# Patient Record
Sex: MALE | Race: WHITE | Employment: FULL TIME | URBAN - METROPOLITAN AREA
[De-identification: names, ages, dates, MRNs, and addresses within clinical notes are randomized per-mention and may not be internally consistent; named-entity substitution may affect disease eponyms.]

---

## 2019-11-08 ENCOUNTER — APPOINTMENT (OUTPATIENT)
Dept: GENERAL RADIOLOGY | Age: 49
End: 2019-11-08
Payer: COMMERCIAL

## 2019-11-08 ENCOUNTER — HOSPITAL ENCOUNTER (EMERGENCY)
Age: 49
Discharge: HOME | End: 2019-11-08
Attending: EMERGENCY MEDICINE
Payer: COMMERCIAL

## 2019-11-08 VITALS
TEMPERATURE: 97.2 F | SYSTOLIC BLOOD PRESSURE: 153 MMHG | DIASTOLIC BLOOD PRESSURE: 96 MMHG | HEART RATE: 79 BPM | WEIGHT: 180 LBS | RESPIRATION RATE: 18 BRPM | OXYGEN SATURATION: 100 %

## 2019-11-08 DIAGNOSIS — S61.019A: Primary | ICD-10-CM

## 2019-11-08 PROCEDURE — 96374 THER/PROPH/DIAG INJ IV PUSH: CPT

## 2019-11-08 PROCEDURE — 96372 THER/PROPH/DIAG INJ SC/IM: CPT

## 2019-11-08 PROCEDURE — 6360000002 HC RX W HCPCS

## 2019-11-08 PROCEDURE — 73130 X-RAY EXAM OF HAND: CPT

## 2019-11-08 PROCEDURE — 99283 EMERGENCY DEPT VISIT LOW MDM: CPT

## 2019-11-08 PROCEDURE — 12001 RPR S/N/AX/GEN/TRNK 2.5CM/<: CPT

## 2019-11-08 PROCEDURE — 2500000003 HC RX 250 WO HCPCS: Performed by: EMERGENCY MEDICINE

## 2019-11-08 PROCEDURE — 6370000000 HC RX 637 (ALT 250 FOR IP): Performed by: EMERGENCY MEDICINE

## 2019-11-08 RX ORDER — DOCUSATE SODIUM 100 MG/1
100 CAPSULE, LIQUID FILLED ORAL 2 TIMES DAILY
Qty: 6 CAPSULE | Refills: 0 | Status: SHIPPED | OUTPATIENT
Start: 2019-11-08 | End: 2019-11-11

## 2019-11-08 RX ORDER — LIDOCAINE HYDROCHLORIDE 10 MG/ML
20 INJECTION, SOLUTION INFILTRATION; PERINEURAL ONCE
Status: COMPLETED | OUTPATIENT
Start: 2019-11-08 | End: 2019-11-08

## 2019-11-08 RX ORDER — OXYCODONE HYDROCHLORIDE 5 MG/1
5 TABLET ORAL EVERY 6 HOURS PRN
Qty: 8 TABLET | Refills: 0 | Status: SHIPPED | OUTPATIENT
Start: 2019-11-08 | End: 2019-11-11

## 2019-11-08 RX ORDER — FENTANYL CITRATE 50 UG/ML
100 INJECTION, SOLUTION INTRAMUSCULAR; INTRAVENOUS ONCE
Status: COMPLETED | OUTPATIENT
Start: 2019-11-08 | End: 2019-11-08

## 2019-11-08 RX ORDER — CEPHALEXIN 500 MG/1
500 CAPSULE ORAL ONCE
Status: COMPLETED | OUTPATIENT
Start: 2019-11-08 | End: 2019-11-08

## 2019-11-08 RX ORDER — ACETAMINOPHEN 500 MG
1000 TABLET ORAL EVERY 8 HOURS PRN
Qty: 30 TABLET | Refills: 0 | Status: SHIPPED | OUTPATIENT
Start: 2019-11-08 | End: 2019-12-10 | Stop reason: ALTCHOICE

## 2019-11-08 RX ORDER — IBUPROFEN 800 MG/1
800 TABLET ORAL EVERY 8 HOURS PRN
Qty: 30 TABLET | Refills: 0 | Status: SHIPPED | OUTPATIENT
Start: 2019-11-08 | End: 2019-12-17 | Stop reason: ALTCHOICE

## 2019-11-08 RX ORDER — IBUPROFEN 800 MG/1
800 TABLET ORAL ONCE
Status: COMPLETED | OUTPATIENT
Start: 2019-11-08 | End: 2019-11-08

## 2019-11-08 RX ORDER — FENTANYL CITRATE 50 UG/ML
INJECTION, SOLUTION INTRAMUSCULAR; INTRAVENOUS
Status: COMPLETED
Start: 2019-11-08 | End: 2019-11-08

## 2019-11-08 RX ORDER — CEPHALEXIN 500 MG/1
500 CAPSULE ORAL 4 TIMES DAILY
Qty: 28 CAPSULE | Refills: 0 | Status: SHIPPED | OUTPATIENT
Start: 2019-11-08 | End: 2019-11-15

## 2019-11-08 RX ADMIN — LIDOCAINE HYDROCHLORIDE 20 ML: 10 INJECTION, SOLUTION INFILTRATION; PERINEURAL at 09:44

## 2019-11-08 RX ADMIN — FENTANYL CITRATE 100 MCG: 50 INJECTION, SOLUTION INTRAMUSCULAR; INTRAVENOUS at 09:44

## 2019-11-08 RX ADMIN — CEPHALEXIN 500 MG: 500 CAPSULE ORAL at 11:49

## 2019-11-08 RX ADMIN — IBUPROFEN 800 MG: 800 TABLET, FILM COATED ORAL at 11:49

## 2019-11-08 ASSESSMENT — PAIN SCALES - GENERAL
PAINLEVEL_OUTOF10: 4
PAINLEVEL_OUTOF10: 10
PAINLEVEL_OUTOF10: 10

## 2019-11-08 ASSESSMENT — PAIN DESCRIPTION - ORIENTATION: ORIENTATION: RIGHT

## 2019-11-08 ASSESSMENT — PAIN DESCRIPTION - PAIN TYPE: TYPE: ACUTE PAIN

## 2019-11-08 ASSESSMENT — PAIN DESCRIPTION - DESCRIPTORS: DESCRIPTORS: ACHING

## 2019-11-11 ENCOUNTER — TELEPHONE (OUTPATIENT)
Dept: BURN CARE | Age: 49
End: 2019-11-11

## 2019-11-12 ENCOUNTER — OFFICE VISIT (OUTPATIENT)
Dept: BURN CARE | Age: 49
End: 2019-11-12
Payer: COMMERCIAL

## 2019-11-12 VITALS
DIASTOLIC BLOOD PRESSURE: 70 MMHG | HEIGHT: 72 IN | HEART RATE: 72 BPM | SYSTOLIC BLOOD PRESSURE: 117 MMHG | BODY MASS INDEX: 25.06 KG/M2 | WEIGHT: 185 LBS

## 2019-11-12 DIAGNOSIS — S66.222A LACERATION OF EXTENSOR MUSCLE, FASCIA AND TENDON OF LEFT THUMB AT WRIST AND HAND LEVEL, INITIAL ENCOUNTER: Primary | ICD-10-CM

## 2019-11-12 PROCEDURE — 99203 OFFICE O/P NEW LOW 30 MIN: CPT | Performed by: PLASTIC SURGERY

## 2019-11-12 PROCEDURE — 99202 OFFICE O/P NEW SF 15 MIN: CPT

## 2019-11-12 SDOH — HEALTH STABILITY: MENTAL HEALTH: HOW OFTEN DO YOU HAVE A DRINK CONTAINING ALCOHOL?: NEVER

## 2019-11-13 ENCOUNTER — ANESTHESIA (OUTPATIENT)
Dept: OPERATING ROOM | Age: 49
End: 2019-11-13
Payer: COMMERCIAL

## 2019-11-13 ENCOUNTER — ANESTHESIA EVENT (OUTPATIENT)
Dept: OPERATING ROOM | Age: 49
End: 2019-11-13
Payer: COMMERCIAL

## 2019-11-13 ENCOUNTER — HOSPITAL ENCOUNTER (OUTPATIENT)
Age: 49
Setting detail: OUTPATIENT SURGERY
Discharge: HOME | End: 2019-11-13
Attending: PLASTIC SURGERY | Admitting: PLASTIC SURGERY
Payer: COMMERCIAL

## 2019-11-13 VITALS
WEIGHT: 185 LBS | DIASTOLIC BLOOD PRESSURE: 77 MMHG | OXYGEN SATURATION: 92 % | SYSTOLIC BLOOD PRESSURE: 126 MMHG | HEIGHT: 72 IN | HEART RATE: 68 BPM | TEMPERATURE: 97.4 F | BODY MASS INDEX: 25.06 KG/M2 | RESPIRATION RATE: 14 BRPM

## 2019-11-13 VITALS — DIASTOLIC BLOOD PRESSURE: 39 MMHG | SYSTOLIC BLOOD PRESSURE: 76 MMHG | TEMPERATURE: 95.7 F | OXYGEN SATURATION: 98 %

## 2019-11-13 DIAGNOSIS — S66.022A LACERATION OF LONG FLEXOR MUSCLE, FASCIA AND TENDON OF LEFT THUMB AT WRIST AND HAND LEVEL, INITIAL ENCOUNTER: Primary | ICD-10-CM

## 2019-11-13 DIAGNOSIS — S66.222A LACERATION OF EXTENSOR MUSCLE, FASCIA AND TENDON OF LEFT THUMB AT WRIST AND HAND LEVEL, INITIAL ENCOUNTER: ICD-10-CM

## 2019-11-13 PROCEDURE — 6360000002 HC RX W HCPCS: Performed by: NURSE ANESTHETIST, CERTIFIED REGISTERED

## 2019-11-13 PROCEDURE — 2500000003 HC RX 250 WO HCPCS: Performed by: NURSE ANESTHETIST, CERTIFIED REGISTERED

## 2019-11-13 PROCEDURE — 3600000013 HC SURGERY LEVEL 3 ADDTL 15MIN: Performed by: PLASTIC SURGERY

## 2019-11-13 PROCEDURE — 2580000003 HC RX 258: Performed by: NURSE ANESTHETIST, CERTIFIED REGISTERED

## 2019-11-13 PROCEDURE — 6360000002 HC RX W HCPCS: Performed by: PLASTIC SURGERY

## 2019-11-13 PROCEDURE — 7100000010 HC PHASE II RECOVERY - FIRST 15 MIN: Performed by: PLASTIC SURGERY

## 2019-11-13 PROCEDURE — 3700000001 HC ADD 15 MINUTES (ANESTHESIA): Performed by: PLASTIC SURGERY

## 2019-11-13 PROCEDURE — 7100000011 HC PHASE II RECOVERY - ADDTL 15 MIN: Performed by: PLASTIC SURGERY

## 2019-11-13 PROCEDURE — 2500000003 HC RX 250 WO HCPCS: Performed by: PLASTIC SURGERY

## 2019-11-13 PROCEDURE — 2709999900 HC NON-CHARGEABLE SUPPLY: Performed by: PLASTIC SURGERY

## 2019-11-13 PROCEDURE — 7100000001 HC PACU RECOVERY - ADDTL 15 MIN: Performed by: PLASTIC SURGERY

## 2019-11-13 PROCEDURE — 3600000003 HC SURGERY LEVEL 3 BASE: Performed by: PLASTIC SURGERY

## 2019-11-13 PROCEDURE — 6370000000 HC RX 637 (ALT 250 FOR IP): Performed by: PLASTIC SURGERY

## 2019-11-13 PROCEDURE — 7100000000 HC PACU RECOVERY - FIRST 15 MIN: Performed by: PLASTIC SURGERY

## 2019-11-13 PROCEDURE — 3700000000 HC ANESTHESIA ATTENDED CARE: Performed by: PLASTIC SURGERY

## 2019-11-13 RX ORDER — FENTANYL CITRATE 50 UG/ML
25 INJECTION, SOLUTION INTRAMUSCULAR; INTRAVENOUS EVERY 5 MIN PRN
Status: DISCONTINUED | OUTPATIENT
Start: 2019-11-13 | End: 2019-11-13 | Stop reason: HOSPADM

## 2019-11-13 RX ORDER — DEXAMETHASONE SODIUM PHOSPHATE 10 MG/ML
INJECTION INTRAMUSCULAR; INTRAVENOUS PRN
Status: DISCONTINUED | OUTPATIENT
Start: 2019-11-13 | End: 2019-11-13 | Stop reason: SDUPTHER

## 2019-11-13 RX ORDER — ONDANSETRON 2 MG/ML
INJECTION INTRAMUSCULAR; INTRAVENOUS PRN
Status: DISCONTINUED | OUTPATIENT
Start: 2019-11-13 | End: 2019-11-13 | Stop reason: SDUPTHER

## 2019-11-13 RX ORDER — PROPOFOL 10 MG/ML
INJECTION, EMULSION INTRAVENOUS PRN
Status: DISCONTINUED | OUTPATIENT
Start: 2019-11-13 | End: 2019-11-13 | Stop reason: SDUPTHER

## 2019-11-13 RX ORDER — MIDAZOLAM HYDROCHLORIDE 1 MG/ML
INJECTION INTRAMUSCULAR; INTRAVENOUS PRN
Status: DISCONTINUED | OUTPATIENT
Start: 2019-11-13 | End: 2019-11-13 | Stop reason: SDUPTHER

## 2019-11-13 RX ORDER — BUPIVACAINE HYDROCHLORIDE AND EPINEPHRINE 5; 5 MG/ML; UG/ML
INJECTION, SOLUTION EPIDURAL; INTRACAUDAL; PERINEURAL
Status: DISCONTINUED
Start: 2019-11-13 | End: 2019-11-13 | Stop reason: HOSPADM

## 2019-11-13 RX ORDER — BUPIVACAINE HYDROCHLORIDE AND EPINEPHRINE 5; 5 MG/ML; UG/ML
INJECTION, SOLUTION EPIDURAL; INTRACAUDAL; PERINEURAL PRN
Status: DISCONTINUED | OUTPATIENT
Start: 2019-11-13 | End: 2019-11-13 | Stop reason: ALTCHOICE

## 2019-11-13 RX ORDER — OXYCODONE HYDROCHLORIDE AND ACETAMINOPHEN 5; 325 MG/1; MG/1
1 TABLET ORAL EVERY 6 HOURS PRN
Qty: 28 TABLET | Refills: 0 | Status: SHIPPED | OUTPATIENT
Start: 2019-11-13 | End: 2019-11-20

## 2019-11-13 RX ORDER — GINSENG 100 MG
CAPSULE ORAL
Status: DISCONTINUED
Start: 2019-11-13 | End: 2019-11-13 | Stop reason: HOSPADM

## 2019-11-13 RX ORDER — FENTANYL CITRATE 50 UG/ML
50 INJECTION, SOLUTION INTRAMUSCULAR; INTRAVENOUS EVERY 5 MIN PRN
Status: DISCONTINUED | OUTPATIENT
Start: 2019-11-13 | End: 2019-11-13 | Stop reason: HOSPADM

## 2019-11-13 RX ORDER — FENTANYL CITRATE 50 UG/ML
INJECTION, SOLUTION INTRAMUSCULAR; INTRAVENOUS PRN
Status: DISCONTINUED | OUTPATIENT
Start: 2019-11-13 | End: 2019-11-13 | Stop reason: SDUPTHER

## 2019-11-13 RX ORDER — LIDOCAINE HYDROCHLORIDE 20 MG/ML
INJECTION, SOLUTION EPIDURAL; INFILTRATION; INTRACAUDAL; PERINEURAL PRN
Status: DISCONTINUED | OUTPATIENT
Start: 2019-11-13 | End: 2019-11-13 | Stop reason: SDUPTHER

## 2019-11-13 RX ORDER — ONDANSETRON 2 MG/ML
4 INJECTION INTRAMUSCULAR; INTRAVENOUS
Status: DISCONTINUED | OUTPATIENT
Start: 2019-11-13 | End: 2019-11-13 | Stop reason: HOSPADM

## 2019-11-13 RX ORDER — LAMOTRIGINE 100 MG/1
200 TABLET ORAL DAILY
COMMUNITY

## 2019-11-13 RX ORDER — SODIUM CHLORIDE, SODIUM LACTATE, POTASSIUM CHLORIDE, CALCIUM CHLORIDE 600; 310; 30; 20 MG/100ML; MG/100ML; MG/100ML; MG/100ML
INJECTION, SOLUTION INTRAVENOUS CONTINUOUS PRN
Status: DISCONTINUED | OUTPATIENT
Start: 2019-11-13 | End: 2019-11-13 | Stop reason: SDUPTHER

## 2019-11-13 RX ORDER — PHENYTOIN SODIUM 100 MG/1
100 CAPSULE, EXTENDED RELEASE ORAL 2 TIMES DAILY
COMMUNITY

## 2019-11-13 RX ORDER — GINSENG 100 MG
CAPSULE ORAL PRN
Status: DISCONTINUED | OUTPATIENT
Start: 2019-11-13 | End: 2019-11-13 | Stop reason: ALTCHOICE

## 2019-11-13 RX ORDER — METOCLOPRAMIDE HYDROCHLORIDE 5 MG/ML
10 INJECTION INTRAMUSCULAR; INTRAVENOUS
Status: DISCONTINUED | OUTPATIENT
Start: 2019-11-13 | End: 2019-11-13 | Stop reason: HOSPADM

## 2019-11-13 RX ORDER — HYDRALAZINE HYDROCHLORIDE 20 MG/ML
5 INJECTION INTRAMUSCULAR; INTRAVENOUS EVERY 10 MIN PRN
Status: DISCONTINUED | OUTPATIENT
Start: 2019-11-13 | End: 2019-11-13 | Stop reason: HOSPADM

## 2019-11-13 RX ADMIN — FENTANYL CITRATE 25 MCG: 50 INJECTION INTRAMUSCULAR; INTRAVENOUS at 10:55

## 2019-11-13 RX ADMIN — PROPOFOL 200 MG: 10 INJECTION, EMULSION INTRAVENOUS at 10:27

## 2019-11-13 RX ADMIN — FENTANYL CITRATE 25 MCG: 50 INJECTION INTRAMUSCULAR; INTRAVENOUS at 10:31

## 2019-11-13 RX ADMIN — FENTANYL CITRATE 50 MCG: 50 INJECTION INTRAMUSCULAR; INTRAVENOUS at 10:27

## 2019-11-13 RX ADMIN — FENTANYL CITRATE 50 MCG: 50 INJECTION INTRAMUSCULAR; INTRAVENOUS at 11:04

## 2019-11-13 RX ADMIN — DEXAMETHASONE SODIUM PHOSPHATE 8 MG: 10 INJECTION INTRAMUSCULAR; INTRAVENOUS at 11:09

## 2019-11-13 RX ADMIN — ONDANSETRON 4 MG: 2 INJECTION INTRAMUSCULAR; INTRAVENOUS at 11:06

## 2019-11-13 RX ADMIN — SODIUM CHLORIDE, POTASSIUM CHLORIDE, SODIUM LACTATE AND CALCIUM CHLORIDE: 600; 310; 30; 20 INJECTION, SOLUTION INTRAVENOUS at 10:24

## 2019-11-13 RX ADMIN — FENTANYL CITRATE 25 MCG: 50 INJECTION INTRAMUSCULAR; INTRAVENOUS at 10:38

## 2019-11-13 RX ADMIN — Medication 2 G: at 10:33

## 2019-11-13 RX ADMIN — LIDOCAINE HYDROCHLORIDE 50 MG: 20 INJECTION, SOLUTION EPIDURAL; INFILTRATION; INTRACAUDAL; PERINEURAL at 10:27

## 2019-11-13 RX ADMIN — MIDAZOLAM HYDROCHLORIDE 2 MG: 1 INJECTION, SOLUTION INTRAMUSCULAR; INTRAVENOUS at 10:25

## 2019-11-13 RX ADMIN — FENTANYL CITRATE 25 MCG: 50 INJECTION INTRAMUSCULAR; INTRAVENOUS at 10:46

## 2019-11-13 ASSESSMENT — PULMONARY FUNCTION TESTS
PIF_VALUE: 12
PIF_VALUE: 11
PIF_VALUE: 12
PIF_VALUE: 11
PIF_VALUE: 10
PIF_VALUE: 12
PIF_VALUE: 11
PIF_VALUE: 4
PIF_VALUE: 12
PIF_VALUE: 10
PIF_VALUE: 11
PIF_VALUE: 11
PIF_VALUE: 2
PIF_VALUE: 11
PIF_VALUE: 12
PIF_VALUE: 11
PIF_VALUE: 10
PIF_VALUE: 11
PIF_VALUE: 11
PIF_VALUE: 12
PIF_VALUE: 11
PIF_VALUE: 11
PIF_VALUE: 12
PIF_VALUE: 10
PIF_VALUE: 10
PIF_VALUE: 11
PIF_VALUE: 2
PIF_VALUE: 11
PIF_VALUE: 12
PIF_VALUE: 11
PIF_VALUE: 11
PIF_VALUE: 12
PIF_VALUE: 11
PIF_VALUE: 13
PIF_VALUE: 3
PIF_VALUE: 11
PIF_VALUE: 2
PIF_VALUE: 12
PIF_VALUE: 12
PIF_VALUE: 1
PIF_VALUE: 12
PIF_VALUE: 1
PIF_VALUE: 11
PIF_VALUE: 19
PIF_VALUE: 11
PIF_VALUE: 1
PIF_VALUE: 10
PIF_VALUE: 11
PIF_VALUE: 11
PIF_VALUE: 2

## 2019-11-13 ASSESSMENT — PAIN - FUNCTIONAL ASSESSMENT
PAIN_FUNCTIONAL_ASSESSMENT: PREVENTS OR INTERFERES SOME ACTIVE ACTIVITIES AND ADLS
PAIN_FUNCTIONAL_ASSESSMENT: 0-10

## 2019-11-13 ASSESSMENT — PAIN SCALES - GENERAL: PAINLEVEL_OUTOF10: 0

## 2019-11-13 ASSESSMENT — PAIN DESCRIPTION - DESCRIPTORS: DESCRIPTORS: ACHING;DULL

## 2019-11-14 ENCOUNTER — TELEPHONE (OUTPATIENT)
Dept: BURN CARE | Age: 49
End: 2019-11-14

## 2019-11-14 NOTE — TELEPHONE ENCOUNTER
Pt wife Philly Delatorre called the nurse line requesting to speak with  nurse. Joan Alva stated her  had surgery yesterday with  and she needed to speak with the nurse as soon as possible.  Joan Alva phone number is [phone]

## 2019-11-15 ENCOUNTER — TELEPHONE (OUTPATIENT)
Dept: BURN CARE | Age: 49
End: 2019-11-15

## 2019-11-26 ENCOUNTER — OFFICE VISIT (OUTPATIENT)
Dept: BURN CARE | Age: 49
End: 2019-11-26
Payer: COMMERCIAL

## 2019-11-26 VITALS
HEART RATE: 78 BPM | HEIGHT: 72 IN | BODY MASS INDEX: 25.33 KG/M2 | DIASTOLIC BLOOD PRESSURE: 71 MMHG | WEIGHT: 187 LBS | SYSTOLIC BLOOD PRESSURE: 107 MMHG

## 2019-11-26 DIAGNOSIS — S66.222A LACERATION OF EXTENSOR MUSCLE, FASCIA AND TENDON OF LEFT THUMB AT WRIST AND HAND LEVEL, INITIAL ENCOUNTER: ICD-10-CM

## 2019-11-26 PROCEDURE — 99212 OFFICE O/P EST SF 10 MIN: CPT | Performed by: PLASTIC SURGERY

## 2019-11-26 PROCEDURE — 99024 POSTOP FOLLOW-UP VISIT: CPT | Performed by: PLASTIC SURGERY

## 2019-12-10 ENCOUNTER — TELEPHONE (OUTPATIENT)
Dept: BURN CARE | Age: 49
End: 2019-12-10

## 2019-12-10 ENCOUNTER — OFFICE VISIT (OUTPATIENT)
Dept: BURN CARE | Age: 49
End: 2019-12-10
Payer: COMMERCIAL

## 2019-12-10 VITALS
HEIGHT: 72 IN | BODY MASS INDEX: 25.32 KG/M2 | SYSTOLIC BLOOD PRESSURE: 111 MMHG | HEART RATE: 73 BPM | WEIGHT: 186.95 LBS | DIASTOLIC BLOOD PRESSURE: 72 MMHG

## 2019-12-10 DIAGNOSIS — S66.222A LACERATION OF EXTENSOR MUSCLE, FASCIA AND TENDON OF LEFT THUMB AT WRIST AND HAND LEVEL, INITIAL ENCOUNTER: ICD-10-CM

## 2019-12-10 PROCEDURE — 99024 POSTOP FOLLOW-UP VISIT: CPT | Performed by: PLASTIC SURGERY

## 2019-12-10 PROCEDURE — 99212 OFFICE O/P EST SF 10 MIN: CPT

## 2019-12-10 PROCEDURE — 99212 OFFICE O/P EST SF 10 MIN: CPT | Performed by: PLASTIC SURGERY

## 2019-12-17 ENCOUNTER — ANESTHESIA EVENT (OUTPATIENT)
Dept: OPERATING ROOM | Age: 49
End: 2019-12-17
Payer: COMMERCIAL

## 2019-12-17 ENCOUNTER — OFFICE VISIT (OUTPATIENT)
Dept: BURN CARE | Age: 49
End: 2019-12-17
Payer: COMMERCIAL

## 2019-12-17 VITALS
HEIGHT: 72 IN | SYSTOLIC BLOOD PRESSURE: 109 MMHG | BODY MASS INDEX: 25.33 KG/M2 | WEIGHT: 187 LBS | HEART RATE: 74 BPM | DIASTOLIC BLOOD PRESSURE: 72 MMHG

## 2019-12-17 DIAGNOSIS — S61.412D LACERATION OF LEFT HAND INVOLVING TENDON, SUBSEQUENT ENCOUNTER: Primary | ICD-10-CM

## 2019-12-17 DIAGNOSIS — S66.922D LACERATION OF LEFT HAND INVOLVING TENDON, SUBSEQUENT ENCOUNTER: Primary | ICD-10-CM

## 2019-12-17 PROCEDURE — 99212 OFFICE O/P EST SF 10 MIN: CPT

## 2019-12-17 PROCEDURE — 99024 POSTOP FOLLOW-UP VISIT: CPT | Performed by: PLASTIC SURGERY

## 2019-12-17 PROCEDURE — 99212 OFFICE O/P EST SF 10 MIN: CPT | Performed by: PLASTIC SURGERY

## 2019-12-20 ENCOUNTER — HOSPITAL ENCOUNTER (OUTPATIENT)
Age: 49
Setting detail: OUTPATIENT SURGERY
Discharge: HOME | End: 2019-12-20
Attending: PLASTIC SURGERY | Admitting: PLASTIC SURGERY
Payer: COMMERCIAL

## 2019-12-20 ENCOUNTER — ANESTHESIA (OUTPATIENT)
Dept: OPERATING ROOM | Age: 49
End: 2019-12-20
Payer: COMMERCIAL

## 2019-12-20 VITALS
TEMPERATURE: 98.3 F | BODY MASS INDEX: 25.35 KG/M2 | RESPIRATION RATE: 13 BRPM | HEART RATE: 75 BPM | OXYGEN SATURATION: 87 % | WEIGHT: 187.2 LBS | SYSTOLIC BLOOD PRESSURE: 134 MMHG | HEIGHT: 72 IN | DIASTOLIC BLOOD PRESSURE: 79 MMHG

## 2019-12-20 VITALS — SYSTOLIC BLOOD PRESSURE: 89 MMHG | DIASTOLIC BLOOD PRESSURE: 53 MMHG | OXYGEN SATURATION: 100 %

## 2019-12-20 DIAGNOSIS — S66.222A LACERATION OF EXTENSOR MUSCLE, FASCIA AND TENDON OF LEFT THUMB AT WRIST AND HAND LEVEL, INITIAL ENCOUNTER: Primary | ICD-10-CM

## 2019-12-20 PROCEDURE — 2709999900 HC NON-CHARGEABLE SUPPLY: Performed by: PLASTIC SURGERY

## 2019-12-20 PROCEDURE — 3600000012 HC SURGERY LEVEL 2 ADDTL 15MIN: Performed by: PLASTIC SURGERY

## 2019-12-20 PROCEDURE — 2500000003 HC RX 250 WO HCPCS: Performed by: NURSE ANESTHETIST, CERTIFIED REGISTERED

## 2019-12-20 PROCEDURE — 3600000002 HC SURGERY LEVEL 2 BASE: Performed by: PLASTIC SURGERY

## 2019-12-20 PROCEDURE — 3700000001 HC ADD 15 MINUTES (ANESTHESIA): Performed by: PLASTIC SURGERY

## 2019-12-20 PROCEDURE — 7100000010 HC PHASE II RECOVERY - FIRST 15 MIN: Performed by: PLASTIC SURGERY

## 2019-12-20 PROCEDURE — 7100000000 HC PACU RECOVERY - FIRST 15 MIN: Performed by: PLASTIC SURGERY

## 2019-12-20 PROCEDURE — 6360000002 HC RX W HCPCS: Performed by: NURSE ANESTHETIST, CERTIFIED REGISTERED

## 2019-12-20 PROCEDURE — 6360000002 HC RX W HCPCS: Performed by: PLASTIC SURGERY

## 2019-12-20 PROCEDURE — 7100000001 HC PACU RECOVERY - ADDTL 15 MIN: Performed by: PLASTIC SURGERY

## 2019-12-20 PROCEDURE — 6360000002 HC RX W HCPCS: Performed by: ANESTHESIOLOGY

## 2019-12-20 PROCEDURE — 2500000003 HC RX 250 WO HCPCS: Performed by: PLASTIC SURGERY

## 2019-12-20 PROCEDURE — 3700000000 HC ANESTHESIA ATTENDED CARE: Performed by: PLASTIC SURGERY

## 2019-12-20 PROCEDURE — 2580000003 HC RX 258: Performed by: PLASTIC SURGERY

## 2019-12-20 PROCEDURE — 7100000011 HC PHASE II RECOVERY - ADDTL 15 MIN: Performed by: PLASTIC SURGERY

## 2019-12-20 PROCEDURE — 2580000003 HC RX 258: Performed by: ANESTHESIOLOGY

## 2019-12-20 RX ORDER — MAGNESIUM HYDROXIDE 1200 MG/15ML
LIQUID ORAL CONTINUOUS PRN
Status: COMPLETED | OUTPATIENT
Start: 2019-12-20 | End: 2019-12-20

## 2019-12-20 RX ORDER — ONDANSETRON 2 MG/ML
INJECTION INTRAMUSCULAR; INTRAVENOUS PRN
Status: DISCONTINUED | OUTPATIENT
Start: 2019-12-20 | End: 2019-12-20 | Stop reason: SDUPTHER

## 2019-12-20 RX ORDER — SODIUM CHLORIDE, SODIUM LACTATE, POTASSIUM CHLORIDE, CALCIUM CHLORIDE 600; 310; 30; 20 MG/100ML; MG/100ML; MG/100ML; MG/100ML
INJECTION, SOLUTION INTRAVENOUS CONTINUOUS
Status: DISCONTINUED | OUTPATIENT
Start: 2019-12-20 | End: 2019-12-20 | Stop reason: HOSPADM

## 2019-12-20 RX ORDER — FENTANYL CITRATE 50 UG/ML
INJECTION, SOLUTION INTRAMUSCULAR; INTRAVENOUS PRN
Status: DISCONTINUED | OUTPATIENT
Start: 2019-12-20 | End: 2019-12-20 | Stop reason: SDUPTHER

## 2019-12-20 RX ORDER — FENTANYL CITRATE 50 UG/ML
25 INJECTION, SOLUTION INTRAMUSCULAR; INTRAVENOUS EVERY 5 MIN PRN
Status: DISCONTINUED | OUTPATIENT
Start: 2019-12-20 | End: 2019-12-20 | Stop reason: HOSPADM

## 2019-12-20 RX ORDER — DEXAMETHASONE SODIUM PHOSPHATE 10 MG/ML
INJECTION, SOLUTION INTRAMUSCULAR; INTRAVENOUS PRN
Status: DISCONTINUED | OUTPATIENT
Start: 2019-12-20 | End: 2019-12-20 | Stop reason: SDUPTHER

## 2019-12-20 RX ORDER — MIDAZOLAM HYDROCHLORIDE 1 MG/ML
INJECTION INTRAMUSCULAR; INTRAVENOUS PRN
Status: DISCONTINUED | OUTPATIENT
Start: 2019-12-20 | End: 2019-12-20 | Stop reason: SDUPTHER

## 2019-12-20 RX ORDER — MIDAZOLAM HYDROCHLORIDE 1 MG/ML
2 INJECTION INTRAMUSCULAR; INTRAVENOUS
Status: DISCONTINUED | OUTPATIENT
Start: 2019-12-20 | End: 2019-12-20 | Stop reason: HOSPADM

## 2019-12-20 RX ORDER — CEPHALEXIN 500 MG/1
500 CAPSULE ORAL 3 TIMES DAILY
Qty: 15 CAPSULE | Refills: 0 | Status: SHIPPED | OUTPATIENT
Start: 2019-12-20 | End: 2019-12-25

## 2019-12-20 RX ORDER — METOCLOPRAMIDE HYDROCHLORIDE 5 MG/ML
10 INJECTION INTRAMUSCULAR; INTRAVENOUS
Status: DISCONTINUED | OUTPATIENT
Start: 2019-12-20 | End: 2019-12-20 | Stop reason: HOSPADM

## 2019-12-20 RX ORDER — SODIUM CHLORIDE 0.9 % (FLUSH) 0.9 %
10 SYRINGE (ML) INJECTION PRN
Status: DISCONTINUED | OUTPATIENT
Start: 2019-12-20 | End: 2019-12-20 | Stop reason: HOSPADM

## 2019-12-20 RX ORDER — BUPIVACAINE HYDROCHLORIDE AND EPINEPHRINE 5; 5 MG/ML; UG/ML
INJECTION, SOLUTION EPIDURAL; INTRACAUDAL; PERINEURAL PRN
Status: DISCONTINUED | OUTPATIENT
Start: 2019-12-20 | End: 2019-12-20 | Stop reason: ALTCHOICE

## 2019-12-20 RX ORDER — HYDRALAZINE HYDROCHLORIDE 20 MG/ML
5 INJECTION INTRAMUSCULAR; INTRAVENOUS EVERY 10 MIN PRN
Status: DISCONTINUED | OUTPATIENT
Start: 2019-12-20 | End: 2019-12-20 | Stop reason: HOSPADM

## 2019-12-20 RX ORDER — OXYCODONE HYDROCHLORIDE AND ACETAMINOPHEN 5; 325 MG/1; MG/1
1 TABLET ORAL EVERY 6 HOURS PRN
Qty: 28 TABLET | Refills: 0 | Status: SHIPPED | OUTPATIENT
Start: 2019-12-20 | End: 2019-12-27

## 2019-12-20 RX ORDER — SODIUM CHLORIDE 9 MG/ML
INJECTION, SOLUTION INTRAVENOUS CONTINUOUS
Status: DISCONTINUED | OUTPATIENT
Start: 2019-12-20 | End: 2019-12-20 | Stop reason: HOSPADM

## 2019-12-20 RX ORDER — PROPOFOL 10 MG/ML
INJECTION, EMULSION INTRAVENOUS PRN
Status: DISCONTINUED | OUTPATIENT
Start: 2019-12-20 | End: 2019-12-20 | Stop reason: SDUPTHER

## 2019-12-20 RX ORDER — ONDANSETRON 2 MG/ML
4 INJECTION INTRAMUSCULAR; INTRAVENOUS
Status: DISCONTINUED | OUTPATIENT
Start: 2019-12-20 | End: 2019-12-20 | Stop reason: HOSPADM

## 2019-12-20 RX ORDER — LIDOCAINE HYDROCHLORIDE 10 MG/ML
INJECTION, SOLUTION EPIDURAL; INFILTRATION; INTRACAUDAL; PERINEURAL PRN
Status: DISCONTINUED | OUTPATIENT
Start: 2019-12-20 | End: 2019-12-20 | Stop reason: SDUPTHER

## 2019-12-20 RX ORDER — SODIUM CHLORIDE 0.9 % (FLUSH) 0.9 %
10 SYRINGE (ML) INJECTION EVERY 12 HOURS SCHEDULED
Status: DISCONTINUED | OUTPATIENT
Start: 2019-12-20 | End: 2019-12-20 | Stop reason: HOSPADM

## 2019-12-20 RX ORDER — FENTANYL CITRATE 50 UG/ML
50 INJECTION, SOLUTION INTRAMUSCULAR; INTRAVENOUS EVERY 5 MIN PRN
Status: DISCONTINUED | OUTPATIENT
Start: 2019-12-20 | End: 2019-12-20 | Stop reason: HOSPADM

## 2019-12-20 RX ADMIN — MIDAZOLAM HYDROCHLORIDE 1 MG: 1 INJECTION, SOLUTION INTRAMUSCULAR; INTRAVENOUS at 11:53

## 2019-12-20 RX ADMIN — SODIUM CHLORIDE, POTASSIUM CHLORIDE, SODIUM LACTATE AND CALCIUM CHLORIDE: 600; 310; 30; 20 INJECTION, SOLUTION INTRAVENOUS at 11:53

## 2019-12-20 RX ADMIN — DEXAMETHASONE SODIUM PHOSPHATE 10 MG: 10 INJECTION, SOLUTION INTRAMUSCULAR; INTRAVENOUS at 12:07

## 2019-12-20 RX ADMIN — Medication 2 G: at 12:06

## 2019-12-20 RX ADMIN — FENTANYL CITRATE 50 MCG: 50 INJECTION INTRAMUSCULAR; INTRAVENOUS at 12:01

## 2019-12-20 RX ADMIN — FENTANYL CITRATE 25 MCG: 50 INJECTION INTRAMUSCULAR; INTRAVENOUS at 12:31

## 2019-12-20 RX ADMIN — MIDAZOLAM HYDROCHLORIDE 1 MG: 1 INJECTION, SOLUTION INTRAMUSCULAR; INTRAVENOUS at 11:55

## 2019-12-20 RX ADMIN — LIDOCAINE HYDROCHLORIDE 50 MG: 10 INJECTION, SOLUTION EPIDURAL; INFILTRATION; INTRACAUDAL; PERINEURAL at 12:01

## 2019-12-20 RX ADMIN — PROPOFOL 200 MG: 10 INJECTION, EMULSION INTRAVENOUS at 12:01

## 2019-12-20 RX ADMIN — HYDROMORPHONE HYDROCHLORIDE 0.25 MG: 1 INJECTION, SOLUTION INTRAMUSCULAR; INTRAVENOUS; SUBCUTANEOUS at 13:38

## 2019-12-20 RX ADMIN — FENTANYL CITRATE 25 MCG: 50 INJECTION INTRAMUSCULAR; INTRAVENOUS at 12:33

## 2019-12-20 RX ADMIN — ONDANSETRON 4 MG: 2 INJECTION INTRAMUSCULAR; INTRAVENOUS at 12:33

## 2019-12-20 ASSESSMENT — PULMONARY FUNCTION TESTS
PIF_VALUE: 3
PIF_VALUE: 14
PIF_VALUE: 1
PIF_VALUE: 14
PIF_VALUE: 4
PIF_VALUE: 3
PIF_VALUE: 8
PIF_VALUE: 1
PIF_VALUE: 15
PIF_VALUE: 14
PIF_VALUE: 14
PIF_VALUE: 7
PIF_VALUE: 14
PIF_VALUE: 14
PIF_VALUE: 3
PIF_VALUE: 15
PIF_VALUE: 14
PIF_VALUE: 2
PIF_VALUE: 14
PIF_VALUE: 3
PIF_VALUE: 14
PIF_VALUE: 14
PIF_VALUE: 3
PIF_VALUE: 15
PIF_VALUE: 14
PIF_VALUE: 0
PIF_VALUE: 14
PIF_VALUE: 23
PIF_VALUE: 1
PIF_VALUE: 14
PIF_VALUE: 4
PIF_VALUE: 3
PIF_VALUE: 14
PIF_VALUE: 1
PIF_VALUE: 3

## 2019-12-20 ASSESSMENT — PAIN DESCRIPTION - LOCATION
LOCATION: HAND

## 2019-12-20 ASSESSMENT — PAIN DESCRIPTION - ORIENTATION
ORIENTATION: LEFT

## 2019-12-20 ASSESSMENT — PAIN SCALES - GENERAL
PAINLEVEL_OUTOF10: 1
PAINLEVEL_OUTOF10: 3
PAINLEVEL_OUTOF10: 0
PAINLEVEL_OUTOF10: 6

## 2019-12-20 ASSESSMENT — PAIN DESCRIPTION - PAIN TYPE
TYPE: SURGICAL PAIN

## 2019-12-20 ASSESSMENT — PAIN DESCRIPTION - DESCRIPTORS
DESCRIPTORS: ACHING
DESCRIPTORS: ACHING

## 2019-12-30 ENCOUNTER — TELEPHONE (OUTPATIENT)
Dept: BURN CARE | Age: 49
End: 2019-12-30

## 2020-01-27 ENCOUNTER — HOSPITAL ENCOUNTER (OUTPATIENT)
Dept: OCCUPATIONAL THERAPY | Facility: CLINIC | Age: 50
Setting detail: THERAPIES SERIES
Discharge: HOME | End: 2020-01-27
Payer: COMMERCIAL

## 2020-01-27 PROCEDURE — 97140 MANUAL THERAPY 1/> REGIONS: CPT

## 2020-01-27 PROCEDURE — 97165 OT EVAL LOW COMPLEX 30 MIN: CPT

## 2020-01-27 PROCEDURE — 97110 THERAPEUTIC EXERCISES: CPT

## 2020-01-27 NOTE — CONSULTS
[] [address] [facility] floor       [address], [place]         Phone: [phone]       Fax: [phone] [x] [address] [facility] at [address] , [address] [place] [address]  Phone: [phone]  Fax: [phone]       Occupational Therapy Hand & Upper Extremity  Initial Evaluation      Date: 2020      Patient: Kaushal Siddiqui  : 1970  MRN: [id number]    Physician: Harjit Higgins MD  Insurance: [facility]     Medical Diagnosis: Laceration of extensor tendon of left thumb X38.924O. Rehab Codes: Stiffness in hand M25.64,, edema localized R60.0, pain in left finger(s) M79.645, muscle wasting of hand M62.54, anesthesia of kin R20.0, hyposthesia of skin R20.1,  adherent scar L90.5. Onset Date: 19    Next Dr. Dmitry Farmery: 20  1:00  PM  [address] [facility] office  #Visitis/Total Visits:    3 times a week for 36 visits C-9 auth'd  1/15/20 to 20  Cancels/No Shows:  0/0    Past Medical History: Seizures, medication controlled. Medications:   Refer to Medical chart in [facility]    Allergies:    Refer to Medical chart in [facility]       Mechanism of Injury: Lifting a 55 gallon drum with steel shavings   Surgery Date:  11/15/19, 19    Precautions:  Extensor tendon protocol            Involved Extremity:        Left    Dominant Extremity: Right    Previous Level of Function: Independent  Critical Job/Daily Task Description: Self care, household tasks, driving, gripping/grasping, using a micrometer. Work Status: Off due to injury/Condition  Orthosis:    NA    Subjective:  Chief Complaint:  Lack of motion  Pain: Intensity:   6/10 Location: Left thumb     Pain Type: Intermittant    Pain Altered Tx: no  Action Taken:none      Objective:  Tests/Measurements: Upper Extremity Functional Index  Current Functional Level:  3/80 functionally impaired as measured with the Upper Extremity Functional Index Survey.   0-80 scale, with 80 = no Deficits  (The motion). 3. Increase strength (pounds): left  strength will be52 or more pounds, pinches will be 10 or more pounds. 4. Increase function: UE Functional Index Score to 25/80 or more points to promote increased functional abilities. 5. Scar will be soft and pliable with minimal tethering. 6. Decrease Edema: Variance of circumfirential measurements at P1 of thumbs will be 0.5 cm or less. 7. Independent with HEP in 3 sessions. Long Term Goals: (  20  Treatments)  1. Decrease Pain: Pain will be 1/10 or less with mild to moderately resistive activity. 2.   Increase AROM (degrees): Left thumb IP joint will have 20 degrees or more of active flexion. 3.   Increase strength (pounds): Left  strength will be 62 or more pounds, pinches will be 10 - 15 pounds. 4.   Increase function: UE Functional Index Score to 40/80 or more points to promote increased functional abilities. 5.   Decrease Edema: Variance of circumfirential measurements at P1 of thumbs will be 0.2 cm or less. Patient Goals: Return to work, hold a cup with the left hand, sweep with the left hand. Comments/Assessment:  Pt is 10 weeks post-op for second surgery 12/20/19 to repair avulsed left flexor pollicis longus tendon and remove old hematoma in this area. Pt has been immobilized in a thumb spica following surgery. He is now referred to occupational therapy services for aggressive scar management and ROM exercise. Strengthening planned when appropriate. Pt currently has some mild AROM deficit of the left wrist due to immobilization. Anticipate rapid resolution of these deficits. The left thumb carpal-metacarpal (CMC) and metacarpal-phalangeal (MCP) joints have near normal motion. The inter-phalangeal (IP) joint has no motion at this time. Scar tissue at the volar thumb and wrist are significantly thickened, firm, and immobile at this time.   An aggressive scar massage program, and blocked active range of motion (AROM) exercise program initiated. Treatment Potential: Good   Suggested Professional Referral: No  Domestic Concerns: No   Barriers to Goal Achievement: No    Home Program Initiated: Written, Verbal and Demo     Comprehension of Education: Yes  Plans, Goals, Risks, Benefits, Discussed with and Patient    Treatment Plan:  Frequency/Duration:     3  Times a week, for   36   Visits. Therapeutic Exercise 14616, Manual Therapy 57472, Ultrasound [id number], Written Home Program and Hot Pack/Cold Pack 97377         Treatment This Date:  [x] Eval   25     Min. 1  Unit   0122 - 0840     Treatment Charges: Mins Units Time In/Out   []  Modalities        [x]  Ther Exercise 11  5059 - 6379   [x]  Manual Therapy 14  4340 - 8656   []  Ther Activities      []        []        []        Total Treatment time 43 min            Modalities:     Exercises:   Flow Sheet:  Exercise Reps/Time Weight/Level Comments   Edema massage   Administered  Pt education provided: verbal, demo. Pt voiced/demo'd understanding. Scar massage   Administered  Pt education provided: written, verbal, demo. Pt voiced/demo'd understanding. AROM left wrist, thumb 10 reps  Completed  Pt education provided: written, verbal, demo. Pt voiced/demo'd understanding. Evaluation Complexity:  History (Personal factors, comorbidities) [x]  0 []  1-2 []  3+   Exam (limitations, restrictions) [x]  1-2 []  3 []  4+   Decision Making [x]  Low []  Moderate []  High   ?  [x]  Low Complexity []  Moderate Complexity []  High Complexity        Total Treatment Time:  43  Minutes     Time In/Time Out: 8305 - 2832       Electronically signed by BRIELLE Mcnair/L, CHT on 1/27/2020 at 8:02 AM

## 2020-01-29 ENCOUNTER — HOSPITAL ENCOUNTER (OUTPATIENT)
Dept: OCCUPATIONAL THERAPY | Facility: CLINIC | Age: 50
Setting detail: THERAPIES SERIES
Discharge: HOME | End: 2020-01-29
Payer: COMMERCIAL

## 2020-01-29 PROCEDURE — 97110 THERAPEUTIC EXERCISES: CPT

## 2020-01-29 PROCEDURE — 97140 MANUAL THERAPY 1/> REGIONS: CPT

## 2020-01-30 ENCOUNTER — HOSPITAL ENCOUNTER (OUTPATIENT)
Dept: OCCUPATIONAL THERAPY | Facility: CLINIC | Age: 50
Setting detail: THERAPIES SERIES
Discharge: HOME | End: 2020-01-30
Payer: COMMERCIAL

## 2020-01-30 PROCEDURE — 97110 THERAPEUTIC EXERCISES: CPT

## 2020-01-30 PROCEDURE — 97140 MANUAL THERAPY 1/> REGIONS: CPT

## 2020-01-30 NOTE — FLOWSHEET NOTE
services for aggressive scar management and ROM exercise. Strengthening planned when appropriate. The flexor pollicis longus (FPL) is tensing with attempted blocked flexion at the inter-phalangeal (IP) joint. Scar tissue at the volar thumb and wrist are softening, still fairly firm, with improved mobility palpated. Aggressive scar massage program, edema massage, and blocked active range of motion (AROM) exercise program continued. Putty roll for scar management continued. Gel sleeve for edema/scar management issued for home use, to be worn all the time except exercises. Specific Instructions for Next Treatment:    Treatment Charges: Mins Units   []  Modalities     [x]  Ther Exercise 26 2   [x]  Manual Therapy 16 1   []  Ther Activities     []  Orthotic fitting/training     []  Orthotic re-check     []  Other         Assessment: [x] Progressing toward goals. [] No change. [] Other:    Short Term Goals: (  10    Treatments)  1. Decrease Pain: Pain will be 3/10 or less with non-resistive to mildly resistive activity. 2. Increase AROM (degrees): Left wrist extension/flexion will be +65/65 or more degrees (WNL), radial/ulnar wrist deviation will be 20/40 or more degrees (WNL), left forearm supination will be 80 or more degrees (WNL), left thumb MCP joint flexion will be 55 or more degrees (WNL), Left thumb IP joint will have a 10 degree arc of motion (currently 0 motion). 3. Increase strength (pounds): left  strength will be52 or more pounds, pinches will be 10 or more pounds. 4. Increase function: UE Functional Index Score to 25/80 or more points to promote increased functional abilities. 5. Scar will be soft and pliable with minimal tethering. 6. Decrease Edema: Variance of circumfirential measurements at P1 of thumbs will be 0.5 cm or less. 7. Independent with HEP in 3 sessions - MET.     Long Term Goals: (  20  Treatments)  1.    Decrease Pain: Pain will be 1/10 or less with mild to moderately resistive activity. 2.   Increase AROM (degrees): Left thumb IP joint will have 20 degrees or more of active flexion. 3.   Increase strength (pounds): Left  strength will be 62 or more pounds, pinches will be 10 - 15 pounds. 4.   Increase function: UE Functional Index Score to 40/80 or more points to promote increased functional abilities. 5.   Decrease Edema: Variance of circumfirential measurements at P1 of thumbs will be 0.2 cm or less.     Patient Goals: Return to work, hold a cup with the left hand, sweep with the left hand    Pt. Education:  [x] Yes  [] No  [] Reviewed Prior HEP/Ed  Method of Education: [x] Verbal  [x] Demo  [] Written  Re:  Comprehension of Education:  [x] Verbalizes understanding. [x] Demonstrates understanding. [] Needs review. [x] Demonstrates/verbalizes HEP/Ed previously given. Treatment Plan:  Frequency/Duration:     3  Times a week, for   36   Visits. Therapeutic Exercise 16312, Manual Therapy 72440, Ultrasound [id number], Written Home Program and Hot Pack/Cold Pack 38151       Time In/Time Out: 7067 - 6853  Total Treatment Time:  43  Min.       Electronically signed by:  BRIELLE Hidalgo/MARGARITA, CHT

## 2020-02-03 ENCOUNTER — HOSPITAL ENCOUNTER (OUTPATIENT)
Dept: OCCUPATIONAL THERAPY | Facility: CLINIC | Age: 50
Setting detail: THERAPIES SERIES
Discharge: HOME | End: 2020-02-03
Payer: COMMERCIAL

## 2020-02-03 PROCEDURE — 97110 THERAPEUTIC EXERCISES: CPT

## 2020-02-03 PROCEDURE — 97140 MANUAL THERAPY 1/> REGIONS: CPT

## 2020-02-03 NOTE — FLOWSHEET NOTE
[] [place]       Occupational Therapy             1st floor       [address] [place], [place]         Phone: [phone]       Fax: [phone] [x] [address] [facility] at            [address] , [address] [place] [address]     Phone: [phone]     Fax: [phone]      Occupational Therapy Daily Treatment Note    Date:  2/3/2020  Patient Name:  Irish Moore    :  1970  MRN: [id number]  Physician: Syed Bauman MD  Insurance: [id number] Diagnosis: Laceration of extensor tendon of left thumb X21.625Q. Rehab Codes: Stiffness in hand M25.64,, edema localized R60.0, pain in left finger(s) M79.645, muscle wasting of hand M62.54, anesthesia of kin R20.0, hyposthesia of skin R20.1,  adherent scar L90.5.      Onset Date: 19               Next Dr. Nicholas Sham: 20  1:00  PM  [address] [facility] office  #Visitis/Total Visits:    3 times a week for 36 visits C-9 auth'd  1/15/20 to 20  Cancels/No Shows:  0/0    Subjective:    Pain:  [x] Yes  [] No Location:Left thumb Pain Rating: (0-10 scale) 4/10  Pain altered Tx:  [x] No  [] Yes  Action: NA  Pt Comments: NA        Objective:  Modalities:     Exercises:  Flow Sheet:  Exercise Reps/Time Weight/Level Comments   Edema massage     Administered     Scar massage     Administered     AROM left wrist, thumb 20 reps   Completed  . Scar management - putty roll  5 minutes  Green  Completed   Active composite thumb extension with cotton balls  25 reps    Completed   Digi-sleeve for edema/scar management      Gel tube tore apart due to extensive wear and tear. Replaced with XX-Large Digi-Sleeve.   Pt education for wear and care/precautions reviewed (same as for gel sleeve)   Thumbcisor with #14 rubberband for isolated IP joint flexion with gentle strengthening 20 reps  Added  Pt education to use clicker pen at home            Comments/Assessment:  Pt is 11 weeks post-op for second surgery 19 to repair avulsed left flexor pollicis longus tendon and remove old hematoma in this area. Referred to occupational therapy services for aggressive scar management and ROM exercise. AROM has improved enough to begin gentle flexion strengthening of the thumb IP joint with thumbcisor. (clicker pen to be used for home program). The flexor pollicis longus (FPL) has visible flexion (minimal) with blocked flexion at the inter-phalangeal (IP) joint, no noticeable movement with composite thumb flexion. Scar tissue at the volar thumb and wrist are softening, still moderately firm, with improved mobility palpated. Scar topography is flattening out. Aggressive scar massage program, edema massage, and blocked active range of motion (AROM) exercise program continued. Putty roll for scar management continued. Gel sleeve for edema/scar management tore apart, replaced with a XX-Large size Digi-sleeve issued for home use, to be worn all the time except exercises. Pt re-educated for wear and care/precautions (same as gel sleeve)    Specific Instructions for Next Treatment:    Treatment Charges: Mins Units   []  Modalities     [x]  Ther Exercise 27 2   [x]  Manual Therapy 18 1   []  Ther Activities     []  Orthotic fitting/training     []  Orthotic re-check     []  Other         Assessment: [x] Progressing toward goals. [] No change. [] Other:    Short Term Goals: (  10    Treatments)  1. Decrease Pain: Pain will be 3/10 or less with non-resistive to mildly resistive activity. 2. Increase AROM (degrees): Left wrist extension/flexion will be +65/65 or more degrees (WNL), radial/ulnar wrist deviation will be 20/40 or more degrees (WNL), left forearm supination will be 80 or more degrees (WNL), left thumb MCP joint flexion will be 55 or more degrees (WNL), Left thumb IP joint will have a 10 degree arc of motion (currently 0 motion).   3. Increase strength (pounds): left  strength will be52 or more pounds, pinches will be 10 or more

## 2020-02-05 ENCOUNTER — HOSPITAL ENCOUNTER (OUTPATIENT)
Dept: OCCUPATIONAL THERAPY | Facility: CLINIC | Age: 50
Setting detail: THERAPIES SERIES
Discharge: HOME | End: 2020-02-05
Payer: COMMERCIAL

## 2020-02-05 PROCEDURE — 97110 THERAPEUTIC EXERCISES: CPT

## 2020-02-05 PROCEDURE — 97140 MANUAL THERAPY 1/> REGIONS: CPT

## 2020-02-05 NOTE — FLOWSHEET NOTE
services for aggressive scar management and ROM exercise. AROM has improved enough to begin gentle flexion strengthening of the thumb IP joint with thumbcisor. (clicker pen to be used for home program). The flexor pollicis longus (FPL) has visible flexion (minimal) with blocked flexion at the inter-phalangeal (IP) joint, no noticeable movement with composite thumb flexion. Scar tissue at the volar thumb and wrist are softening, moderately firm. Scar topography is flattening out. Aggressive scar massage program, edema massage, and blocked active range of motion (AROM) exercise program continued. Putty roll for scar management continued. Use of  XX-Large size Digi-sleeve continued for home use, to be worn all the time except exercises. Specific Instructions for Next Treatment:    Treatment Charges: Mins Units   []  Modalities     [x]  Ther Exercise 24 2   [x]  Manual Therapy 14 1   []  Ther Activities     []  Orthotic fitting/training     []  Orthotic re-check     []  Other         Assessment: [x] Progressing toward goals. [] No change. [] Other:    Short Term Goals: (  10    Treatments)  1. Decrease Pain: Pain will be 3/10 or less with non-resistive to mildly resistive activity. 2. Increase AROM (degrees): Left wrist extension/flexion will be +65/65 or more degrees (WNL), radial/ulnar wrist deviation will be 20/40 or more degrees (WNL), left forearm supination will be 80 or more degrees (WNL), left thumb MCP joint flexion will be 55 or more degrees (WNL), Left thumb IP joint will have a 10 degree arc of motion (currently 0 motion). 3. Increase strength (pounds): left  strength will be52 or more pounds, pinches will be 10 or more pounds. 4. Increase function: UE Functional Index Score to 25/80 or more points to promote increased functional abilities. 5. Scar will be soft and pliable with minimal tethering.   6. Decrease Edema: Variance of circumfirential measurements at P1 of thumbs will be 0.5

## 2020-02-06 ENCOUNTER — HOSPITAL ENCOUNTER (OUTPATIENT)
Dept: OCCUPATIONAL THERAPY | Facility: CLINIC | Age: 50
Setting detail: THERAPIES SERIES
Discharge: HOME | End: 2020-02-06
Payer: COMMERCIAL

## 2020-02-06 PROCEDURE — 97140 MANUAL THERAPY 1/> REGIONS: CPT

## 2020-02-06 PROCEDURE — 97110 THERAPEUTIC EXERCISES: CPT

## 2020-02-06 NOTE — FLOWSHEET NOTE
[] [place]       Occupational Therapy             1st floor       [address] [place], [place]         Phone: [phone]       Fax: [phone] [x] [address] at            [address] , [address] [place] [address]     Phone: [phone]     Fax: [phone]      Occupational Therapy Daily Treatment Note    Date:  2020  Patient Name:  Osbaldo Riding    :  1970  MRN: [id number]  Physician: Diamante Garcia MD  Insurance: [facility] [employer]     Medical Diagnosis: Laceration of extensor tendon of left thumb [id number]. Rehab Codes: Stiffness in hand M25.64,, edema localized R60.0, pain in left finger(s) M79.645, muscle wasting of hand M62.54, anesthesia of kin R20.0, hyposthesia of skin R20.1,  adherent scar L90.5.      Onset Date: 19               Next Dr. Andrews Best: 20  1:00  PM  [address] [facility] office  #Visitis/Total Visits:    3 times a week for 36 visits C-9 auth'd  1/15/20 to 20  Cancels/No Shows:  0/0    Subjective:    Pain:  [x] Yes  [] No Location:Left thumb Pain Rating: (0-10 scale) 4/10  Pain altered Tx:  [x] No  [] Yes  Action: NA  Pt Comments: NA    Objective:  Modalities:     Exercises:  Flow Sheet:  Exercise Reps/Time Weight/Level Comments   Edema massage     Administered     Scar massage  With mini-massager     Administered     AROM left wrist, thumb 20 reps   Completed  . Scar management - putty roll  5 minutes  Green  Completed   Active composite thumb extension with cotton balls  25 reps    Pt declined cotton balls,  Completed ROM without cotton balls   Digi-sleeve for edema/scar management       XX-Large Digi-Sleeve     Thumbcisor with #14 rubberband for isolated IP joint flexion with gentle strengthening 25 reps  Increased reps              Comments/Assessment:  Pt is 11.5 weeks post-op for second surgery 19 to repair avulsed left flexor pollicis longus tendon and remove old hematoma in this area.    Referred to occupational therapy services for aggressive scar management and ROM exercise. The flexor pollicis longus (FPL) has visible flexion (minimal) with blocked flexion at the inter-phalangeal (IP) joint, no noticeable movement with composite thumb flexion. Scar tissue at the volar thumb and wrist are softening, moderately firm. Scar topography is flattening out. Aggressive scar massage program, edema massage, and blocked active range of motion (AROM) exercise program continued. Putty roll for scar management continued. Thumbcisor exercise continued. (clicker pen used for home program). Use of  XX-Large size Digi-sleeve continued for home use, to be worn all the time except exercises. Specific Instructions for Next Treatment:      Treatment Charges: Mins Units Time In/Out   []?  Modalities         [x]?   Ther Exercise 20  1 [id number] - [id number]   [x]?   Manual Therapy 15  1 0859 - 2723   []?  Ther Activities         []?           []?           []?           Total Treatment time 35 min           Assessment: [x] Progressing toward goals. [] No change. [] Other:    Short Term Goals: (  10    Treatments)  1. Decrease Pain: Pain will be 3/10 or less with non-resistive to mildly resistive activity. 2. Increase AROM (degrees): Left wrist extension/flexion will be +65/65 or more degrees (WNL), radial/ulnar wrist deviation will be 20/40 or more degrees (WNL), left forearm supination will be 80 or more degrees (WNL), left thumb MCP joint flexion will be 55 or more degrees (WNL), Left thumb IP joint will have a 10 degree arc of motion (currently 0 motion). 3. Increase strength (pounds): left  strength will be52 or more pounds, pinches will be 10 or more pounds. 4. Increase function: UE Functional Index Score to 25/80 or more points to promote increased functional abilities. 5. Scar will be soft and pliable with minimal tethering.   6. Decrease Edema: Variance of circumfirential measurements at P1 of thumbs will be 0.5 cm or less. 7. Independent with HEP in 3 sessions - MET.     Long Term Goals: (  20  Treatments)  1. Decrease Pain: Pain will be 1/10 or less with mild to moderately resistive activity. 2.   Increase AROM (degrees): Left thumb IP joint will have 20 degrees or more of active flexion. 3.   Increase strength (pounds): Left  strength will be 62 or more pounds, pinches will be 10 - 15 pounds. 4.   Increase function: UE Functional Index Score to 40/80 or more points to promote increased functional abilities. 5.   Decrease Edema: Variance of circumfirential measurements at P1 of thumbs will be 0.2 cm or less.     Patient Goals: Return to work, hold a cup with the left hand, sweep with the left hand    Pt. Education:  [] Yes  [x] No  [] Reviewed Prior HEP/Ed  Method of Education: [] Verbal  [] Demo  [] Written  Re:  Comprehension of Education:  [] Verbalizes understanding. [] Demonstrates understanding. [] Needs review. [] Demonstrates/verbalizes HEP/Ed previously given. Treatment Plan:  Frequency/Duration:     3  Times a week, for   36   Visits to promote functional /grasp with left thumb. Time In/Time Out: 4679 - 9500  Total Treatment Time:  28   Min.       Electronically signed by:  BRIELLE Kent/L, ANGELA

## 2020-02-10 ENCOUNTER — HOSPITAL ENCOUNTER (OUTPATIENT)
Dept: OCCUPATIONAL THERAPY | Facility: CLINIC | Age: 50
Setting detail: THERAPIES SERIES
Discharge: HOME | End: 2020-02-10
Payer: COMMERCIAL

## 2020-02-10 PROCEDURE — 97140 MANUAL THERAPY 1/> REGIONS: CPT

## 2020-02-10 PROCEDURE — 97110 THERAPEUTIC EXERCISES: CPT

## 2020-02-10 NOTE — PROGRESS NOTES
[] [id number] [facility] floor       [address], [place]         Phone: [phone]       Fax: [phone] [x] 3232 [facility] at [address] , [address] [place] [address]  Phone: [phone]  Fax: [phone]       Occupational Therapy Hand & Upper Extremity  Progress Note      Date: 2/10/2020      Patient: Ana Laura Valerio  : 1970  MRN: [id number]    Physician: Ailin Arboleda MD  Insurance: [facility]     Medical Diagnosis: Laceration of extensor tendon of left thumb D58.235R. Rehab Codes: Stiffness in hand M25.64,, edema localized R60.0, pain in left finger(s) M79.645, muscle wasting of hand M62.54, anesthesia of kin R20.0, hyposthesia of skin R20.1,  adherent scar L90.5. Onset Date: 19    Next Dr. Frank Muro: 20  1:00  PM  [address] [facility] office  #Visitis/Total Visits:    3 times a week for 36 visits C-9 auth'd  1/15/20 to 20  Cancels/No Shows:  0/0    Past Medical History: Seizures, medication controlled. Medications:   Refer to Medical chart in [facility]    Allergies:    Refer to Medical chart in [facility]       Mechanism of Injury: Lifting a 55 gallon drum with steel shavings     Surgery Date:  11/15/19, 19    Precautions:  Extensor tendon protocol            Involved Extremity:        Left    Dominant Extremity: Right    Previous Level of Function: Independent  Critical Job/Daily Task Description: Self care, household tasks, driving, gripping/grasping, using a micrometer. Work Status: Off due to injury/Condition  Orthosis:    NA    Subjective:  Chief Complaint:  Lack of motion  Pain: Intensity:   4/10 Location: Left thumb     Pain Type: Intermittant    Pain Altered Tx: no  Action Taken:none  Pt Comments: \"The swelling is causing my thumb pain\".     Objective:  Tests/Measurements: Upper Extremity Functional Index  Current Functional Level:  27/80 functionally impaired as measured with the Upper Extremity Functional Index Survey. 0-80 scale, with 80 = no Deficits   Pt reports a significant improvement in functional abilities  Initial Functional Level:  3/80 functionally impaired as measured with the Upper Extremity Functional Index Survey. (The UEFI model does not provide any specific cut off points that could classify the upper limb disability degree, however, a minimal detectable change of 9 points is provided. This means that for improvement or deterioration to be considered, between two subsequent evaluations, the scores must differ by at least 9 points.)    STRENGTH   Current 02/10/200               Pounds RIGHT LEFT    81.6  incr 5 52.3  incr 10   Lateral pinch 21     incr 2 10     incr 5   2 point pinch 17     incr 4   7     incr 3   3 jaw pinch 18     incr 4   9     incr 4     The affected extremity is 36%% weaker than the unaffected extremity, an 8.8% improvement from the previous measurement. (affected score/unaffected score, take the total and subtract from 100    STRENGTH   Comparison, Initial 01/27/20               Pounds RIGHT LEFT    76.6 42.3   Lateral pinch 19   5   2 point pinch 13   3   3 jaw pinch 14   5     The affected extremity is 44.8% weaker than the unaffected extremity.   (affected score/unaffected score, take the total and subtract from 100)    AROM:  WRIST   Current 02/10/20                    Degrees LEFT AROM   Extension/Flexion +58/58  decr/decr   Radial/Ulnar Deviation   22/58  WNL/WNL   Forearm Pronation/Supination   85/68  WNL/incr     AROM:  WRIST   Comparison, Initial 01/27/20                    Degrees LEFT AROM   Extension/Flexion +60/63   Radial/Ulnar Deviation   18/32   Forearm Pronation/Supination   80/63     AROM:  THUMB   Current 02/10/20   Extension/Flexion     Degrees LEFT   CMC    75/55    incr/incr   MCP +10//55    incr/incr   IP   +7/+5    decr/incr     AROM:  THUMB   Comparison, Initial 01/27/2  Extension/Flexion     Degrees LEFT   CMC    60/40   MCP      0/50   IP +10/+10       Sensibility: Numbness and diminished sensation are less intense by pt report. Edema: Circumfirential thumb measurements:  P1:  Right 7.3 cm (unchanged),  Left 8.4 cm (decr 0.4 cm)   Variance of 1.1 cm (improved)  P2:  Right 6.7 cm (decr 0.2 cm), Left 6.9 cm (incr 0.1 cm) Variance of 0.2 cm (declined)  A variance of 0.5 cm or more is considered significant edema. Skin/Scar Color: Scar light pink in color, surrounding skin normal coloring. Skin/Scar Condition: Open blister distal to laceration/repair, scars are closed and dry. Problems: Pain, ROM, Strength, Function, Edema, Adherent Scar, Open Wound, Coordination and Sensation      Short Term Goals: (  10    Treatments)  1. Decrease Pain: Pain will be 3/10 or less with non-resistive to mildly resistive activity - Improved, Unmet (4/10). 2. Increase AROM (degrees): Left wrist extension/flexion will be +65/65 or more degrees (WNL) - Unmet, radial/ulnar wrist deviation will be 20/40 or more degrees (WNL) - MET, left forearm supination will be 80 or more degrees (WNL) - Unmet, left thumb MCP joint flexion will be 55 or more degrees (WNL) - MET (55 degrees), Left thumb IP joint will have a 10 degree arc of motion (currently 0 motion) - Unmet (2 degree arc of motion). 3. Increase strength (pounds): left  strength will be 52 or more pounds - MET (52.3 pounds), pinches will be 10 or more pounds - MET for lateral pinch. 4. Increase function: UE Functional Index Score to 25/80 or more points to promote increased functional abilities - MET (27/80). 5. Scar will be soft and pliable with minimal tethering - Improved, Unmet. 6. Decrease Edema: Variance of circumfirential measurements at P1 of thumbs will be 0.5 cm or less - Unmet (1.1 cm variance). 7. Independent with HEP in 3 sessions - MET. Long Term Goals: (  20  Treatments)  1. Decrease Pain: Pain will be 1/10 or less with mild to moderately resistive activity. Ongoing  2.    Increase Visits. Therapeutic Exercise 59091, Manual Therapy 77245, Ultrasound 47848, Written Home Program and Hot Pack/Cold Pack 66808         Treatment This Date:   Treatment Charges: Mins Units Time In/Out   []  Modalities        [x]  Ther Exercise 34  0800 - 3400   [x]  Manual Therapy 24  7952 - 8980   []  Ther Activities      []        []        []        Total Treatment time 58 min            Modalities:     Exercises:   Flow Sheet:  Exercise Reps/Time Weight/Level Comments   Edema massage     Administered      Scar massage  With mini-massager     Administered      AROM left wrist, thumb 20 reps   Completed  .    Scar management - putty roll  5 minutes  Green  Not Completed   due to measurements   Active composite thumb extension with cotton balls  25 reps    Pt declined cotton balls,  Completed ROM without cotton balls   Digi-sleeve for edema/scar management      Has  XX-Large Digi-Sleeve      Thumbcisor with #14 rubberband for isolated IP joint flexion with gentle strengthening 25 reps   Completed                   Total Treatment Time:  58  Minutes     Time In/Time Out: 0800 - 2028       Electronically signed by BRIELLE Rodrigues/L, CHT on 2/10/2020 at 8:06 AM

## 2020-02-12 ENCOUNTER — HOSPITAL ENCOUNTER (OUTPATIENT)
Dept: OCCUPATIONAL THERAPY | Facility: CLINIC | Age: 50
Setting detail: THERAPIES SERIES
Discharge: HOME | End: 2020-02-12
Payer: COMMERCIAL

## 2020-02-12 PROCEDURE — 97110 THERAPEUTIC EXERCISES: CPT

## 2020-02-12 PROCEDURE — 97140 MANUAL THERAPY 1/> REGIONS: CPT

## 2020-02-12 NOTE — FLOWSHEET NOTE
[] [place]       Occupational Therapy             1st floor       [address], [place]         Phone: [phone]       Fax: [phone] [x] [address] at            [address] , [address] [place] [address]     Phone: [phone]     Fax: [phone]      Occupational Therapy Daily Treatment Note    Date:  2020  Patient Name:  Emily Cornejo    :  1970  MRN: [id number]  Physician: Jarvis Lagos MD  Insurance: [facility] [employer]     Medical Diagnosis: Laceration of extensor tendon of left thumb [id number]. Rehab Codes: Stiffness in hand M25.64,, edema localized R60.0, pain in left finger(s) M79.645, muscle wasting of hand M62.54, anesthesia of kin R20.0, hyposthesia of skin R20.1,  adherent scar L90.5.      Onset Date: 19               Next Dr. Grimes Suresh: 20  1:00  PM  [address] office  #Visitis/Total Visits:    3 times a week for 36 visits C-9 auth'd  1/15/20 to 20  Cancels/No Shows:  0/0    Subjective:    Pain:  [x] Yes  [] No Location:Left thumb Pain Rating: (0-10 scale) 4/10  Pain altered Tx:  [x] No  [] Yes  Action: NA  Pt Comments: NA    Objective:  Modalities:     Exercises:  Flow Sheet:  Exercise Reps/Time Weight/Level Comments   Edema massage     Administered      Scar massage  With mini-massager     Administered      AROM left wrist, thumb 20 reps   Completed  . Scar management - putty roll  5 minutes  Green  Completed      Active composite thumb extension with cotton balls     Discontinued   Digi-sleeve for edema/scar management      Replaced  XX-Large Digi-Sleeve      Thumbcisor with red rubberband for isolated IP joint flexion with gentle strengthening 25 reps   Increased resistance                  Comments/Assessment:  Pt is 11.5 weeks post-op for second surgery 19 to repair avulsed left flexor pollicis longus tendon and remove old hematoma in this area.    Referred to occupational therapy

## 2020-02-13 ENCOUNTER — HOSPITAL ENCOUNTER (OUTPATIENT)
Dept: OCCUPATIONAL THERAPY | Facility: CLINIC | Age: 50
Setting detail: THERAPIES SERIES
Discharge: HOME | End: 2020-02-13
Payer: COMMERCIAL

## 2020-02-13 PROCEDURE — 97110 THERAPEUTIC EXERCISES: CPT

## 2020-02-13 PROCEDURE — 97035 APP MDLTY 1+ULTRASOUND EA 15: CPT

## 2020-02-13 PROCEDURE — 97140 MANUAL THERAPY 1/> REGIONS: CPT

## 2020-02-13 NOTE — FLOWSHEET NOTE
[] [place]       Occupational Therapy             1st floor       [address], [place]         Phone: [phone]       Fax: [phone] [x] [address] [facility] at            [address] , [address] [place] [address]     Phone: [phone]     Fax: [phone]      Occupational Therapy Daily Treatment Note    Date:  2020  Patient Name:  Macrina Wadsworth    :  1970  MRN: [id number]  Physician: Kellen Jacques MD  Insurance: [facility] [employer]     Medical Diagnosis: Laceration of extensor tendon of left thumb [id number]. Rehab Codes: Stiffness in hand M25.64,, edema localized R60.0, pain in left finger(s) M79.645, muscle wasting of hand M62.54, anesthesia of kin R20.0, hyposthesia of skin R20.1,  adherent scar L90.5.      Onset Date: 19               Next Dr. Jazmyne Ferreira: 20  1:00  PM  [address] [facility] office  #Visitis/Total Visits:    3 times a week for 36 visits C-9 auth'd  1/15/20 to 20  Cancels/No Shows:  0/0    Subjective:    Pain:  [x] Yes  [] No Location:Left thumb Pain Rating: (0-10 scale) 4/10  Pain altered Tx:  [x] No  [] Yes  Action: NA  Pt Comments: NA    Objective:  Modalities:  Modality Flow Sheet:  START STOP Tx Modality     Electrical Stim:     20  Ultrasound: 0.8 W/cm2 x 8 mins total  Duty factor: __100%  __50%  __33% __20%  Head size: 2 cm  MHz: __1mHz  __3mHz  Location: dorsal and volar left thumb, 4 minutes each     Hot Pack:     Cold Pack:      Exercises:  Flow Sheet:  Exercise Reps/Time Weight/Level Comments   Edema massage     Administered      Scar massage  With mini-massager     Administered      AROM left wrist, thumb 20 reps   Completed  .    Scar management - putty roll  5 minutes  Green  Completed      Active composite thumb extension with cotton balls     Discontinued   Digi-sleeve for edema/scar management      Hasd  XX-Large Digi-Sleeve      Thumbcisor with red rubberband for isolated IP joint flexion with gentle strengthening 25 reps   Completed    Resistive pinch with putty                              Added        Comments/Assessment:  Pt is 12 weeks post-op for second surgery 12/20/19 to repair avulsed left flexor pollicis longus tendon and remove old hematoma in this area. Referred to occupational therapy services for aggressive scar management and ROM exercise. The flexor pollicis longus (FPL) has visible flexion (minimal) with blocked flexion at the inter-phalangeal (IP) joint, no noticeable movement with composite thumb flexion. Scar tissue at the volar thumb and wrist are softening, mild to moderately firm (improved). Scar topography is flattening out, and scar color is fading to light pink. Ultrasound initiated this date to scars, with no reported benefit this date. Aggressive scar massage program, edema massage, and blocked active range of motion (AROM), putty roll, and thumbcisor exercise  exercise program continued. XX-Large size Digi-sleeve continued for home use, to be worn all the time except exercises. Not wearing on arrival, did not bring with hime for application following treatment. Specific Instructions for Next Treatment:      Treatment Charges: Mins Units Time In/Out   [x]?   Modalities: Ultrasound    8  1 [id number] - 9696   [x]?   Ther Exercise 12  1 [id number] - [id number]   [x]?   Manual Therapy 21  1 0800 - 0821   []?  Ther Activities         []?           []?           []?           Total Treatment time 41 min           Assessment: [x] Progressing toward goals. [] No change. [] Other:    Short Term Goals: (  10    Treatments)  1. Decrease Pain: Pain will be 3/10 or less with non-resistive to mildly resistive activity - Improved, Unmet (4/10).   2. Increase AROM (degrees): Left wrist extension/flexion will be +65/65 or more degrees (WNL) - Unmet, radial/ulnar wrist deviation will be 20/40 or more degrees (WNL) - MET, left forearm supination will be 80 or more degrees (WNL) - Unmet, left thumb MCP joint flexion will be 55 or more degrees (WNL) - MET (55 degrees), Left thumb IP joint will have a 10 degree arc of motion (currently 0 motion) - Unmet (2 degree arc of motion). 3. Increase strength (pounds): left  strength will be 52 or more pounds - MET (52.3 pounds), pinches will be 10 or more pounds - MET for lateral pinch. 4. Increase function: UE Functional Index Score to 25/80 or more points to promote increased functional abilities - MET (27/80). 5. Scar will be soft and pliable with minimal tethering - Improved, Unmet. 6. Decrease Edema: Variance of circumfirential measurements at P1 of thumbs will be 0.5 cm or less - Unmet (1.1 cm variance). 7. Independent with HEP in 3 sessions - MET.     Long Term Goals: (  20  Treatments)  1. Decrease Pain: Pain will be 1/10 or less with mild to moderately resistive activity. Ongoing  2. Increase AROM (degrees): Left thumb IP joint will have 20 degrees or more of active flexion. Ongoing  3. Increase strength (pounds): Left  strength will be 62 or more pounds, pinches will be 10 - 15 pounds. Ongoing  4. Increase function: UE Functional Index Score to 40/80 or more points to promote increased functional abilities. Ongoing  5. Decrease Edema: Variance of circumfirential measurements at P1 of thumbs will be 0.2 cm or less. Ongoing     Patient Goals: Return to work - Unmet, hold a cup with the left hand - Improved, Unmet, sweep with the left hand - Unmet. New goal (revised 02/10/20): open a door with the left hand.       Pt. Education:  [] Yes  [x] No  [] Reviewed Prior HEP/Ed  Method of Education: [] Verbal  [] Demo  [] Written  Re:  Comprehension of Education:  [] Verbalizes understanding. [] Demonstrates understanding. [] Needs review. [] Demonstrates/verbalizes HEP/Ed previously given. Treatment Plan:  Frequency/Duration:     3  Times a week, for   36   Visits to promote functional /grasp with left thumb.          Time In/Time Out: 0800 - 0841  Total Treatment Time:  39   Min.       Electronically signed by:  BRIELLE Sesay/MARGARITA, AVANIT

## 2020-02-17 ENCOUNTER — HOSPITAL ENCOUNTER (OUTPATIENT)
Dept: OCCUPATIONAL THERAPY | Facility: CLINIC | Age: 50
Setting detail: THERAPIES SERIES
Discharge: HOME | End: 2020-02-17
Payer: COMMERCIAL

## 2020-02-17 PROCEDURE — 97140 MANUAL THERAPY 1/> REGIONS: CPT

## 2020-02-17 PROCEDURE — 97110 THERAPEUTIC EXERCISES: CPT

## 2020-02-17 NOTE — FLOWSHEET NOTE
[] [place]       Occupational Therapy             1st floor       [address] [place], [place]         Phone: [phone]       Fax: [phone] [x] [address] [facility] at            [address] , [address] [place] [address]     Phone: [phone]     Fax: [phone]      Occupational Therapy Daily Treatment Note    Date:  2020  Patient Name:  Nina Stapleton    :  1970  MRN: [id number]  Physician: Vilma Payton MD  Insurance: [facility] [employer]     Medical Diagnosis: Laceration of extensor tendon of left thumb [id number]. Rehab Codes: Stiffness in hand M25.64,, edema localized R60.0, pain in left finger(s) M79.645, muscle wasting of hand M62.54, anesthesia of kin R20.0, hyposthesia of skin R20.1,  adherent scar L90.5.      Onset Date: 19               Next Dr. Diallo Pack: 20  1:00  PM  [address] [facility] office  #Visitis/Total Visits:    3 times a week for 36 visits C-9 auth'd  1/15/20 to 20  Cancels/No Shows:  0/0    Subjective:    Pain:  [x] Yes  [] No Location:Left thumb Pain Rating: (0-10 scale) 4/10  Pain altered Tx:  [x] No  [] Yes  Action: NA  Pt Comments: NA    Objective:  Modalities:  Modality Flow Sheet:  START STOP Tx Modality     Electrical Stim:     20  Ultrasound: 0.8 W/cm2 x 8 mins total  Duty factor: __100%  __50%  __33% __20%  Head size: 2 cm  MHz: __1mHz  __3mHz  Location: dorsal and volar left thumb, 4 minutes each     Hot Pack:     Cold Pack:      Exercises:  Flow Sheet:  Exercise Reps/Time Weight/Level Comments   Edema massage     Administered      Scar massage  With mini-massager     Administered      AROM left wrist, thumb 20 reps   Completed  .    Scar management - putty roll  5 minutes  Green  Completed      Active composite thumb extension with cotton balls     Discontinued   Digi-sleeve for edema/scar management      Has  XX-Large Digi-Sleeve      Thumbcisor with red rubberband for isolated IP joint flexion with gentle strengthening 25 reps   Completed    Resistive pinch with putty       10 reps         Tan                            Completed  Plan to increase resistance next session        Comments/Assessment:  Pt is 12 weeks post-op for second surgery 12/20/19 to repair avulsed left flexor pollicis longus tendon and remove old hematoma in this area. Referred to occupational therapy services for aggressive scar management and ROM exercise. The flexor pollicis longus (FPL) has visible flexion (minimal) with blocked flexion at the inter-phalangeal (IP) joint, inconsistent visual  movement with composite thumb flexion. Scar tissue at the volar thumb and wrist are softening, mild to moderately firm (improved). Scar topography is flattening out, and scar color is fading to light pink. Ultrasound continued to scars. Aggressive scar massage program, edema massage, and blocked active range of motion (AROM), putty roll, and thumbcisor exercise  exercise program continued. XX-Large size Digi-sleeve continued for home use, to be worn all the time except exercises. Not wearing on arrival, did not bring with him for application following treatment. Pt re-educated to benefit of sleeve use, patient voiced understanding. Specific Instructions for Next Treatment:      Treatment Charges: Mins Units Time In/Out   [x]?   Modalities: Ultrasound   8  0 [id number] - [id number]   [x]?   Ther Exercise 13  1 [id number] - [id number]   [x]?   Manual Therapy 25  2 [id number] - 9900   []?  Ther Activities         []?           []?           []?           Total Treatment time 46 min           Assessment: [x] Progressing toward goals. [] No change. [] Other:    Short Term Goals: (  10    Treatments)  1. Decrease Pain: Pain will be 3/10 or less with non-resistive to mildly resistive activity - Improved, Unmet (4/10).   2. Increase AROM (degrees): Left wrist extension/flexion will be +65/65 or more degrees (WNL) - Unmet, radial/ulnar wrist deviation will be 20/40 or more week, for   36   Visits to promote functional /grasp with left thumb for return to work. Time In/Time Out: 0804 - 0850  Total Treatment Time:  55   Min.       Electronically signed by:  BRIELLE Estevez/MARGARITA, ANGELA

## 2020-02-19 ENCOUNTER — HOSPITAL ENCOUNTER (OUTPATIENT)
Dept: OCCUPATIONAL THERAPY | Facility: CLINIC | Age: 50
Setting detail: THERAPIES SERIES
Discharge: HOME | End: 2020-02-19
Payer: COMMERCIAL

## 2020-02-19 PROCEDURE — 97035 APP MDLTY 1+ULTRASOUND EA 15: CPT

## 2020-02-19 PROCEDURE — 97140 MANUAL THERAPY 1/> REGIONS: CPT

## 2020-02-19 PROCEDURE — 97110 THERAPEUTIC EXERCISES: CPT

## 2020-02-19 NOTE — FLOWSHEET NOTE
[] [place]       Occupational Therapy             1st floor       [address] [place], [place]         Phone: [phone]       Fax: [phone] [x] [address] [facility] at            [address] , [address] [place] [address]     Phone: [phone]     Fax: [phone]      Occupational Therapy Daily Treatment Note    Date:  2020  Patient Name:  Colleen Alexander    :  1970  MRN: [id number]  Physician: Monisha Arenas MD  Insurance: [facility] [employer]     Medical Diagnosis: Laceration of extensor tendon of left thumb [id number]. Rehab Codes: Stiffness in hand M25.64,, edema localized R60.0, pain in left finger(s) M79.645, muscle wasting of hand M62.54, anesthesia of kin R20.0, hyposthesia of skin R20.1,  adherent scar L90.5.      Onset Date: 19               Next Dr. Kun Dewitt: 20  1:00  PM  [address] [facility] office  #Visitis/Total Visits:  10/36  3 times a week for 36 visits C-9 auth'd  1/15/20 to 20  Cancels/No Shows:  0/0    Subjective:    Pain:  [x] Yes  [] No Location:Left thumb Pain Rating: (0-10 scale) 4/10  Pain altered Tx:  [x] No  [] Yes  Action: NA  Pt Comments: NA    Objective:  Modalities:  Modality Flow Sheet:  START STOP Tx Modality     Electrical Stim:     20  Ultrasound: 0.8 W/cm2 x 8 mins total  Duty factor: __100%  __50%  __33% __20%  Head size: 2 cm  MHz: __1mHz  __3mHz  Location: dorsal and volar left thumb, 4 minutes each     Hot Pack:     Cold Pack:      Exercises:  Flow Sheet:  Exercise Reps/Time Weight/Level Comments   Edema massage     Administered      Scar massage  With mini-massager     Administered      AROM left wrist, thumb 20 reps   Completed  .    Scar management - putty roll  5 minutes  Green  Not Completed   Will do at home     Active composite thumb extension with cotton balls     Discontinued   Digi-sleeve for edema/scar management      Replaced  XX-Large Digi-Sleeve      Thumbcisor with red rubberband for isolated IP joint flexion with gentle strengthening 25 reps   Completed    Resistive pinch with putty       10 reps         Jasper               Increased resistance  Jasper putty issued for home program  Pt education provided: written, verbal, demo.        Comments/Assessment:  Pt is s/p for second surgery 12/20/19 to repair avulsed left flexor pollicis longus tendon and remove old hematoma in this area. Referred to occupational therapy services for aggressive scar management and ROM exercise. The flexor pollicis longus (FPL) has visible flexion  with blocked flexion at the inter-phalangeal (IP) joint (creasing of IP palmar crease observed), inconsistent visual  movement with composite thumb flexion observed. Scar tissue at the volar thumb and wrist are softening, mild to moderately firm (improved), thickest scar is on volar side of thumb. Scar topography is flattening out, and scar color is fading to light pink. A Digisleeve is being used to assist with scar molding. Digisleeve replace this date. Ultrasound continued to scars. Aggressive scar massage program, edema massage, and blocked active range of motion (AROM), putty roll//pinches, and thumbcisor exercise  exercise program continued. Specific Instructions for Next Treatment:      Treatment Charges: Mins Units Time In/Out   [x]?   Modalities: Ultrasound   8  1 2367 - 2855   [x]?   Ther Exercise 18  1 [id number] - [id number]   [x]?   Manual Therapy 21  1 6020 - 0982   []?  Ther Activities         []?           []?           []?           Total Treatment time 47 min           Assessment: [x] Progressing toward goals. [] No change. [] Other:    Short Term Goals: (  10    Treatments)  1. Decrease Pain: Pain will be 3/10 or less with non-resistive to mildly resistive activity - Improved, Unmet (4/10).   2. Increase AROM (degrees): Left wrist extension/flexion will be +65/65 or more degrees (WNL) - Unmet, radial/ulnar wrist deviation will be 20/40 or more degrees (WNL) - MET, left forearm supination will be 80 or more degrees (WNL) - Unmet, left thumb MCP joint flexion will be 55 or more degrees (WNL) - MET (55 degrees), Left thumb IP joint will have a 10 degree arc of motion (currently 0 motion) - Unmet (2 degree arc of motion). 3. Increase strength (pounds): left  strength will be 52 or more pounds - MET (52.3 pounds), pinches will be 10 or more pounds - MET for lateral pinch. 4. Increase function: UE Functional Index Score to 25/80 or more points to promote increased functional abilities - MET (27/80). 5. Scar will be soft and pliable with minimal tethering - Improved, Unmet. 6. Decrease Edema: Variance of circumfirential measurements at P1 of thumbs will be 0.5 cm or less - Unmet (1.1 cm variance). 7. Independent with HEP in 3 sessions - MET.     Long Term Goals: (  20  Treatments)  1. Decrease Pain: Pain will be 1/10 or less with mild to moderately resistive activity. Ongoing  2. Increase AROM (degrees): Left thumb IP joint will have 20 degrees or more of active flexion. Ongoing  3. Increase strength (pounds): Left  strength will be 62 or more pounds, pinches will be 10 - 15 pounds. Ongoing  4. Increase function: UE Functional Index Score to 40/80 or more points to promote increased functional abilities. Ongoing  5. Decrease Edema: Variance of circumfirential measurements at P1 of thumbs will be 0.2 cm or less. Ongoing     Patient Goals: Return to work - Unmet, hold a cup with the left hand - Improved, Unmet, sweep with the left hand - Unmet. New goal (revised 02/10/20): open a door with the left hand.       Pt. Education:  [x] Yes  [] No  [] Reviewed Prior HEP/Ed  Method of Education: [x] Verbal  [] Demo  [x] Written  Re:  Comprehension of Education:  [x] Verbalizes understanding. [x] Demonstrates understanding. [x] Needs review. [] Demonstrates/verbalizes HEP/Ed previously given.       Treatment Plan:  Frequency/Duration:     3  Times a week, for   36   Visits to promote functional /grasp with left thumb for return to work. Time In/Time Out: 0803 - 0850  Total Treatment Time: 52   Min.       Electronically signed by:  BRIELLE Kent/L, CHT

## 2020-02-20 ENCOUNTER — HOSPITAL ENCOUNTER (OUTPATIENT)
Dept: OCCUPATIONAL THERAPY | Facility: CLINIC | Age: 50
Setting detail: THERAPIES SERIES
Discharge: HOME | End: 2020-02-20
Payer: COMMERCIAL

## 2020-02-20 PROCEDURE — 97140 MANUAL THERAPY 1/> REGIONS: CPT

## 2020-02-20 PROCEDURE — 97110 THERAPEUTIC EXERCISES: CPT

## 2020-02-20 PROCEDURE — 97035 APP MDLTY 1+ULTRASOUND EA 15: CPT

## 2020-02-20 NOTE — FLOWSHEET NOTE
MCP joint flexion will be 55 or more degrees (WNL) - MET (55 degrees), Left thumb IP joint will have a 10 degree arc of motion (currently 0 motion) - Unmet (2 degree arc of motion). 3. Increase strength (pounds): left  strength will be 52 or more pounds - MET (52.3 pounds), pinches will be 10 or more pounds - MET for lateral pinch. 4. Increase function: UE Functional Index Score to 25/80 or more points to promote increased functional abilities - MET (27/80). 5. Scar will be soft and pliable with minimal tethering - Improved, Unmet. 6. Decrease Edema: Variance of circumfirential measurements at P1 of thumbs will be 0.5 cm or less - Unmet (1.1 cm variance). 7. Independent with HEP in 3 sessions - MET.     Long Term Goals: (  20  Treatments)  1. Decrease Pain: Pain will be 1/10 or less with mild to moderately resistive activity. Ongoing  2. Increase AROM (degrees): Left thumb IP joint will have 20 degrees or more of active flexion. Ongoing  3. Increase strength (pounds): Left  strength will be 62 or more pounds, pinches will be 10 - 15 pounds. Ongoing  4. Increase function: UE Functional Index Score to 40/80 or more points to promote increased functional abilities. Ongoing  5. Decrease Edema: Variance of circumfirential measurements at P1 of thumbs will be 0.2 cm or less. Ongoing     Patient Goals: Return to work - Unmet, hold a cup with the left hand - Improved, Unmet, sweep with the left hand - Unmet. New goal (revised 02/10/20): open a door with the left hand.       Pt. Education:  [] Yes  [x] No  [] Reviewed Prior HEP/Ed  Method of Education: [] Verbal  [] Demo  [] Written  Re:  Comprehension of Education:  [] Verbalizes understanding. [] Demonstrates understanding. [] Needs review. [] Demonstrates/verbalizes HEP/Ed previously given.       Treatment Plan:  Frequency/Duration:     3  Times a week, for   36   Visits to promote functional /grasp with left thumb for return to work.      Time In/Time Out: 9282 - 2650  Total Treatment Time: 48   Min.       Electronically signed by:  BRIELLE Rodrigues/MARGARITA, AVANIT

## 2020-02-24 ENCOUNTER — HOSPITAL ENCOUNTER (OUTPATIENT)
Dept: OCCUPATIONAL THERAPY | Facility: CLINIC | Age: 50
Setting detail: THERAPIES SERIES
Discharge: HOME | End: 2020-02-24
Payer: COMMERCIAL

## 2020-02-24 PROCEDURE — 97110 THERAPEUTIC EXERCISES: CPT

## 2020-02-24 PROCEDURE — 97140 MANUAL THERAPY 1/> REGIONS: CPT

## 2020-02-24 PROCEDURE — 97035 APP MDLTY 1+ULTRASOUND EA 15: CPT

## 2020-02-24 NOTE — FLOWSHEET NOTE
more degrees (WNL) - Unmet, radial/ulnar wrist deviation will be 20/40 or more degrees (WNL) - MET, left forearm supination will be 80 or more degrees (WNL) - Unmet, left thumb MCP joint flexion will be 55 or more degrees (WNL) - MET (55 degrees), Left thumb IP joint will have a 10 degree arc of motion (currently 0 motion) - Unmet (2 degree arc of motion). 3. Increase strength (pounds): left  strength will be 52 or more pounds - MET (52.3 pounds), pinches will be 10 or more pounds - MET for lateral pinch. 4. Increase function: UE Functional Index Score to 25/80 or more points to promote increased functional abilities - MET (27/80). 5. Scar will be soft and pliable with minimal tethering - Improved, Unmet. 6. Decrease Edema: Variance of circumfirential measurements at P1 of thumbs will be 0.5 cm or less - Unmet (1.1 cm variance). 7. Independent with HEP in 3 sessions - MET.     Long Term Goals: (  20  Treatments)  1. Decrease Pain: Pain will be 1/10 or less with mild to moderately resistive activity. Ongoing  2. Increase AROM (degrees): Left thumb IP joint will have 20 degrees or more of active flexion. Ongoing  3. Increase strength (pounds): Left  strength will be 62 or more pounds, pinches will be 10 - 15 pounds. Ongoing  4. Increase function: UE Functional Index Score to 40/80 or more points to promote increased functional abilities. Ongoing  5. Decrease Edema: Variance of circumfirential measurements at P1 of thumbs will be 0.2 cm or less. Ongoing     Patient Goals: Return to work - Unmet, hold a cup with the left hand - Improved, Unmet, sweep with the left hand - Unmet. New goal (revised 02/10/20): open a door with the left hand.       Pt. Education:  [] Yes  [x] No  [] Reviewed Prior HEP/Ed  Method of Education: [] Verbal  [] Demo  [] Written  Re:  Comprehension of Education:  [] Verbalizes understanding. [] Demonstrates understanding. [] Needs review.   [] Demonstrates/verbalizes HEP/Ed previously given. Treatment Plan:  Frequency/Duration:     3  Times a week, for   36   Visits to promote functional /grasp with left thumb for return to work. Time In/Time Out: 0805 - 0850  Total Treatment Time: 39   Min.       Electronically signed by:  AMENA Dodd, ANGELA

## 2020-02-26 ENCOUNTER — HOSPITAL ENCOUNTER (OUTPATIENT)
Dept: OCCUPATIONAL THERAPY | Facility: CLINIC | Age: 50
Setting detail: THERAPIES SERIES
Discharge: HOME | End: 2020-02-26
Payer: COMMERCIAL

## 2020-02-26 PROCEDURE — 97140 MANUAL THERAPY 1/> REGIONS: CPT

## 2020-02-26 PROCEDURE — 97110 THERAPEUTIC EXERCISES: CPT

## 2020-02-26 NOTE — PROGRESS NOTES
[] [address] [facility] floor       [address] [facility]         Phone: [phone]       Fax: [phone] [x] [address] [facility] at [facility] [address] [place] [address] , [address] [place] [address] Road  Phone: [phone]  Fax: [phone]       Occupational Therapy Hand & Upper Extremity  Progress Note      Date: 2020      Patient: Emily Cornejo  : 1970  MRN: [id number]    Physician: Jarvis Lagos MD  Insurance: [facility]     Medical Diagnosis: Laceration of extensor tendon of left thumb K89.720Q. Rehab Codes: Stiffness in hand M25.64,, edema localized R60.0, pain in left finger(s) M79.645, muscle wasting of hand M62.54, anesthesia of kin R20.0, hyposthesia of skin R20.1,  adherent scar L90.5. Onset Date: 19    Next Dr. Grimes Suresh: 20  1:00  PM  [address] [facility] office  #Visitis/Total Visits:  14/36  3 times a week for 36 visits C-9 auth'd  1/15/20 to 20  Cancels/No Shows:  0/0    Past Medical History: Seizures, medication controlled. Medications:   Refer to Medical chart in [facility]    Allergies:    Refer to Medical chart in [facility]       Mechanism of Injury: Lifting a 55 gallon drum with steel shavings     Surgery Date:  11/15/19, 19    Precautions: NA         Involved Extremity:        Left    Dominant Extremity: Right    Previous Level of Function: Independent  Critical Job/Daily Task Description: Self care, household tasks, driving, gripping/grasping, using a micrometer. Work Status: Off due to injury/Condition  Orthosis:    NA    Subjective:  Chief Complaint:  Lack of motion  Pain: Intensity:   4/10 Location: Left thumb  (unchanged)   Pain Type: Intermittant    Pain Altered Tx: no  Action Taken:none  Pt Comments:  NA    Objective:  Tests/Measurements: Upper Extremity Functional Index  Current Functional Level:  37/80 functionally impaired as measured with the Upper Extremity Functional Index Survey.   0-80 scale, with 80 = no Deficits   Pt reports a significant improvement in functional abilities  Previous Functional Level:  27/80 functionally impaired as measured with the Upper Extremity Functional Index Survey. Initial Functional Level:  3/80 functionally impaired as measured with the Upper Extremity Functional Index Survey. (The UEFI model does not provide any specific cut off points that could classify the upper limb disability degree, however, a minimal detectable change of 9 points is provided. This means that for improvement or deterioration to be considered, between two subsequent evaluations, the scores must differ by at least 9 points.)    STRENGTH   Current 02/26/20               Pounds RIGHT LEFT    81     decr 0.6 64     incr 11.7   Lateral pinch 22     incr 1 13     incr 3   2 point pinch 16     decr 1 10     incr 3   3 jaw pinch 18     same 12     incr 3     The affected extremity is 21% weaker than the unaffected extremity, an 15% improvement from the previous measurement. (affected score/unaffected score, take the total and subtract from 100    STRENGTH   Comparison, Previous  02/10/20               Pounds RIGHT LEFT    81.6  incr 5 52.3  incr 10   Lateral pinch 21     incr 2 10     incr 5   2 point pinch 17     incr 4   7     incr 3   3 jaw pinch 18     incr 4   9     incr 4     The affected extremity is 36%% weaker than the unaffected extremity, an 8.8% improvement from the previous measurement. (affected score/unaffected score, take the total and subtract from 100    STRENGTH   Comparison, Initial 01/27/20               Pounds RIGHT LEFT    76.6 42.3   Lateral pinch 19   5   2 point pinch 13   3   3 jaw pinch 14   5     The affected extremity is 44.8% weaker than the unaffected extremity.   (affected score/unaffected score, take the total and subtract from 100)      AROM:  WRIST   Current 02/26/20                    Degrees LEFT AROM   Extension/Flexion +70/88  WNL/WNL   Radial/Ulnar Deviation   22/58  WNL/WNL Forearm Pronation/Supination   80/90  WNL/WNL     AROM:  WRIST   Comparison, Previous   02/10/20                    Degrees LEFT AROM   Extension/Flexion +58/58  decr/decr   Radial/Ulnar Deviation   22/58  WNL/WNL   Forearm Pronation/Supination   85/68  WNL/incr     AROM:  WRIST   Comparison, Initial 01/27/20                    Degrees LEFT AROM   Extension/Flexion +60/63   Radial/Ulnar Deviation   18/32   Forearm Pronation/Supination   80/63       AROM:  THUMB   Current 02/10/20   Extension/Flexion     Degrees LEFT   CMC    70/49    WNL/decr   MCP   +2/68     WNL/WNL   IP +10/+5    WNL/same   Blocked IP flexion: 10 degrees    AROM:  THUMB   Comparison, Previous  02/10/20   Extension/Flexion     Degrees LEFT   CMC    75/55    incr/incr   MCP +10//55    incr/incr   IP   +7/+5    decr/incr     AROM:  THUMB   Comparison, Initial 01/27/2  Extension/Flexion     Degrees LEFT   CMC    60/40   MCP      0/50   IP +10/+10       Sensibility: Numbness and diminished sensation are less intense by pt report (improved). Edema: Circumfirential thumb measurements:  P1:  Right 7.4 cm (incr 0.1 cm), Left 8.1cm (decr 0.3 cm)   Variance of 0.7 cm (improved)  P2:  Right 7.   cm (incr 0.3 cm), Left 6.9 cm (unchanged)  Variance of 0.1 cm (improved)  A variance of 0.5 cm or more is considered significant edema. Skin/Scar Color: Scar light pink in color, surrounding skin normal coloring. Skin/Scar Condition: Closed and dry. Problems: Pain, ROM, Strength, Function, Edema, Adherent Scar, Open Wound, Coordination and Sensation      Short Term Goals: (  10    Treatments)  1. Decrease Pain: Pain will be 3/10 or less with non-resistive to mildly resistive activity - Unmet (4/10).   2. Increase AROM (degrees): Left wrist extension/flexion will be +65/65 or more degrees (WNL) - MET (+70/68), radial/ulnar wrist deviation will be 20/40 or more degrees (WNL) - MET (22/58), left forearm supination will be 80 or more degrees (WNL) - MET motion now WNL. The left thumb carpal-metacarpal (CMC) and metacarpal-phalangeal (MCP) joints  have normal motion. The left flexor pollicis longus (FPL) had no visible flexion. With blocked flexion at the inter-phalangeal (IP) joint a ten degree arc of motion is measured, but measurement is still in hyper-extension (+5 degrees). Scar: Scar tissue at the volar thumb and wrist are softening slowly, mild to moderately firm, thickest scar is on volar side of thumb. Scar topography is flattening out, and scar color has faded to light pink (unchanged). Digisleeve is being used to assist with scar molding. Worn all the time except exercises/bathing/water activities. Strength: the affected left  has improved 11.7 pounds (now at 64 pounds. The left  is 21% weaker than the unaffected right dominant . Ultrasound continued to scars as noted above. Aggressive scar massage program, edema massage, and blocked active range of motion (AROM), putty roll//pinches, and thumbcisor exercise program continued. Recommendations: Pt would benefit from skilled occupational therapy services for functional left hand /grasp to return to work. Treatment Plan:  Frequency/Duration:     3  Times a week, for   36   Visits.   Therapeutic Exercise 80514, Manual Therapy 82554, Ultrasound [id number], Written Home Program and Hot Pack/Cold Pack [id number]         Treatment This Date:   Treatment Charges: Mins Units Time In/Out   [x]  Modalities: Ultrasound  8 0 9499 - 6748   [x]  Ther Exercise 32 2 7033 - 9212   [x]  Manual Therapy 11 1 5550 - 1251   []  Ther Activities      []        []        []        Total Treatment time 51 min           Modalities:  Modality Flow Sheet:  START STOP Tx Modality       Electrical Stim:      02/13/20   Ultrasound: 0.8 W/cm2 x 8 mins total  Duty factor: __100%  __50%  __33% __20%  Head size: 2 cm  MHz: __1mHz  __3mHz  Location: dorsal and volar left thumb, 4 minutes each       Hot Pack:

## 2020-03-02 ENCOUNTER — HOSPITAL ENCOUNTER (OUTPATIENT)
Dept: OCCUPATIONAL THERAPY | Facility: CLINIC | Age: 50
Setting detail: THERAPIES SERIES
Discharge: HOME | End: 2020-03-02
Payer: COMMERCIAL

## 2020-03-02 PROCEDURE — 97110 THERAPEUTIC EXERCISES: CPT

## 2020-03-02 PROCEDURE — 97035 APP MDLTY 1+ULTRASOUND EA 15: CPT

## 2020-03-02 PROCEDURE — 97140 MANUAL THERAPY 1/> REGIONS: CPT

## 2020-03-02 NOTE — FLOWSHEET NOTE
[] [place]       Occupational Therapy             1st floor       [address] [place], [place]         Phone: [phone]       Fax: [phone] [x] 6135 [facility] at            [address] , [address] [place] [address]     Phone: [phone]     Fax: [phone]      Occupational Therapy Daily Treatment Note    Date:  3/2/2020  Patient Name:  Benny Russell    :  1970  MRN: [id number]  Physician: Feliciano Ball MD  Insurance: [facility] [employer]     Medical Diagnosis: Laceration of extensor tendon of left thumb [id number].             Rehab Codes: Stiffness in hand M25.64,, edema localized R60.0, pain in left finger(s) M79.645, muscle wasting of hand M62.54, anesthesia of kin R20.0, hyposthesia of skin R20.1,  adherent scar L90.5.      Onset Date: 19               Next Dr. Stefany Cao: 20  1:00  PM  [facility] office  #Visitis/Total Visits:  15/36  3 times a week for 36 visits C-9 auth'd  1/15/20 to 20  Cancels/No Shows:  0/0    Subjective:    Pain:  [x] Yes  [] No Location:Left thumb Pain Rating: (0-10 scale) 5/10  Pain altered Tx:  [x] No  [] Yes  Action: NA  Pt Comments: NA    Objective:  Modalities:  Modality Flow Sheet:  START STOP Tx Modality       Electrical Stim:      20   Ultrasound: 0.8 W/cm2 x 8 mins total  Duty factor: __100%  __50%  __33% __20%  Head size: 2 cm  MHz: __1mHz  __3mHz  Location: dorsal and volar left thumb, 4 minutes each       Hot Pack:       Cold Pack:      Exercises:  Flow Sheet:  Exercise Reps/Time Weight/Level Comments   Edema massage      Administered         Scar massage  With mini-massager 5 Minutes     Administered       AROM left wrist, thumb 20 reps   Completed     Scar management - putty roll  5 minutes  Green  Completed         Active composite thumb extension with cotton balls      Discontinued   Digi-sleeve for edema/scar management      Replaced  XX-Large Digi-Sleeve      Thumbcisor with red rubberband for isolated IP joint flexion with gentle strengthening 25 reps    Completed    Resistive pinch/extension with putty   10 reps each         [place]                         Completed                             Ultrasound - see parameters above                         Administered         Comments/Assessment:  Pt is s/p for second surgery 12/20/19 to repair avulsed left flexor pollicis longus tendon and remove old hematoma in this area. Referred to occupational therapy services for aggressive scar management and ROM exercise. Following aggressive scar massage and ultrasound pt demo'd visibile IP flexion (creasing of IP palmar crease observed), no  visual IP movement with composite thumb flexion observed. Pt is actively opposing thumb to PIP palmar crease of the little finger (improved)  Scar tissue at the volar thumb and wrist are softening slowly, mild to moderately firm, thickest scar is on volar side of thumb. Scar topography has flattened out, and scar color has faded to light pink (unchanged). Digisleeve replaced this date, and is being used to assist with scar molding. Worn all the time except exercises/bathing/water activities. Ultrasound continued to scars as noted above. Aggressive scar massage program, edema massage, and blocked active range of motion (AROM), putty roll//pinches, and thumbcisor exercise program continued. Specific Instructions for Next Treatment: Progress Note with measurements planned. Treatment Charges: Mins Units Time In/Out   [x]?   Modalities: Ultrasound   8  1 0834 - [id number]   [x]?   Ther Exercise 22  1 [id number] - [id number]   [x]?   Manual Therapy 20  1 0871 - 2512   []?  Ther Activities         []?           []?           []?           Total Treatment time 50 min           Assessment: [x] Progressing toward goals. [] No change. [] Other:    Short Term Goals: (  10    Treatments)  1.  Decrease Pain: Pain will be 3/10 or less with non-resistive to mildly resistive activity - Unmet (4/10). 2. Increase AROM (degrees): Left wrist extension/flexion will be +65/65 or more degrees (WNL) - MET (+70/68), radial/ulnar wrist deviation will be 20/40 or more degrees (WNL) - MET (22/58), left forearm supination will be 80 or more degrees (WNL) - MET (90), left thumb MCP joint flexion will be 55 or more degrees (WNL) - MET (68 degrees), Left thumb IP joint will have a 10 degree arc of motion (currently 0 motion) - MET (5 degree arc of motion). 3. Increase strength (pounds): left  strength will be 52 or more pounds - MET (64 pounds), pinches will be 10 or more pounds - MET for all. 4. Increase function: UE Functional Index Score to 25/80 or more points to promote increased functional abilities - MET (37/80). 5. Scar will be soft and pliable with minimal tethering - Improved, Unmet. 6. Decrease Edema: Variance of circumfirential measurements at P1 of thumbs will be 0.5 cm or less - Improved, Unmet (0.7 cm variance). 7. Independent with HEP in 3 sessions - MET.     Long Term Goals: (  20  Treatments)  1. Decrease Pain: Pain will be 1/10 or less with mild to moderately resistive activity. Ongoing  2. Increase AROM (degrees): Left thumb IP joint will have 20 degrees or more of active flexion. Ongoing  3. Increase strength (pounds): Left  strength will be 62 or more pounds - MET New  strength goal (revised 2/26/20: Left  strength will be 81 or more pounds, pinches will be 10 - 15 pounds - MET   New pinch goal (revised 2/26/20):  pinches will be 16-22 pounds. 4.   Increase function: UE Functional Index Score to 40/80 or more points to promote increased functional abilities. Ongoing  5. Decrease Edema: Variance of circumfirential measurements at P1 of thumbs will be 0.2 cm or less. Ongoing     Patient Goals: Return to work - Unmet, hold a cup with the left hand - Improved, Unmet, sweep with the left hand - Improved, Unmet.  New goal (revised 02/10/20): open a door with the left hand

## 2020-03-04 ENCOUNTER — HOSPITAL ENCOUNTER (OUTPATIENT)
Dept: OCCUPATIONAL THERAPY | Facility: CLINIC | Age: 50
Setting detail: THERAPIES SERIES
Discharge: HOME | End: 2020-03-04
Payer: COMMERCIAL

## 2020-03-04 PROCEDURE — 97140 MANUAL THERAPY 1/> REGIONS: CPT

## 2020-03-04 PROCEDURE — 97110 THERAPEUTIC EXERCISES: CPT

## 2020-03-04 PROCEDURE — 97035 APP MDLTY 1+ULTRASOUND EA 15: CPT

## 2020-03-04 NOTE — FLOWSHEET NOTE
[] [place]       Occupational Therapy             1st floor       [address] [place], [place]         Phone: [phone]       Fax: [phone] [x] 6135 [facility] at            [address] , [address] [place] [address]     Phone: [phone]     Fax: [phone]      Occupational Therapy Daily Treatment Note    Date:  3/4/2020  Patient Name:  Soledad Henderson    :  1970  MRN: [id number]  Physician: Danielle Rausch MD  Insurance: [facility] [employer]     Medical Diagnosis: Laceration of extensor tendon of left thumb [id number].             Rehab Codes: Stiffness in hand M25.64,, edema localized R60.0, pain in left finger(s) M79.645, muscle wasting of hand M62.54, anesthesia of kin R20.0, hyposthesia of skin R20.1,  adherent scar L90.5.      Onset Date: 19               Next Dr. Rosa Elena Richards: 20  1:00  PM  [address] office  #Visitis/Total Visits:    3 times a week for 36 visits C-9 auth'd  1/15/20 to 20  Cancels/No Shows:  0/0    Subjective:    Pain:  [x] Yes  [] No Location:Left thumb Pain Rating: (0-10 scale) 4/10  Pain altered Tx:  [x] No  [] Yes  Action: NA  Pt Comments: NA    Objective:  Modalities:  Modality Flow Sheet:  START STOP Tx Modality       Electrical Stim:      20   Ultrasound: 0.8 W/cm2 x 8 mins total  Duty factor: __100%  __50%  __33% __20%  Head size: 2 cm  MHz: __1mHz  __3mHz  Location: dorsal and volar left thumb, 4 minutes each       Hot Pack:       Cold Pack:      Exercises:  Flow Sheet:  Exercise Reps/Time Weight/Level Comments   Edema massage      Administered         Scar massage  With mini-massager 5 Minutes     Administered       AROM left wrist, thumb 20 reps   Completed     Scar management - putty roll  5 minutes  Green  Completed         Active composite thumb extension with cotton balls      Discontinued   Digi-sleeve for edema/scar management      Replaced  XX-Large Digi-Sleeve      Thumbcisor with red rubberband for isolated IP joint flexion with gentle strengthening 25 reps    Completed    Resistive pinch/extension with putty   10 reps each         [place]                        Completed                    Plan to increase           resistance for pinches only                       next session   Ultrasound - see parameters above                         Administered         Comments/Assessment:  Pt is s/p for second surgery 12/20/19 to repair avulsed left flexor pollicis longus tendon and remove old hematoma in this area. Referred to occupational therapy services for aggressive scar management and ROM exercise. Following aggressive scar massage and ultrasound pt demo'd visibile IP flexion (creasing of IP palmar crease observed), no  visual IP movement with composite thumb flexion observed. Pt is actively opposing thumb to PIP palmar crease of the little finger (unchanged). Scar tissue at the volar thumb and wrist are softening slowly, mild to moderately firm, thickest scar is on volar side of thumb. Scar topography has flattened out, and scar color has faded to light pink (unchanged). Digisleeve replaced this date - pt lost previous sleeve, and is being used to assist with scar molding. Worn all the time except exercises/bathing/water activities. Ultrasound continued to scars as noted above. Aggressive scar massage program, edema massage, and blocked active range of motion (AROM), putty roll//pinches, and thumbcisor exercise program continued. Specific Instructions for Next Treatment: Progress Note with measurements planned. Treatment Charges: Mins Units Time In/Out   [x]?   Modalities: Ultrasound   8  1 0815 - [id number]   [x]?   Ther Exercise 22  1 [id number] - [id number]   [x]?   Manual Therapy 14  1 0801 - 0815   []?  Ther Activities         []?           []?           []?           Total Treatment time 44 min           Assessment: [x] Progressing toward goals. [] No change.      [] Other:    Short Term Goals: (  10 Treatments)  1. Decrease Pain: Pain will be 3/10 or less with non-resistive to mildly resistive activity - Unmet (4/10). 2. Increase AROM (degrees): Left wrist extension/flexion will be +65/65 or more degrees (WNL) - MET (+70/68), radial/ulnar wrist deviation will be 20/40 or more degrees (WNL) - MET (22/58), left forearm supination will be 80 or more degrees (WNL) - MET (90), left thumb MCP joint flexion will be 55 or more degrees (WNL) - MET (68 degrees), Left thumb IP joint will have a 10 degree arc of motion (currently 0 motion) - MET (5 degree arc of motion). 3. Increase strength (pounds): left  strength will be 52 or more pounds - MET (64 pounds), pinches will be 10 or more pounds - MET for all. 4. Increase function: UE Functional Index Score to 25/80 or more points to promote increased functional abilities - MET (37/80). 5. Scar will be soft and pliable with minimal tethering - Improved, Unmet. 6. Decrease Edema: Variance of circumfirential measurements at P1 of thumbs will be 0.5 cm or less - Improved, Unmet (0.7 cm variance). 7. Independent with HEP in 3 sessions - MET.     Long Term Goals: (  20  Treatments)  1. Decrease Pain: Pain will be 1/10 or less with mild to moderately resistive activity. Ongoing  2. Increase AROM (degrees): Left thumb IP joint will have 20 degrees or more of active flexion. Ongoing  3. Increase strength (pounds): Left  strength will be 62 or more pounds - MET New  strength goal (revised 2/26/20: Left  strength will be 81 or more pounds, pinches will be 10 - 15 pounds - MET   New pinch goal (revised 2/26/20):  pinches will be 16-22 pounds. 4.   Increase function: UE Functional Index Score to 40/80 or more points to promote increased functional abilities. Ongoing  5. Decrease Edema: Variance of circumfirential measurements at P1 of thumbs will be 0.2 cm or less.  Ongoing     Patient Goals: Return to work - Unmet, hold a cup with the left hand - Improved, Unmet, sweep with the left hand - Improved, Unmet. New goal (revised 02/10/20): open a door with the left hand - Unmet.        Pt. Education:  [] Yes  [x] No  [] Reviewed Prior HEP/Ed  Method of Education: [] Verbal  [] Demo  [] Written  Re:  Comprehension of Education:  [] Verbalizes understanding. [] Demonstrates understanding. [] Needs review. [] Demonstrates/verbalizes HEP/Ed previously given. Treatment Plan:  Frequency/Duration:     3  Times a week, for   36   Visits to promote functional /grasp with left thumb for return to work. Time In/Time Out: 0807 - 0857  Total Treatment Time: 48  Min.       Electronically signed by:  BRIELLE García/MARGARITA, CHT

## 2020-03-05 ENCOUNTER — HOSPITAL ENCOUNTER (OUTPATIENT)
Dept: OCCUPATIONAL THERAPY | Facility: CLINIC | Age: 50
Setting detail: THERAPIES SERIES
Discharge: HOME | End: 2020-03-05
Payer: COMMERCIAL

## 2020-03-05 PROCEDURE — 97140 MANUAL THERAPY 1/> REGIONS: CPT

## 2020-03-05 PROCEDURE — 97110 THERAPEUTIC EXERCISES: CPT

## 2020-03-05 NOTE — FLOWSHEET NOTE
Thumbcisor with red rubberband for isolated IP joint flexion with gentle strengthening 25 reps    Completed    Resistive pinch/extension with putty   10 reps each   Brown for pinches     [place]ange for extension                Increased resistance  Brown putty issued for home program   Ultrasound - see parameters above                         Administered         Comments/Assessment:  Pt is s/p for second surgery 12/20/19 to repair avulsed left flexor pollicis longus tendon and remove old hematoma in this area. Referred to occupational therapy services for aggressive scar management and ROM exercise. Following aggressive scar massage and ultrasound pt demo'd visibile IP flexion (creasing of IP palmar crease observed), no  visual IP movement with composite thumb flexion observed. Pt is actively opposing thumb to PIP palmar crease of the little finger (unchanged). Scar tissue at the volar thumb and wrist are softening slowly, mild to moderately firm, thickest scar is on volar side of thumb. Scar topography has flattened out, and scar color has faded to light pink (unchanged). Digisleeve use continued to assist with scar molding. Worn all the time except exercises/bathing/water activities. Ultrasound continued to scars as noted above. Aggressive scar massage program, edema massage, and blocked active range of motion (AROM), PROM, putty roll//pinches (putty advanced for /pinches only), and thumbcisor exercise program continued. Specific Instructions for Next Treatment: Progress Note with measurements planned. Treatment Charges: Mins Units Time In/Out   [x]?   Modalities: Ultrasound   8  0 0818 - [id number]   [x]?   Ther Exercise 27  1 [id number] - [id number]   [x]?   Manual Therapy 12  1 0806 - 0818   []?  Ther Activities         []?           []?           []?           Total Treatment time 47 min           Assessment: [x] Progressing toward goals. [] No change.      [] Other:    Short Term Goals: (  10 Treatments)  1. Decrease Pain: Pain will be 3/10 or less with non-resistive to mildly resistive activity - Unmet (4/10). 2. Increase AROM (degrees): Left wrist extension/flexion will be +65/65 or more degrees (WNL) - MET (+70/68), radial/ulnar wrist deviation will be 20/40 or more degrees (WNL) - MET (22/58), left forearm supination will be 80 or more degrees (WNL) - MET (90), left thumb MCP joint flexion will be 55 or more degrees (WNL) - MET (68 degrees), Left thumb IP joint will have a 10 degree arc of motion (currently 0 motion) - MET (5 degree arc of motion). 3. Increase strength (pounds): left  strength will be 52 or more pounds - MET (64 pounds), pinches will be 10 or more pounds - MET for all. 4. Increase function: UE Functional Index Score to 25/80 or more points to promote increased functional abilities - MET (37/80). 5. Scar will be soft and pliable with minimal tethering - Improved, Unmet. 6. Decrease Edema: Variance of circumfirential measurements at P1 of thumbs will be 0.5 cm or less - Improved, Unmet (0.7 cm variance). 7. Independent with HEP in 3 sessions - MET.     Long Term Goals: (  20  Treatments)  1. Decrease Pain: Pain will be 1/10 or less with mild to moderately resistive activity. Ongoing  2. Increase AROM (degrees): Left thumb IP joint will have 20 degrees or more of active flexion. Ongoing  3. Increase strength (pounds): Left  strength will be 62 or more pounds - MET New  strength goal (revised 2/26/20: Left  strength will be 81 or more pounds, pinches will be 10 - 15 pounds - MET   New pinch goal (revised 2/26/20):  pinches will be 16-22 pounds. 4.   Increase function: UE Functional Index Score to 40/80 or more points to promote increased functional abilities. Ongoing  5. Decrease Edema: Variance of circumfirential measurements at P1 of thumbs will be 0.2 cm or less.  Ongoing     Patient Goals: Return to work - Unmet, hold a cup with the left hand -

## 2020-03-09 ENCOUNTER — HOSPITAL ENCOUNTER (OUTPATIENT)
Dept: OCCUPATIONAL THERAPY | Facility: CLINIC | Age: 50
Setting detail: THERAPIES SERIES
Discharge: HOME | End: 2020-03-09
Payer: COMMERCIAL

## 2020-03-09 PROCEDURE — 97035 APP MDLTY 1+ULTRASOUND EA 15: CPT

## 2020-03-09 PROCEDURE — 97110 THERAPEUTIC EXERCISES: CPT

## 2020-03-11 ENCOUNTER — HOSPITAL ENCOUNTER (OUTPATIENT)
Dept: OCCUPATIONAL THERAPY | Facility: CLINIC | Age: 50
Setting detail: THERAPIES SERIES
Discharge: HOME | End: 2020-03-11
Payer: COMMERCIAL

## 2020-03-11 PROCEDURE — 97110 THERAPEUTIC EXERCISES: CPT

## 2020-03-11 PROCEDURE — 97140 MANUAL THERAPY 1/> REGIONS: CPT

## 2020-03-11 NOTE — FLOWSHEET NOTE
[] [place]       Occupational Therapy             1st floor       [address] [place], [place]         Phone: [phone]       Fax: [phone] [x] 6135 [facility] at            [address] , [address] [place] [address]     Phone: [phone]     Fax: [phone]      Occupational Therapy Daily Treatment Note    Date:  3/11/2020  Patient Name:  Emily Cornejo    :  1970  MRN: [id number]  Physician: Jarvis Lagos MD  Insurance: [facility] [employer]     Medical Diagnosis: Laceration of extensor tendon of left thumb [id number].             Rehab Codes: Stiffness in hand M25.64,, edema localized R60.0, pain in left finger(s) M79.645, muscle wasting of hand M62.54, anesthesia of kin R20.0, hyposthesia of skin R20.1,  adherent scar L90.5.      Onset Date: 19               Next Dr. Grimes Suresh:    [address]  office  #Visitis/Total Visits:    3 times a week for 36 visits C-9 auth'd  1/15/20 to 20  Cancels/No Shows:  1/0    Subjective:    Pain:  [x] Yes  [] No Location:Left thumb Pain Rating: (0-10 scale) 4/10   Pain altered Tx:  [x] No  [] Yes  Action: NA  Pt Comments: NA    Objective:  Modalities:  Modality Flow Sheet:  START STOP Tx Modality       Electrical Stim:      20   Ultrasound: 0.8 W/cm2 x 8 mins total  Duty factor: __100%  __50%  __33% __20%  Head size: 2 cm  MHz: __1mHz  __3mHz  Location: dorsal and volar left thumb, 4 minutes each       Hot Pack:       Cold Pack:      Exercises:  Flow Sheet:  Exercise Reps/Time Weight/Level Comments   Edema massage     Not Administered         Scar massage  With Dycemr 5 Minutes     Administered    Added Dycem   PROM - left thumb    Administered   AROM left wrist, thumb 20 reps   Completed     Scar management - putty roll  5 minutes  Green   Completed         Active composite thumb extension with cotton balls      Discontinued   Digi-sleeve for edema/scar management      Re-issued 20:    Clarice Mukherjee

## 2020-03-12 ENCOUNTER — APPOINTMENT (OUTPATIENT)
Dept: OCCUPATIONAL THERAPY | Facility: CLINIC | Age: 50
End: 2020-03-12
Payer: COMMERCIAL

## 2020-03-16 ENCOUNTER — HOSPITAL ENCOUNTER (OUTPATIENT)
Dept: OCCUPATIONAL THERAPY | Facility: CLINIC | Age: 50
Setting detail: THERAPIES SERIES
Discharge: HOME | End: 2020-03-16
Payer: COMMERCIAL

## 2020-03-16 PROCEDURE — 97035 APP MDLTY 1+ULTRASOUND EA 15: CPT

## 2020-03-16 PROCEDURE — 97140 MANUAL THERAPY 1/> REGIONS: CPT

## 2020-03-16 PROCEDURE — 97110 THERAPEUTIC EXERCISES: CPT

## 2020-03-16 NOTE — FLOWSHEET NOTE
[] [place]       Occupational Therapy             1st floor       [address] [place], [place]         Phone: [phone]       Fax: [phone] [x] 6135 [facility] at            [address] , [address] [place] [address]     Phone: [phone]     Fax: [phone]      Occupational Therapy Daily Treatment Note    Date:  3/16/2020  Patient Name:  Macrina Wadsworth    :  1970  MRN: [id number]  Physician: Kellen Jacques MD  Insurance: [employer]     Medical Diagnosis: Laceration of extensor tendon of left thumb [id number].             Rehab Codes: Stiffness in hand M25.64,, edema localized R60.0, pain in left finger(s) M79.645, muscle wasting of hand M62.54, anesthesia of kin R20.0, hyposthesia of skin R20.1,  adherent scar L90.5.      Onset Date: 19               Next Dr. Jazmyne Ferreira:    [address] office  #Visitis/Total Visits:     1 time a week until end of current: C-9 auth'd  1/15/20 to 20  Cancels/No Shows:  1/0    Subjective:    Pain:  [x] Yes  [] No Location:Left thumb Pain Rating: (0-10 scale) 4/10   Pain altered Tx:  [x] No  [] Yes  Action: NA  Pt Comments: NA    Objective:  Modalities:  Modality Flow Sheet:  START STOP Tx Modality       Electrical Stim:      20   Ultrasound: 0.8 W/cm2 x 8 mins total  Duty factor: __100%  __50%  __33% __20%  Head size: 2 cm  MHz: __1mHz  __3mHz  Location: dorsal and volar left thumb, 4 minutes each       Hot Pack:       Cold Pack:      Exercises:  Flow Sheet:  Exercise Reps/Time Weight/Level Comments   Edema massage     Not Administered         Scar massage  With Dycemr 5 Minutes     Administered   Replaced Dycem 20   PROM - left thumb    Administered   AROM left  thumb 20 reps   Completed  Wrist discontinued   Scar management - putty roll  5 minutes  Green   Completed         Active composite thumb extension with cotton balls      Discontinued   Digi-sleeve for edema/scar management      New  strength goal (revised 2/26/20: Left  strength will be 81 or more pounds, pinches will be 10 - 15 pounds - MET   New pinch goal (revised 2/26/20):  pinches will be 16-22 pounds. 4.   Increase function: UE Functional Index Score to 40/80 or more points to promote increased functional abilities. Ongoing  5. Decrease Edema: Variance of circumfirential measurements at P1 of thumbs will be 0.2 cm or less. Ongoing     Patient Goals: Return to work - Unmet, hold a cup with the left hand - Improved, Unmet, sweep with the left hand - Improved, Unmet. New goal (revised 02/10/20): open a door with the left hand - Unmet.        Pt. Education:  [] Yes  [x] No  [] Reviewed Prior HEP/Ed  Method of Education: [] Verbal  [] Demo  [] Written  Re:  Comprehension of Education:  [] Verbalizes understanding. [] Demonstrates understanding. [] Needs review. [] Demonstrates/verbalizes HEP/Ed previously given. Treatment Plan: Promote functional /grasp with left thumb for return to work. Time In/Time Out: 0806 - 0850  Total Treatment Time: 47 Min.       Electronically signed by:  BIRELLE Gale/MARGARITA, ANGELA

## 2020-03-18 ENCOUNTER — APPOINTMENT (OUTPATIENT)
Dept: OCCUPATIONAL THERAPY | Facility: CLINIC | Age: 50
End: 2020-03-18
Payer: COMMERCIAL

## 2020-03-19 ENCOUNTER — APPOINTMENT (OUTPATIENT)
Dept: OCCUPATIONAL THERAPY | Facility: CLINIC | Age: 50
End: 2020-03-19
Payer: COMMERCIAL

## 2020-03-20 NOTE — FLOWSHEET NOTE
[x] [facility]) - [facility]  [facility]  [address]  [phone]  F: [phone] [] [address] [facility] [address]   [address]  P: [phone]  F: [phone] [] [address]  [address] [facility]  P: [phone]  F: [phone] [] [address]  [facility]   [address]   [place]: [phone]  F: [phone]           Emily Jointer   1970   [id number]    3/20/2020    Writer cancelled all appointments for patient due to closing of clinic due to COVID-19 as of 3/23/20. Left message. Also instructed patient that the clinic would call when we re-open to resume therapy.       Electronically signed by Sharon Burgess PT on 3/20/2020 at 11:06 AM

## 2020-03-23 ENCOUNTER — HOSPITAL ENCOUNTER (OUTPATIENT)
Dept: OCCUPATIONAL THERAPY | Facility: CLINIC | Age: 50
Setting detail: THERAPIES SERIES
Discharge: HOME | End: 2020-03-23
Payer: COMMERCIAL

## 2020-03-25 ENCOUNTER — APPOINTMENT (OUTPATIENT)
Dept: OCCUPATIONAL THERAPY | Facility: CLINIC | Age: 50
End: 2020-03-25
Payer: COMMERCIAL

## 2020-03-26 ENCOUNTER — APPOINTMENT (OUTPATIENT)
Dept: OCCUPATIONAL THERAPY | Facility: CLINIC | Age: 50
End: 2020-03-26
Payer: COMMERCIAL

## 2020-03-30 ENCOUNTER — APPOINTMENT (OUTPATIENT)
Dept: OCCUPATIONAL THERAPY | Facility: CLINIC | Age: 50
End: 2020-03-30
Payer: COMMERCIAL

## 2020-06-01 ENCOUNTER — HOSPITAL ENCOUNTER (OUTPATIENT)
Dept: OCCUPATIONAL THERAPY | Age: 50
Setting detail: THERAPIES SERIES
Discharge: HOME | End: 2020-06-01
Payer: COMMERCIAL

## 2020-06-01 PROCEDURE — 97140 MANUAL THERAPY 1/> REGIONS: CPT

## 2020-06-01 PROCEDURE — 97110 THERAPEUTIC EXERCISES: CPT

## 2020-06-01 PROCEDURE — 97035 APP MDLTY 1+ULTRASOUND EA 15: CPT

## 2020-06-01 NOTE — PROGRESS NOTES
01/27/20               Pounds RIGHT LEFT    76.6 42.3   Lateral pinch 19   5   2 point pinch 13   3   3 jaw pinch 14   5     The affected extremity is 44.8% weaker than the unaffected extremity. (affected score/unaffected score, take the total and subtract from 100)      AROM:  WRIST   Current 06/01/20                    Degrees LEFT AROM   Extension/Flexion +70/88  WNL/WNL   Radial/Ulnar Deviation   22/58  WNL/WNL   Forearm Pronation/Supination   80/90  WNL/WNL     AROM:  WRIST   Current 02/26/20                    Degrees LEFT AROM   Extension/Flexion +70/88  WNL/WNL   Radial/Ulnar Deviation   22/58  WNL/WNL   Forearm Pronation/Supination   80/90  WNL/WNL     AROM:  WRIST   Comparison, Previous   02/10/20                    Degrees LEFT AROM   Extension/Flexion +58/58  decr/decr   Radial/Ulnar Deviation   22/58  WNL/WNL   Forearm Pronation/Supination   85/68  WNL/incr     AROM:  WRIST   Comparison, Initial 01/27/20                    Degrees LEFT AROM   Extension/Flexion +60/63   Radial/Ulnar Deviation   18/32   Forearm Pronation/Supination   80/63       AROM:  THUMB   Current 02/10/20   Extension/Flexion     Degrees LEFT   CMC    70/49    WNL/decr   MCP   +2/68     WNL/WNL   IP +10/+5    WNL/same   Blocked IP flexion: 10 degrees    AROM:  THUMB   Comparison, Previous  02/10/20   Extension/Flexion     Degrees LEFT   CMC    75/55    incr/incr   MCP +10//55    incr/incr   IP   +7/+5    decr/incr     AROM:  THUMB   Comparison, Initial 01/27/2  Extension/Flexion     Degrees LEFT   CMC    60/40   MCP      0/50   IP +10/+10       Sensibility: Numbness and diminished sensation are less intense by pt report (improved). Edema: Circumfirential thumb measurements:  P1:  Right 7.4 cm (incr 0.1 cm), Left 7.6 cm (decr 6 cm)   Variance of 0.2 cm (improved)  P2:  Right 7.   cm (incr 0.3 cm), Left 6.9 cm (unchanged)  Variance of 0.1 cm (improved)  A variance of 0.5 cm or more is considered significant edema.     Edema: pounds - MET New  strength goal (revised 2/26/20: Left  strength will be 81 or more pounds (80), pinches will be 10 - 15 pounds - MET   New pinch goal (revised 2/26/20):  pinches will be 16-22 pounds MET   4. Increase function: UE Functional Index Score to 40/80 or more points to promote increased functional abilities. Ongoing  5. Decrease Edema: Variance of circumfirential measurements at P1 of thumbs will be 0.2 cm or less. Ongoing    Patient Goals: Return to work - Unmet, hold a cup with the left hand - Improved, Unmet, sweep with the left hand - Improved, Unmet. New goal (revised 02/10/20): open a door with the left hand - Unmet. Comments/Assessment:  Pt is post-op for second surgery 12/20/19 to repair avulsed left flexor pollicis longus tendon and remove old hematoma in this area. Pt had been immobilized in a thumb spica following surgery. AROM: Wrist AROM, all planes of motion now WNL. The left thumb carpal-metacarpal (CMC) and metacarpal-phalangeal (MCP) joints  have normal motion. The left flexor pollicis longus (FPL) had no visible flexion. With blocked flexion at the inter-phalangeal (IP) joint a ten degree arc of motion is measured, but measurement is still in hyper-extension (+5 degrees). Scar: Scar tissue at the volar thumb and wrist are softening slowly, mild to moderately firm, thickest scar is on volar side of thumb. Scar topography is flattening out, and scar color has faded to light pink (unchanged). Strength: the affected left  has improved 16 pounds (now at 80 pounds)    Ultrasound continued to scars as noted above. Aggressive scar massage program, edema massage, and blocked active range of motion (AROM), putty roll//pinches, and thumbcisor exercise program continued. Recommendations: Pt would benefit from skilled occupational therapy services for functional left hand /grasp to return to work.     Treatment Plan:  Frequency/Duration:     3  Times a week, for 36   Visits. Therapeutic Exercise 83719, Manual Therapy 12157, Ultrasound 09850, Written Home Program and Hot Pack/Cold Pack 39325         Treatment This Date:   Treatment Charges: Mins Units Time In/Out   [x]  Modalities: Ultrasound  8 0 8594 - 8789   [x]  Ther Exercise 34 2 7353 - 7230   [x]  Manual Therapy 11 1 3611 - 9616   []  Ther Activities      []        []        []        Total Treatment time 55 min           Modalities:  Modality Flow Sheet:  START STOP Tx Modality       Electrical Stim:      02/13/20   Ultrasound: 0.8 W/cm2 x 8 mins total  Duty factor: __100%  __50%  __33% __20%  Head size: 2 cm  MHz: __1mHz  __3mHz  Location: dorsal and volar left thumb, 4 minutes each       Hot Pack:       Cold Pack:      Exercises:  Flow Sheet:  Exercise Reps/Time Weight/Level Comments   Edema massage     Not Administered   due to measurements      Scar massage  With mini-massager 5 Minutes     Administered manually      AROM left wrist, thumb 20 reps   Completed  .    Scar management - putty roll  5 minutes  Green  Not Completed   At home       Active composite thumb extension with cotton balls      Discontinued   Digi-sleeve for edema/scar management      Has  XX-Large Digi-Sleeve      Thumbcisor with red rubberband for isolated IP joint flexion with gentle strengthening 25 reps   Not Completed    Resistive pinch/extension with putty   10 reps each         [place]                   Not  Completed                          At home   Ultrasound - see parameters above                       Administered        Total Treatment Time:  54  Minutes     Time In/Time Out: 0800 - 0900       Electronically signed by AMENA Begum, 6/1/2020 at 7:55 AM

## 2020-06-03 ENCOUNTER — HOSPITAL ENCOUNTER (OUTPATIENT)
Dept: OCCUPATIONAL THERAPY | Age: 50
Setting detail: THERAPIES SERIES
Discharge: HOME | End: 2020-06-03
Payer: COMMERCIAL

## 2020-06-03 PROCEDURE — 97110 THERAPEUTIC EXERCISES: CPT

## 2020-06-03 PROCEDURE — 97035 APP MDLTY 1+ULTRASOUND EA 15: CPT

## 2020-06-03 NOTE — FLOWSHEET NOTE
[] [place]       Occupational Therapy             1st floor       [address] [place], [place]         Phone: [phone]       Fax: [phone] [x] [facility] at            [address] , [address] [place] [address]     Phone: [phone]     Fax: [phone]      Occupational Therapy Daily Treatment Note    Date:  6/3/2020  Patient Name:  Altaf Mg    :  1970  MRN: [id number]  Physician: Oren Chavez MD  Insurance: [facility] [employer]     Medical Diagnosis: Laceration of extensor tendon of left thumb [id number].             Rehab Codes: Stiffness in hand M25.64,, edema localized R60.0, pain in left finger(s) M79.645, muscle wasting of hand M62.54, anesthesia of kin R20.0, hyposthesia of skin R20.1,  adherent scar L90.5.      Onset Date: 19               Next Dr. Sun Joyce:    [address] office  #Visitis/Total Visits:     1 time a week until end of current: C-9 auth'd  1/15/20 to 20 extended to 20  Cancels/No Shows:  1/0    Subjective:    Pain:  [x] Yes  [] No Location:Left thumb Pain Rating: (0-10 scale) 4/10   Pain altered Tx:  [x] No  [] Yes  Action: NA  Pt Comments: NA    Objective:  Modalities:  Modality Flow Sheet:  START STOP Tx Modality       Electrical Stim:      20   Ultrasound: 0.8 W/cm2 x 8 mins total  Duty factor: __100%  __50%  __33% __20%  Head size: 2 cm  MHz: __1mHz  __3mHz  Location: dorsal and volar left thumb, 4 minutes each       Hot Pack:       Cold Pack:      Exercises:  Flow Sheet:  Exercise Reps/Time Weight/Level Comments   Edema massage     Not Administered         Scar massage  With Dycemr 5 Minutes     Administered   Replaced Dycem 20   PROM - left thumb    Administered   AROM left  thumb 20 reps   Completed  Wrist discontinued   Scar management - putty roll  5 minutes  Green   Completed  (used yellow flexbar due to pt not having putty)        Active composite thumb extension with cotton balls      Discontinued   Digi-sleeve for edema/scar management      Re-issued 03/11/20:    XX-Large Digi-Sleeve      Thumbcisor with red rubberband for isolated IP joint flexion with gentle strengthening 25 reps   Completed    Resistive pinch/extension with putty   10 reps each   Brown for pinches     Orange for extension                       Completed   Ultrasound - see parameters above                            Administered   Thumb exerciser 20 pounds 15 reps completed this date   Finger platter 20  Thumb IP flexion   Pickle grabber with small pegs 3 rows  Thumb flexion         Comments/Assessment:  Added in addtl exercises this week to promote thumb movement at [facility] and IP joint. Pt is s/p for second surgery 12/20/19 to repair avulsed left flexor pollicis longus tendon and remove old hematoma in this area. Following aggressive scar massage with Dysen and ultrasound pt demo'd visibile IP flexion with blocking (creasing of IP palmar crease observed) see AROM measurements below. No  visual IP movement with composite thumb flexion observed. Pt is actively opposing thumb to P1 area palmar crease of the little finger (unchanged). Scar tissue at the volar thumb and wrist are softening slowly, mild to moderately firm, thickest scar is on volar side of thumb. Scar topography has flattened out, and scar color has faded to light pink (unchanged). Digisleeve continued for assist with edema and scar molding. Worn all the time except exercises/bathing/water activities. Pt not wearing sleeve on arrival. Dycem for aggressive scar massage replaced, dog chewed up Dycem issued last visit. Ultrasound continued to scars as noted above. Aggressive scar massage program, edema massage, and blocked active range of motion (AROM), passive range of motion (PROM), putty roll//pinches), and thumbcisor exercise program continued.        AROM:  THUMB   Current 03/16/20   Extension/Flexion     Degrees LEFT   CMC    65/47 pounds - MET New  strength goal (revised 2/26/20: Left  strength will be 81 or more pounds, pinches will be 10 - 15 pounds - MET   New pinch goal (revised 2/26/20):  pinches will be 16-22 pounds. 4.   Increase function: UE Functional Index Score to 40/80 or more points to promote increased functional abilities. Ongoing  5. Decrease Edema: Variance of circumfirential measurements at P1 of thumbs will be 0.2 cm or less. Ongoing     Patient Goals: Return to work - Unmet, hold a cup with the left hand - Improved, Unmet, sweep with the left hand - Improved, Unmet. New goal (revised 02/10/20): open a door with the left hand - Unmet.        Pt. Education:  [] Yes  [x] No  [] Reviewed Prior HEP/Ed  Method of Education: [] Verbal  [] Demo  [] Written  Re:  Comprehension of Education:  [] Verbalizes understanding. [] Demonstrates understanding. [] Needs review. [] Demonstrates/verbalizes HEP/Ed previously given. Treatment Plan: Promote functional /grasp with left thumb for return to work. Time In/Time Out: 4100-9917  Total Treatment Time: 46 Min.       Electronically signed by:  BRIELLE Swenson/MARGARITA

## 2020-06-05 ENCOUNTER — HOSPITAL ENCOUNTER (OUTPATIENT)
Dept: OCCUPATIONAL THERAPY | Age: 50
Setting detail: THERAPIES SERIES
Discharge: HOME | End: 2020-06-05
Payer: COMMERCIAL

## 2020-06-05 PROCEDURE — 97035 APP MDLTY 1+ULTRASOUND EA 15: CPT

## 2020-06-05 PROCEDURE — 97110 THERAPEUTIC EXERCISES: CPT

## 2020-06-05 NOTE — FLOWSHEET NOTE
[] [place]       Occupational Therapy             1st floor       [address] [place], [place]         Phone: [phone]       Fax: [phone] [x] [address] [facility] at            [address]     Phone: [phone]     Fax: [phone]      Occupational Therapy Daily Treatment Note    Date:  2020  Patient Name:  Ludwig Boyle    :  1970  MRN: [id number]  Physician: Corky Gonzales MD  Insurance: [facility] - [employer]     Medical Diagnosis: Laceration of extensor tendon of left thumb [id number].             Rehab Codes: Stiffness in hand M25.64,, edema localized R60.0, pain in left finger(s) M79.645, muscle wasting of hand M62.54, anesthesia of kin R20.0, hyposthesia of skin R20.1,  adherent scar L90.5.      Onset Date: 19               Next Dr. Gibson Moreno:    [address]  office  #Visitis/Total Visits:     1 time a week until end of current: C-9 auth'd  1/15/20 to 20 extended to 20  Cancels/No Shows:  1/0    Subjective:    Pain:  [x] Yes  [] No Location:Left thumb Pain Rating: (0-10 scale) 4/10   Pain altered Tx:  [x] No  [] Yes  Action: NA  Pt Comments: NA    Objective:  Modalities:  Modality Flow Sheet:  START STOP Tx Modality       Electrical Stim:      20   Ultrasound: 0.8 W/cm2 x 8 mins total  Duty factor: __100%  __50%  __33% __20%  Head size: 2 cm  MHz: __1mHz  __3mHz  Location: dorsal and volar left thumb, 4 minutes each       Hot Pack:       Cold Pack:      Exercises:  Flow Sheet:  Exercise Reps/Time Weight/Level Comments   Edema massage     Not Administered         Scar massage  With Dycemr 5 Minutes     Administered   Replaced Dycem 20   PROM - left thumb    Administered   AROM left  thumb 20 reps   Completed  Wrist discontinued   Scar management - putty roll  5 minutes  Green   Completed  (used yellow flexbar due to pt not having putty)        Active composite thumb extension with cotton Degrees LEFT   CMC    65/47    WNL/decr   MCP   -5/62     decr/WNL   IP +15/+5    WNL/same   Blocked Active IP flexion: 10 degrees       Specific Instructions for Next Treatment:     Treatment Charges: Mins Units Time In/Out   [x]?   Modalities: Ultrasound   8  1 [id number]   [x]?   Ther Exercise 38  3 [id number]   [x]?   Manual Therapy 5  0    []?  Ther Activities         []?           []?           []?           Total Treatment time 51 min           Assessment: [x] Progressing toward goals. [] No change. [] Other:    Short Term Goals: (  10    Treatments)  1. Decrease Pain: Pain will be 3/10 or less with non-resistive to mildly resistive activity - Unmet (4/10). 2. Increase AROM (degrees): Left wrist extension/flexion will be +65/65 or more degrees (WNL) - MET (+70/68), radial/ulnar wrist deviation will be 20/40 or more degrees (WNL) - MET (22/58), left forearm supination will be 80 or more degrees (WNL) - MET (90), left thumb MCP joint flexion will be 55 or more degrees (WNL) - MET (68 degrees), Left thumb IP joint will have a 10 degree arc of motion (currently 0 motion) - MET (5 degree arc of motion). 3. Increase strength (pounds): left  strength will be 52 or more pounds - MET (64 pounds), pinches will be 10 or more pounds - MET for all. 4. Increase function: UE Functional Index Score to 25/80 or more points to promote increased functional abilities - MET (37/80). 5. Scar will be soft and pliable with minimal tethering - Improved, Unmet. 6. Decrease Edema: Variance of circumfirential measurements at P1 of thumbs will be 0.5 cm or less - Improved, Unmet (0.7 cm variance). 7. Independent with HEP in 3 sessions - MET.     Long Term Goals: (  20  Treatments)  1. Decrease Pain: Pain will be 1/10 or less with mild to moderately resistive activity. Ongoing  2. Increase AROM (degrees): Left thumb IP joint will have 20 degrees or more of active flexion. Ongoing  3.    Increase strength (pounds): Left

## 2020-06-08 ENCOUNTER — HOSPITAL ENCOUNTER (OUTPATIENT)
Dept: OCCUPATIONAL THERAPY | Age: 50
Setting detail: THERAPIES SERIES
Discharge: HOME | End: 2020-06-08
Payer: COMMERCIAL

## 2020-06-08 PROCEDURE — 97110 THERAPEUTIC EXERCISES: CPT

## 2020-06-08 PROCEDURE — 97035 APP MDLTY 1+ULTRASOUND EA 15: CPT

## 2020-06-08 NOTE — FLOWSHEET NOTE
balls      Discontinued   Digi-sleeve for edema/scar management      Re-issued 03/11/20:    XX-Large Digi-Sleeve      Thumbcisor with green (15 pounds) rubberband for isolated IP joint flexion with gentle strengthening 25 reps   Completed  Increased this date    Resistive pinch/extension with putty   10 reps each   Brown for pinches     Orange for extension                       Completed   Ultrasound - see parameters above                            Administered   Thumb exerciser 20 pounds 15 reps completed this date   Finger platter 20  Thumb IP flexion   Pickle grabber with small pegs 3 rows  Thumb flexion         Comments/Assessment:  Completed addtl exercises this week to promote thumb movement at [facility] and IP joint. Pt is s/p for second surgery 12/20/19 to repair avulsed left flexor pollicis longus tendon and remove old hematoma in this area. Following aggressive scar massage with Dysen and ultrasound pt demo'd visibile IP flexion with blocking (creasing of IP palmar crease observed) see AROM measurements below. No  visual IP movement with composite thumb flexion observed. Pt is actively opposing thumb to P1 area palmar crease of the little finger (unchanged). Scar tissue at the volar thumb and wrist are softening slowly, mild to moderately firm, thickest scar is on volar side of thumb. Scar topography has flattened out, and scar color has faded to light pink (unchanged). Digisleeve continued for assist with edema and scar molding. Worn all the time except exercises/bathing/water activities. Pt not wearing sleeve on arrival. Dycem for aggressive scar massage replaced, dog chewed up Dycem issued last visit. Ultrasound continued to scars as noted above. Aggressive scar massage program, edema massage, and blocked active range of motion (AROM), passive range of motion (PROM), putty roll//pinches), and thumbcisor exercise program continued.      AROM:  THUMB   Current 06\8/20   Extension/Flexion

## 2020-06-12 ENCOUNTER — HOSPITAL ENCOUNTER (OUTPATIENT)
Dept: OCCUPATIONAL THERAPY | Age: 50
Setting detail: THERAPIES SERIES
Discharge: HOME | End: 2020-06-12
Payer: COMMERCIAL

## 2020-06-12 PROCEDURE — 97110 THERAPEUTIC EXERCISES: CPT

## 2020-06-12 PROCEDURE — 97035 APP MDLTY 1+ULTRASOUND EA 15: CPT

## 2020-06-12 NOTE — FLOWSHEET NOTE
[] [place]       Occupational Therapy             1st floor       [address] [place], [place]         Phone: [phone]       Fax: [phone] [x] [address] [facility] at            [address]     Phone: [phone]     Fax: [phone]      Occupational Therapy Daily Treatment Note    Date:  2020  Patient Name:  Jeannine Giles    :  1970  MRN: [id number]  Physician: Anuradha Guillory MD  Insurance: [facility] [place]     Medical Diagnosis: Laceration of extensor tendon of left thumb [id number].             Rehab Codes: Stiffness in hand M25.64,, edema localized R60.0, pain in left finger(s) M79.645, muscle wasting of hand M62.54, anesthesia of kin R20.0, hyposthesia of skin R20.1,  adherent scar L90.5.      Onset Date: 19               Next Dr. Freeman Back:    [address] [place] office  #Visitis/Total Visits:     1 time a week until end of current: C-9 auth'd  1/15/20 to 20 extended to 20  Cancels/No Shows:  1/0    Subjective:    Pain:  [x] Yes  [] No Location:Left thumb Pain Rating: (0-10 scale) 4/10   Pain altered Tx:  [x] No  [] Yes  Action: NA  Pt Comments: NA    Objective:  Modalities:  Modality Flow Sheet:  START STOP Tx Modality       Electrical Stim:      20   Ultrasound: 0.8 W/cm2 x 8 mins total  Duty factor: __100%  __50%  __33% __20%  Head size: 2 cm  MHz: __1mHz  __3mHz  Location: dorsal and volar left thumb, 4 minutes each       Hot Pack:       Cold Pack:      Exercises:  Flow Sheet:  Exercise Reps/Time Weight/Level Comments   Edema massage     Not Administered         Scar massage  With Dycemr 5 Minutes     Administered   Replaced Dycem 20   PROM - left thumb    Administered   AROM left  thumb 20 reps   Completed  Wrist discontinued   Scar management - putty roll  5 minutes  Green   Completed  (used yellow flexbar due to pt not having putty)        Active composite thumb extension with cotton balls      Discontinued   Digi-sleeve for edema/scar management      Re-issued 03/11/20:    XX-Large Digi-Sleeve      Thumbcisor with green (15 pounds) rubberband for isolated IP joint flexion with gentle strengthening 25 reps   Completed  Increased this date    Resistive pinch/extension with putty   10 reps each   Brown for pinches     Orange for extension                       Completed   Ultrasound - see parameters above                            Administered   Thumb exerciser 20 pounds 15 reps completed this date   Finger platter 20  Thumb IP flexion   Pickle grabber with small pegs 3 rows  Thumb flexion   Flexbar   strength  Wrist flexion  Wrist pronation  Wrist supination  Wrist ulnar dev  Wrist radial dev  Shoulder add  Shoulder abd 10  yellow Added this date with good return         Comments/Assessment:  Completed addtl exercises this week to promote thumb movement at [facility] and IP joint. Pt is s/p for second surgery 12/20/19 to repair avulsed left flexor pollicis longus tendon and remove old hematoma in this area. Scar tissue at the volar thumb and wrist are softening slowly, mild to moderately firm, thickest scar is on volar side of thumb. Scar topography has flattened out, and scar color has faded to light pink (unchanged). Digisleeve continued for assist with edema and scar molding. Worn all the time except exercises/bathing/water activities. Pt not wearing sleeve on arrival. Dycem for aggressive scar massage replaced, dog chewed up Dycem issued last visit. Ultrasound continued to scars as noted above. Aggressive scar massage program, edema massage, and blocked active range of motion (AROM), passive range of motion (PROM), putty roll//pinches), and thumbcisor exercise program continued.      AROM:  THUMB   Current 06\8/20   Extension/Flexion     Degrees LEFT   CMC  70/15    WNL/decr   MCP   +5/65    decr/WNL   IP +16/-5    WNL/same   Blocked Active IP flexion: 15 active flexion. Ongoing  3. Increase strength (pounds): Left  strength will be 62 or more pounds - MET New  strength goal (revised 2/26/20: Left  strength will be 81 or more pounds, pinches will be 10 - 15 pounds - MET   New pinch goal (revised 2/26/20):  pinches will be 16-22 pounds. 4.   Increase function: UE Functional Index Score to 40/80 or more points to promote increased functional abilities. Ongoing  5. Decrease Edema: Variance of circumfirential measurements at P1 of thumbs will be 0.2 cm or less. Ongoing     Patient Goals: Return to work - Unmet, hold a cup with the left hand - Improved, Unmet, sweep with the left hand - Improved, Unmet. New goal (revised 02/10/20): open a door with the left hand - Unmet.        Pt. Education:  [] Yes  [x] No  [] Reviewed Prior HEP/Ed  Method of Education: [] Verbal  [] Demo  [] Written  Re:  Comprehension of Education:  [x] Verbalizes understanding. [] Demonstrates understanding. [] Needs review. [] Demonstrates/verbalizes HEP/Ed previously given. Treatment Plan: Promote functional /grasp with left thumb for return to work. Time In/Time Out: 5088-9685  Total Treatment Time: 46 Min.       Electronically signed by:  BRIELLE Veloz/MARGARITA

## 2020-06-18 ENCOUNTER — HOSPITAL ENCOUNTER (OUTPATIENT)
Dept: OCCUPATIONAL THERAPY | Age: 50
Setting detail: THERAPIES SERIES
Discharge: HOME | End: 2020-06-18
Payer: COMMERCIAL

## 2020-06-22 ENCOUNTER — HOSPITAL ENCOUNTER (OUTPATIENT)
Dept: OCCUPATIONAL THERAPY | Age: 50
Setting detail: THERAPIES SERIES
Discharge: HOME | End: 2020-06-22
Payer: COMMERCIAL

## 2020-06-22 NOTE — SIGNIFICANT EVENT
[x] [address] Occupational Therapy       [address] [facility], 1st Floor       Phone: [phone]       Fax: [phone] [] [facility] Occupational  Therapy at [address] [place] [address].  [place], [place]       Phone: [phone]       Fax: [phone]          Occupational Therapy Cancel/No Show note    Date: 2020  Patient: Jeannine Giles  : 1970  MRN: [id number]  Cancels/No Shows to date: 3/1    For today's appointment patient:  [x]  Cancelled  []  Rescheduled appointment  []  No-show/ no call     Reason given by patient:  []  Patient ill  []  Conflicting appointment  []  No transportation    []  Conflict with work  []  No reason given  [x]  Other:     Comments:  Took a job and cannot make the times  Electronically signed by: BRIELLE Gregory/MARGARITA,

## 2020-06-24 ENCOUNTER — HOSPITAL ENCOUNTER (OUTPATIENT)
Dept: OCCUPATIONAL THERAPY | Age: 50
Setting detail: THERAPIES SERIES
Discharge: HOME | End: 2020-06-24
Payer: COMMERCIAL

## 2020-06-26 ENCOUNTER — HOSPITAL ENCOUNTER (OUTPATIENT)
Dept: OCCUPATIONAL THERAPY | Age: 50
Setting detail: THERAPIES SERIES
End: 2020-06-26
Payer: COMMERCIAL

## 2020-06-29 ENCOUNTER — HOSPITAL ENCOUNTER (OUTPATIENT)
Dept: OCCUPATIONAL THERAPY | Age: 50
Setting detail: THERAPIES SERIES
Discharge: HOME | End: 2020-06-29
Payer: COMMERCIAL

## 2020-06-29 PROCEDURE — 97110 THERAPEUTIC EXERCISES: CPT

## 2020-06-29 PROCEDURE — 97035 APP MDLTY 1+ULTRASOUND EA 15: CPT

## 2020-06-29 NOTE — FLOWSHEET NOTE
[] [place]       Occupational Therapy             1st floor       [address], [place]         Phone: [phone]       Fax: [phone] [x] [address] at            [address] , [address] [place] [address]     Phone: [phone]     Fax: [phone]      Occupational Therapy Daily Treatment Note    Date:  2020  Patient Name:  Nadya Murray    :  1970  MRN: [id number]  Physician: Mamie Rao MD  Insurance: [facility] [employer]     Medical Diagnosis: Laceration of extensor tendon of left thumb [id number].             Rehab Codes: Stiffness in hand M25.64,, edema localized R60.0, pain in left finger(s) M79.645, muscle wasting of hand M62.54, anesthesia of kin R20.0, hyposthesia of skin R20.1,  adherent scar L90.5.      Onset Date: 19               Next Dr. hSawna Mendoza:    [address] [facility] office  #Visitis/Total Visits:     1 time a week until end of current: C-9 auth'd  1/15/20 to 20 extended to 20  Cancels/No Shows:  1/0    Subjective:    Pain:  [x] Yes  [] No Location:Left thumb Pain Rating: (0-10 scale) 4/10   Pain altered Tx:  [x] No  [] Yes  Action: NA  Pt Comments: NA    Objective:  Modalities:  Modality Flow Sheet:  START STOP Tx Modality       Electrical Stim:      20   Ultrasound: 0.8 W/cm2 x 8 mins total  Duty factor: __100%  __50%  __33% __20%  Head size: 2 cm  MHz: __1mHz  __3mHz  Location: dorsal and volar left thumb, 4 minutes each       Hot Pack:       Cold Pack:      Exercises:  Flow Sheet:  Exercise Reps/Time Weight/Level Comments   Edema massage     Not Administered         Scar massage  With Dycemr 5 Minutes     Administered   Replaced Dycem 20   PROM - left thumb    Administered   AROM left  thumb 20 reps   Completed  Wrist discontinued   Scar management - putty roll  5 minutes  Green   Completed  (used yellow flexbar due to pt not having putty)        Active composite thumb extension with cotton strength will be 81 or more pounds, pinches will be 10 - 15 pounds - MET   New pinch goal (revised 2/26/20):  pinches will be 16-22 pounds. 4.   Increase function: UE Functional Index Score to 40/80 or more points to promote increased functional abilities. Ongoing  5. Decrease Edema: Variance of circumfirential measurements at P1 of thumbs will be 0.2 cm or less. Ongoing     Patient Goals: Return to work - Unmet, hold a cup with the left hand - Improved, Unmet, sweep with the left hand - Improved, Unmet. New goal (revised 02/10/20): open a door with the left hand - Unmet.        Pt. Education:  [] Yes  [x] No  [] Reviewed Prior HEP/Ed  Method of Education: [] Verbal  [] Demo  [] Written  Re:  Comprehension of Education:  [x] Verbalizes understanding. [] Demonstrates understanding. [] Needs review. [] Demonstrates/verbalizes HEP/Ed previously given. Treatment Plan: Promote functional /grasp with left thumb for increased productivity with work. Time In/Time Out: 2182-8284  Total Treatment Time: 36 Min.       Electronically signed by:  BRIELLE Castro/MARGARITA

## 2020-07-01 ENCOUNTER — HOSPITAL ENCOUNTER (OUTPATIENT)
Dept: OCCUPATIONAL THERAPY | Age: 50
Setting detail: THERAPIES SERIES
Discharge: HOME | End: 2020-07-01
Payer: COMMERCIAL

## 2020-07-01 PROCEDURE — 97110 THERAPEUTIC EXERCISES: CPT

## 2020-07-01 PROCEDURE — 97035 APP MDLTY 1+ULTRASOUND EA 15: CPT

## 2020-07-01 NOTE — FLOWSHEET NOTE
balls      Discontinued   Digi-sleeve for edema/scar management      Re-issued 03/11/20:    XX-Large Digi-Sleeve      Thumbcisor with green (15 pounds) rubberband for isolated IP joint flexion with gentle strengthening 25 reps   completed    Resistive pinch/extension with putty   10 reps each   Brown for pinches     Orange for extension                    Not   Completed (forgot putty)    Ultrasound - see parameters above                            Administered   Thumb exerciser 20 pounds 15 reps completed this date   Finger platter 20  Thumb IP flexion   Pickle grabber with small pegs 3 rows  Thumb flexion   Flexbar   strength  Wrist flexion  Wrist pronation  Wrist supination  Wrist ulnar dev  Wrist radial dev  Shoulder add  Shoulder abd 10  Red completed         Comments/Assessment:  Completed exercises this week to promote thumb movement at [facility] and IP joint. Pt C9 ends on 7-8-2 and pt has been scheduled until last date. Scar tissue at the volar thumb and wrist are softening slowly, mild to moderately firm, thickest scar is on volar side of thumb. Scar topography has flattened out, and scar color has faded to light pink (unchanged). Ultrasound continued to scars as noted above. Aggressive scar massage program, edema massage, and blocked active range of motion (AROM), passive range of motion (PROM), putty roll//pinches), and thumbcisor exercise program continued. AROM:  THUMB   Current 06\8/20   Extension/Flexion     Degrees LEFT   CMC  70/15    WNL/decr   MCP   +5/65    decr/WNL   IP +16/-5    WNL/same   Blocked Active IP flexion: 15 degrees      AROM:  THUMB   Current 03/16/20   Extension/Flexion     Degrees LEFT   CMC    65/47    WNL/decr   MCP   -5/62     decr/WNL   IP +15/+5    WNL/same   Blocked Active IP flexion: 10 degrees       Specific Instructions for Next Treatment:     Treatment Charges: Mins Units Time In/Out   [x]?   Modalities: Ultrasound   8  5 [id number]   [x]?   Ther Exercise 38 Jaspreet Muniz [id number]   []?  Manual Therapy       []?  Ther Activities         []?           []?           []?           Total Treatment time 46 min           Assessment: [x] Progressing toward goals. [] No change. [] Other:    Short Term Goals: (  10    Treatments)  1. Decrease Pain: Pain will be 3/10 or less with non-resistive to mildly resistive activity - Unmet (4/10). 2. Increase AROM (degrees): Left wrist extension/flexion will be +65/65 or more degrees (WNL) - MET (+70/68), radial/ulnar wrist deviation will be 20/40 or more degrees (WNL) - MET (22/58), left forearm supination will be 80 or more degrees (WNL) - MET (90), left thumb MCP joint flexion will be 55 or more degrees (WNL) - MET (68 degrees), Left thumb IP joint will have a 10 degree arc of motion (currently 0 motion) - MET (5 degree arc of motion). 3. Increase strength (pounds): left  strength will be 52 or more pounds - MET (64 pounds), pinches will be 10 or more pounds - MET for all. 4. Increase function: UE Functional Index Score to 25/80 or more points to promote increased functional abilities - MET (37/80). 5. Scar will be soft and pliable with minimal tethering - Improved, Unmet. 6. Decrease Edema: Variance of circumfirential measurements at P1 of thumbs will be 0.5 cm or less - Improved, Unmet (0.7 cm variance). 7. Independent with HEP in 3 sessions - MET.     Long Term Goals: (  20  Treatments)  1. Decrease Pain: Pain will be 1/10 or less with mild to moderately resistive activity. Ongoing  2. Increase AROM (degrees): Left thumb IP joint will have 20 degrees or more of active flexion. Ongoing  3. Increase strength (pounds): Left  strength will be 62 or more pounds - MET New  strength goal (revised 2/26/20: Left  strength will be 81 or more pounds, pinches will be 10 - 15 pounds - MET   New pinch goal (revised 2/26/20):  pinches will be 16-22 pounds.    4.   Increase function: UE Functional Index Score to 40/80 or more points to promote increased functional abilities. Ongoing  5. Decrease Edema: Variance of circumfirential measurements at P1 of thumbs will be 0.2 cm or less. Ongoing     Patient Goals: Return to work - Unmet, hold a cup with the left hand - Improved, Unmet, sweep with the left hand - Improved, Unmet. New goal (revised 02/10/20): open a door with the left hand - Unmet.        Pt. Education:  [] Yes  [x] No  [] Reviewed Prior HEP/Ed  Method of Education: [] Verbal  [] Demo  [] Written  Re:  Comprehension of Education:  [x] Verbalizes understanding. [] Demonstrates understanding. [] Needs review. [] Demonstrates/verbalizes HEP/Ed previously given. Treatment Plan: Promote functional /grasp with left thumb for increased productivity with work. Time In/Time Out: 5309-9165  Total Treatment Time: 50 Min.       Electronically signed by:  BRIELLE Walker/MARGARITA

## 2020-07-06 ENCOUNTER — HOSPITAL ENCOUNTER (OUTPATIENT)
Dept: OCCUPATIONAL THERAPY | Age: 50
Setting detail: THERAPIES SERIES
Discharge: HOME | End: 2020-07-06
Payer: COMMERCIAL

## 2020-07-06 PROCEDURE — 97110 THERAPEUTIC EXERCISES: CPT

## 2020-07-06 PROCEDURE — 97035 APP MDLTY 1+ULTRASOUND EA 15: CPT

## 2020-07-06 NOTE — FLOWSHEET NOTE
balls      Discontinued   Digi-sleeve for edema/scar management      Re-issued 03/11/20:    XX-Large Digi-Sleeve      Thumbcisor with green (15 pounds) rubberband for isolated IP joint flexion with gentle strengthening 25 reps   completed    Resistive pinch/extension with putty   10 reps each   Brown for pinches     Orange for extension                    Not   Completed (forgot putty)    Ultrasound - see parameters above                            Administered   Thumb exerciser 20 pounds 15 reps completed this date   Finger platter 20  Thumb IP flexion   Pickle grabber with small pegs 3 rows  Thumb flexion   Flexbar   strength  Wrist flexion  Wrist pronation  Wrist supination  Wrist ulnar dev  Wrist radial dev  Shoulder add  Shoulder abd 10  Red completed         Comments/Assessment:  Completed exercises this week to promote thumb movement at [facility] and IP joint. Pt C9 ends on 7-8-2 and pt has been scheduled until last date. Scar tissue at the volar thumb and wrist are softening slowly, mild to moderately firm, thickest scar is on volar side of thumb. Scar topography has flattened out, and scar color has faded to light pink (unchanged). Ultrasound continued to scars as noted above. Aggressive scar massage program, edema massage, and blocked active range of motion (AROM), passive range of motion (PROM), putty roll//pinches), and thumbcisor exercise program continued. AROM:  THUMB   Current 06\8/20   Extension/Flexion     Degrees LEFT   CMC  70/15    WNL/decr   MCP   +5/65    decr/WNL   IP +16/-5    WNL/same   Blocked Active IP flexion: 15 degrees      AROM:  THUMB   Current 03/16/20   Extension/Flexion     Degrees LEFT   CMC    65/47    WNL/decr   MCP   -5/62     decr/WNL   IP +15/+5    WNL/same   Blocked Active IP flexion: 10 degrees       Specific Instructions for Next Treatment:     Treatment Charges: Mins Units Time In/Out   [x]?   Modalities: Ultrasound   8  7 [id number]   [x]?   Ther Exercise 32 Nataliya Goff [id number]   []?  Manual Therapy       []?  Ther Activities         []?           []?           []?           Total Treatment time 41 min           Assessment: [x] Progressing toward goals. [] No change. [] Other:    Short Term Goals: (  10    Treatments)  1. Decrease Pain: Pain will be 3/10 or less with non-resistive to mildly resistive activity - Unmet (4/10). 2. Increase AROM (degrees): Left wrist extension/flexion will be +65/65 or more degrees (WNL) - MET (+70/68), radial/ulnar wrist deviation will be 20/40 or more degrees (WNL) - MET (22/58), left forearm supination will be 80 or more degrees (WNL) - MET (90), left thumb MCP joint flexion will be 55 or more degrees (WNL) - MET (68 degrees), Left thumb IP joint will have a 10 degree arc of motion (currently 0 motion) - MET (5 degree arc of motion). 3. Increase strength (pounds): left  strength will be 52 or more pounds - MET (64 pounds), pinches will be 10 or more pounds - MET for all. 4. Increase function: UE Functional Index Score to 25/80 or more points to promote increased functional abilities - MET (37/80). 5. Scar will be soft and pliable with minimal tethering - Improved, Unmet. 6. Decrease Edema: Variance of circumfirential measurements at P1 of thumbs will be 0.5 cm or less - Improved, Unmet (0.7 cm variance). 7. Independent with [facility] in 3 sessions - MET.     Long Term Goals: (  20  Treatments)  1. Decrease Pain: Pain will be 1/10 or less with mild to moderately resistive activity. Ongoing  2. Increase AROM (degrees): Left thumb IP joint will have 20 degrees or more of active flexion. Ongoing  3. Increase strength (pounds): Left  strength will be 62 or more pounds - MET New  strength goal (revised 2/26/20: Left  strength will be 81 or more pounds, pinches will be 10 - 15 pounds - MET   New pinch goal (revised 2/26/20):  pinches will be 16-22 pounds.    4.   Increase function: UE Functional Index Score to 40/80 or more points to promote increased functional abilities. Ongoing  5. Decrease Edema: Variance of circumfirential measurements at P1 of thumbs will be 0.2 cm or less. Ongoing     Patient Goals: Return to work - Unmet, hold a cup with the left hand - Improved, Unmet, sweep with the left hand - Improved, Unmet. New goal (revised 02/10/20): open a door with the left hand - Unmet.        Pt. Education:  [] Yes  [x] No  [] Reviewed Prior HEP/Ed  Method of Education: [] Verbal  [] Demo  [] Written  Re:  Comprehension of Education:  [x] Verbalizes understanding. [] Demonstrates understanding. [] Needs review. [] Demonstrates/verbalizes HEP/Ed previously given. Treatment Plan: Promote functional /grasp with left thumb for increased productivity with work. Time In/Time Out: 0657-8951  Total Treatment Time: 39 Min.       Electronically signed by:  Lanny Nissen, OTR/MARGARITA

## 2020-07-08 ENCOUNTER — HOSPITAL ENCOUNTER (OUTPATIENT)
Dept: OCCUPATIONAL THERAPY | Age: 50
Setting detail: THERAPIES SERIES
Discharge: HOME | End: 2020-07-08
Payer: COMMERCIAL

## 2020-07-08 PROCEDURE — 97035 APP MDLTY 1+ULTRASOUND EA 15: CPT

## 2020-07-08 PROCEDURE — 97110 THERAPEUTIC EXERCISES: CPT

## 2020-07-08 NOTE — PROGRESS NOTES
[x] [id number] [facility] floor       [address] [facility]         Phone: [phone]       Fax: [phone] [x] [address] [facility] [address] [place] [address] , [address] [place] [address] Road  Phone: [phone]  Fax: [phone]       Occupational Therapy Hand & Upper Extremity  Discharge Note      Date: 2020      Patient: Akanksha Mireles  : 1970  MRN: [id number]    Physician: Heidi Troncoso MD  Insurance: [facility]     Medical Diagnosis: Laceration of extensor tendon of left thumb U98.204L. Rehab Codes: Stiffness in hand M25.64,, edema localized R60.0, pain in left finger(s) M79.645, muscle wasting of hand M62.54, anesthesia of kin R20.0, hyposthesia of skin R20.1,  adherent scar L90.5. Onset Date: 19    Next Dr. Marija Currie: 20  1:00  PM  [address] [facility] office    #Visitis/Total Visits:  29/36  3 times a week for 36 visits C-9 auth'd  1/15/20 to 20 extended to 2020  Cancels/No Shows:  0/0    Past Medical History: Seizures, medication controlled. Medications:   Refer to Medical chart in [facility]    Allergies:    Refer to Medical chart in [facility]       Mechanism of Injury: Lifting a 55 gallon drum with steel shavings     Surgery Date:  11/15/19, 19    Precautions: NA         Involved Extremity:        Left    Dominant Extremity: Right    Previous Level of Function: Independent  Critical Job/Daily Task Description: Self care, household tasks, driving, gripping/grasping, using a micrometer. Work Status: Off due to injury/Condition  Orthosis:    NA    Subjective:  Chief Complaint:  Lack of motion  Pain: Intensity:   4/10 Location: Left thumb  (unchanged)   Pain Type:  Intermittant    Pain Altered Tx: no  Action Taken:none  Pt Comments:  NA    Objective:  Tests/Measurements: Upper Extremity Functional Index  Discharge Note Functional Level:  60/80  Progress Note Functional Level: 60/80 Pt reports a significant improvement in functional abilities    Functional Level:  37/80 functionally impaired as measured with the Upper Extremity Functional Index Survey. 0-80 scale, with 80 = no Deficits   Previous Functional Level:  27/80 functionally impaired as measured with the Upper Extremity Functional Index Survey. Initial Functional Level:  3/80 functionally impaired as measured with the Upper Extremity Functional Index Survey. (The UEFI model does not provide any specific cut off points that could classify the upper limb disability degree, however, a minimal detectable change of 9 points is provided. This means that for improvement or deterioration to be considered, between two subsequent evaluations, the scores must differ by at least 9 points.)    STRENGTH   Current 07/08/20               Pounds RIGHT LEFT    95    95   incr 15   Lateral pinch 22     incr 1 22   incr 9   2 point pinch 16     decr 1 17  incr 7   3 jaw pinch 18     same 18   incr 6     The affected extremity is 16% weaker than the unaffected extremity. (affected score/unaffected score, take the total and subtract from 100    STRENGTH   Current 06/01/20               Pounds RIGHT LEFT    95    80    incr 16   Lateral pinch 22     incr 1 22   incr 9   2 point pinch 16     decr 1 17  incr 7   3 jaw pinch 18     same 18   incr 6     The affected extremity is 00% weaker than the unaffected extremity. (affected score/unaffected score, take the total and subtract from 100    STRENGTH   Current 02/26/20               Pounds RIGHT LEFT    81     decr 0.6 64     incr 11.7   Lateral pinch 22     incr 1 13     incr 3   2 point pinch 16     decr 1 10     incr 3   3 jaw pinch 18     same 12     incr 3     The affected extremity is 21% weaker than the unaffected extremity, an 15% improvement from the previous measurement.   (affected score/unaffected score, take the total and subtract from 100    STRENGTH   Comparison, Previous  02/10/20               Pounds RIGHT LEFT    81.6 incr 5 52.3  incr 10   Lateral pinch 21     incr 2 10     incr 5   2 point pinch 17     incr 4   7     incr 3   3 jaw pinch 18     incr 4   9     incr 4     The affected extremity is 36%% weaker than the unaffected extremity, an 8.8% improvement from the previous measurement. (affected score/unaffected score, take the total and subtract from 100    STRENGTH   Comparison, Initial 01/27/20               Pounds RIGHT LEFT    76.6 42.3   Lateral pinch 19   5   2 point pinch 13   3   3 jaw pinch 14   5     The affected extremity is 44.8% weaker than the unaffected extremity.   (affected score/unaffected score, take the total and subtract from 100)      AROM:  WRIST   Current 06/01/20                    Degrees LEFT AROM   Extension/Flexion +70/88  WNL/WNL   Radial/Ulnar Deviation   22/58  WNL/WNL   Forearm Pronation/Supination   80/90  WNL/WNL     AROM:  WRIST   Current 02/26/20                    Degrees LEFT AROM   Extension/Flexion +70/88  WNL/WNL   Radial/Ulnar Deviation   22/58  WNL/WNL   Forearm Pronation/Supination   80/90  WNL/WNL     AROM:  WRIST   Comparison, Previous   02/10/20                    Degrees LEFT AROM   Extension/Flexion +58/58  decr/decr   Radial/Ulnar Deviation   22/58  WNL/WNL   Forearm Pronation/Supination   85/68  WNL/incr     AROM:  WRIST   Comparison, Initial 01/27/20                    Degrees LEFT AROM   Extension/Flexion +60/63   Radial/Ulnar Deviation   18/32   Forearm Pronation/Supination   80/63       AROM:  THUMB   Current 02/10/20   Extension/Flexion     Degrees LEFT   CMC    70/49    WNL/decr   MCP   +2/68     WNL/WNL   IP +10/+5    WNL/same   Blocked IP flexion: 10 degrees    AROM:  THUMB   Comparison, Previous  02/10/20   Extension/Flexion     Degrees LEFT   CMC    75/55    incr/incr   MCP +10//55    incr/incr   IP   +7/+5    decr/incr     AROM:  THUMB   Comparison, Initial 01/27/2  Extension/Flexion     Degrees LEFT   CMC    60/40   MCP      0/50   IP +10/+10 Sensibility: Numbness and diminished sensation are less intense by pt report (improved). Edema: Circumfirential thumb measurements:  P1:  Right 7.4 cm (incr 0.1 cm), Left 7.6 cm (decr 6 cm)     P2:  Right 7.   cm (incr 0.3 cm), Left 6.5 cm (dec . 4)    A variance of 0.5 cm or more is considered significant edema. Edema: Circumfirential thumb measurements:  P1:  Right 7.4 cm (incr 0.1 cm), Left 7.6 cm (decr 6 cm)   Variance of 0.2 cm (improved)  P2:  Right 7.   cm (incr 0.3 cm), Left 6.9 cm (unchanged)  Variance of 0.1 cm (improved)  A variance of 0.5 cm or more is considered significant edema. Edema: Circumfirential thumb measurements:  P1:  Right 7.4 cm (incr 0.1 cm), Left 8.1cm (decr 0.3 cm)   Variance of 0.7 cm (improved)  P2:  Right 7.   cm (incr 0.3 cm), Left 6.9 cm (unchanged)  Variance of 0.1 cm (improved)  A variance of 0.5 cm or more is considered significant edema. Skin/Scar Color: Scar light pink in color, surrounding skin normal coloring. Skin/Scar Condition: Closed and dry. Problems: Pain, ROM, Strength, Function, Edema, Adherent Scar, Open Wound, Coordination and Sensation      Short Term Goals: (  10    Treatments)  1. Decrease Pain: Pain will be 3/10 or less with non-resistive to mildly resistive activity - met (3/10). 2. Increase AROM (degrees): Left wrist extension/flexion will be +65/65 or more degrees (WNL) - MET (+70/68), radial/ulnar wrist deviation will be 20/40 or more degrees (WNL) - MET (22/58), left forearm supination will be 80 or more degrees (WNL) - MET (90), left thumb MCP joint flexion will be 55 or more degrees (WNL) - MET (68 degrees), Left thumb IP joint will have a 10 degree arc of motion (currently 0 motion) - MET (5 degree arc of motion). 3. Increase strength (pounds): left  strength will be 52 or more pounds - MET (64 pounds), pinches will be 10 or more pounds - MET for all.   4. Increase function: UE Functional Index Score to 25/80 or more points to promote increased functional abilities - MET (37/80). 5. Scar will be soft and pliable with minimal tethering - MET  6. Decrease Edema: Variance of circumfirential measurements at P1 of thumbs will be 0.5 cm or less - met   7. Independent with HEP in 3 sessions - MET. Long Term Goals: (  20  Treatments)  1. Decrease Pain: Pain will be 1/10 or less with mild to moderately resistive activity. Ongoing  2. Increase AROM (degrees): Left thumb IP joint will have 20 degrees or more of active flexion. Ongoing  3. Increase strength (pounds): Left  strength will be 62 or more pounds - MET New  strength goal (revised 2/26/20: Left  strength will be 81 or more pounds (80), pinches will be 10 - 15 pounds - MET   New pinch goal (revised 2/26/20):  pinches will be 16-22 pounds MET   4. Increase function: UE Functional Index Score to 40/80 or more points to promote increased functional abilities. MET  5. Decrease Edema: Variance of circumfirential measurements at P1 of thumbs will be 0.2 cm or less. MET    Patient Goals: Return to work - Unmet, hold a cup with the left hand - Improved, Unmet, sweep with the left hand - Improved, Unmet. New goal (revised 02/10/20): open a door with the left hand - Unmet. Comments/Assessment:  Pt is post-op for second surgery 12/20/19 to repair avulsed left flexor pollicis longus tendon and remove old hematoma in this area. Pt had been immobilized in a thumb spica following surgery. Pt has met goals and has met max potential at this time. Pt to be discharged from OT services at this time. AROM: Wrist AROM, all planes of motion now WNL. The left thumb carpal-metacarpal (CMC) and metacarpal-phalangeal (MCP) joints  have normal motion. The left flexor pollicis longus (FPL) had no visible flexion. With blocked flexion at the inter-phalangeal (IP) joint a ten degree arc of motion is measured, but measurement is still in hyper-extension (+5 degrees).      Scar: Scar tissue at the volar thumb and wrist are soft. Scar topography is flattening out, and scar color has faded to light pink (unchanged). Strength: the affected left  has improved 15 pounds (now at 95 pounds)    Ultrasound continued to scars as noted above. Aggressive scar massage program, edema massage, and blocked active range of motion (AROM), putty roll//pinches, and thumbcisor exercise program continued. Recommendations: Pt would benefit from skilled occupational therapy services for functional left hand /grasp to return to work. Treatment Plan:  Frequency/Duration:     3  Times a week, for   36   Visits.   Therapeutic Exercise 77196, Manual Therapy 86072, Ultrasound 32552, Written Home Program and Hot Pack/Cold Pack 41944         Treatment This Date:   Treatment Charges: Mins Units Time In/Out   [x]  Modalities: Ultrasound  8 1 0856 - 2065   [x]  Ther Exercise 38 3 4109 - 0159   [x]  Manual Therapy 5 0 9860- 4890   []  Ther Activities      []        []        []        Total Treatment time 55 min         Objective:  Modalities:  Modality Flow Sheet:  START STOP Tx Modality       Electrical Stim:      02/13/20   Ultrasound: 0.8 W/cm2 x 8 mins total  Duty factor: __100%  __50%  __33% __20%  Head size: 2 cm  MHz: __1mHz  __3mHz  Location: dorsal and volar left thumb, 4 minutes each       Hot Pack:       Cold Pack:      Exercises:  Flow Sheet:  Exercise Reps/Time Weight/Level Comments   Edema massage     Not Administered          Scar massage  With Dycemr 5 Minutes     Administered   Replaced Dycem 03/16/20   PROM - left thumb      Administered   AROM left  thumb 20 reps   Completed  Wrist discontinued   Scar management - putty roll  5 minutes  Green   Completed  (used yellow flexbar due to pt not having putty)         Active composite thumb extension with cotton balls      Discontinued   Digi-sleeve for edema/scar management      Re-issued 03/11/20:    XX-Large Digi-Sleeve      Thumbcisor with green (15 pounds) rubberband for isolated IP joint flexion with gentle strengthening 25 reps   completed    Resistive pinch/extension with putty   10 reps each   Brown for pinches     Orange for extension                    Not   Completed (forgot putty)    Ultrasound - see parameters above                            Administered   Thumb exerciser 20 pounds 15 reps completed this date   Finger platter 20   Thumb IP flexion   Pickle grabber with small pegs 3 rows   Thumb flexion   Flexbar   strength  Wrist flexion  Wrist pronation  Wrist supination  Wrist ulnar dev  Wrist radial dev  Shoulder add  Shoulder abd 10  Red             Total Treatment Time:  54  Minutes     Time In/Time Out: 0800 - 0900       Electronically signed by BRIELLE Mazariegos/L, 7/8/2020 at 6:58 AM

## 2020-11-27 ENCOUNTER — HOSPITAL ENCOUNTER (OUTPATIENT)
Age: 50
Setting detail: SPECIMEN
Discharge: HOME | End: 2020-11-27
Payer: COMMERCIAL

## 2020-11-27 ENCOUNTER — OFFICE VISIT (OUTPATIENT)
Dept: PRIMARY CARE CLINIC | Age: 50
End: 2020-11-27
Payer: COMMERCIAL

## 2020-11-27 VITALS
HEIGHT: 72 IN | OXYGEN SATURATION: 97 % | DIASTOLIC BLOOD PRESSURE: 72 MMHG | BODY MASS INDEX: 25.06 KG/M2 | WEIGHT: 185 LBS | TEMPERATURE: 98.7 F | SYSTOLIC BLOOD PRESSURE: 123 MMHG | HEART RATE: 86 BPM

## 2020-11-27 PROCEDURE — G8484 FLU IMMUNIZE NO ADMIN: HCPCS | Performed by: INTERNAL MEDICINE

## 2020-11-27 PROCEDURE — 99202 OFFICE O/P NEW SF 15 MIN: CPT | Performed by: INTERNAL MEDICINE

## 2020-11-27 PROCEDURE — 1036F TOBACCO NON-USER: CPT | Performed by: INTERNAL MEDICINE

## 2020-11-27 PROCEDURE — G8427 DOCREV CUR MEDS BY ELIG CLIN: HCPCS | Performed by: INTERNAL MEDICINE

## 2020-11-27 PROCEDURE — G8419 CALC BMI OUT NRM PARAM NOF/U: HCPCS | Performed by: INTERNAL MEDICINE

## 2020-11-27 ASSESSMENT — ENCOUNTER SYMPTOMS: FLU SYMPTOMS: 1

## 2020-11-27 NOTE — PROGRESS NOTES
[place] [address]  Dept: [phone]  Dept Fax: [phone]    Tyler Rich is a 52 y.o. male who presents to the urgent care today for his medicalconditions/complaints as noted below. Tyler Rich is c/o of Other (loss of taste and smell onset for 2-3 days)      HPI:     Influenza   This is a new problem. The current episode started in the past 7 days. The problem occurs constantly. The problem has been unchanged. Associated symptoms include congestion and a fever. Nothing aggravates the symptoms. He has tried nothing for the symptoms. The treatment provided no relief. Past Medical History:   Diagnosis Date    Seizure disorder, grand mal ([facility] [address]         Current Outpatient Medications   Medication Sig Dispense Refill    phenytoin (DILANTIN) 100 MG ER capsule Take 100 mg by mouth 2 times daily Indications: History of Seizures 200mg at 2pm, 400mg at 2am      lamoTRIgine (LAMICTAL) 100 MG tablet Take 200 mg by mouth daily Indications: History of Seizures 700mg at 2pm, 800mg at 2am       No current facility-administered medications for this visit. Allergies   Allergen Reactions    Nuts [Peanut-Containing Drug Products] Itching    No Known Allergies        Health Maintenance   Topic Date Due    HIV screen  11/04/1987    DTaP/Tdap/Td vaccine (1 - Tdap) 11/04/1991    Lipid screen  11/04/2012    Diabetes screen  11/04/2012    Flu vaccine (1) 09/01/2020    Hepatitis A vaccine  Aged Out    Hepatitis B vaccine  Aged Out    Hib vaccine  Aged Out    Meningococcal (ACWY) vaccine  Aged Out    Pneumococcal 0-64 years Vaccine  Aged Out       Subjective:      Review of Systems   Constitutional: Positive for fever. HENT: Positive for congestion. All other systems reviewed and are negative. Objective:     Physical Exam  Vitals signs reviewed. Constitutional:       Appearance: Normal appearance.    HENT:      Head: Normocephalic and atraumatic. Skin:     General: Skin is warm and dry. Neurological:      General: No focal deficit present. Mental Status: He is alert and oriented to person, place, and time. Psychiatric:         Mood and Affect: Mood normal.         Behavior: Behavior normal.       /72 (Site: Right Upper Arm, Position: Sitting, Cuff Size: Medium Adult)   Pulse 86   Temp 98.7 °F (37.1 °C) (Oral)   Ht 6' (1.829 m)   Wt 185 lb (83.9 kg)   SpO2 97%   BMI 25.09 kg/m²       Assessment:       Diagnosis Orders   1. Flu-like symptoms  COVID-19 Ambulatory       Plan:      No follow-ups on file. No orders of the defined types were placed in this encounter. Orders Placed This Encounter   Procedures    COVID-19 Ambulatory     Standing Status:   Future     Standing Expiration Date:   11/27/2021     Scheduling Instructions:      Saline media preferred given current shortage of viral transport media but both acceptable     Order Specific Question:   Is this test for diagnosis or screening? Answer:   Diagnosis of ill patient     Order Specific Question:   Symptomatic for COVID-19 as defined by CDC? Answer:   Yes     Order Specific Question:   Date of Symptom Onset     Answer:   11/24/2020     Order Specific Question:   Hospitalized for COVID-19? Answer:   No     Order Specific Question:   Admitted to ICU for COVID-19? Answer:   No     Order Specific Question:   Employed in healthcare setting? Answer:   No     Order Specific Question:   Resident in a congregate (group) care setting? Answer:   No     Order Specific Question:   Pregnant: Answer:   No            Patient given educational materials - see patient instructions. Discussed use, benefit, and side effects of prescribed medications. All patientquestions answered. Pt voiced understanding.     Electronically signed by Deric Smith MD on 11/27/2020at 2:51 PM

## 2020-11-30 LAB — SARS-COV-2, NAA: NOT DETECTED

## 2021-04-29 ENCOUNTER — HOSPITAL ENCOUNTER (EMERGENCY)
Age: 51
Discharge: HOME | End: 2021-04-30
Attending: EMERGENCY MEDICINE
Payer: COMMERCIAL

## 2021-04-29 VITALS
SYSTOLIC BLOOD PRESSURE: 127 MMHG | DIASTOLIC BLOOD PRESSURE: 71 MMHG | WEIGHT: 185 LBS | RESPIRATION RATE: 18 BRPM | BODY MASS INDEX: 25.09 KG/M2 | OXYGEN SATURATION: 95 % | TEMPERATURE: 98 F | HEART RATE: 87 BPM
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
## 2021-04-29 PROCEDURE — 99283 EMERGENCY DEPT VISIT LOW MDM: CPT

## 2021-04-29 PROCEDURE — 6370000000 HC RX 637 (ALT 250 FOR IP): Performed by: STUDENT IN AN ORGANIZED HEALTH CARE EDUCATION/TRAINING PROGRAM

## 2021-04-29 RX ORDER — DOXYCYCLINE HYCLATE 100 MG
200 TABLET ORAL ONCE
Status: COMPLETED | OUTPATIENT
Start: 2021-04-29 | End: 2021-04-29

## 2021-04-29 RX ADMIN — DOXYCYCLINE HYCLATE 200 MG: 100 TABLET ORAL at 23:47

## 2021-04-30 ASSESSMENT — ENCOUNTER SYMPTOMS
EYE DISCHARGE: 0
EYE REDNESS: 0
ABDOMINAL PAIN: 0
SHORTNESS OF BREATH: 0
COLOR CHANGE: 0

## 2021-04-30 NOTE — ED PROVIDER NOTES
[address] [facility]     Emergency Department     Faculty Attestation    I performed a history and physical examination of the patient and discussed management with the resident. I reviewed the residents note and agree with the documented findings including all diagnostic interpretations and plan of care. Any areas of disagreement are noted on the chart. I was personally present for the key portions of any procedures. I have documented in the chart those procedures where I was not present during the key portions. I have reviewed the emergency nurses triage note. I agree with the chief complaint, past medical history, past surgical history, allergies, medications, social and family history as documented unless otherwise noted below. Documentation of the HPI, Physical Exam and Medical Decision Making performed by scribes is based on my personal performance of the HPI, PE and MDM. For Physician Assistant/ Nurse Practitioner cases/documentation I have personally evaluated this patient and have completed at least one if not all key elements of the E/M (history, physical exam, and MDM). Additional findings are as noted. This patient was evaluated in the Emergency Department for symptoms described in the history of present illness. He/she was evaluated in the context of the global COVID-19 pandemic, which necessitated consideration that the patient might be at risk for infection with the SARS-CoV-2 virus that causes COVID-19. Institutional protocols and algorithms that pertain to the evaluation of patients at risk for COVID-19 are in a state of rapid change based on information released by regulatory bodies including the CDC and federal and state organizations. These policies and algorithms were followed during the patient's care in the ED. Primary Care Physician: Chun Chaudhary MD    History:  This is a 52 y.o. male who presents to the Emergency Department with complaint of embedded tick in the scalp. Uncertain how long its been. He suspects it may be several hours. Denies any rash. Denies any other bites or ticks that he noticed. Physical:     weight is 185 lb (83.9 kg). His tympanic temperature is 98 °F (36.7 °C). His blood pressure is 127/71 and his pulse is 87. His respiration is 18 and oxygen saturation is 95%. 52 y.o. male no acute distress. Patient has tick embedded in the scalp near the occiput no obvious rash otherwise    Impression: Embedded tick    Plan: Tick was extracted using rat-tooth forceps by resident under my direct supervision. On careful examination of the tick the entire tick is intact with no residual pieces remaining.   As we are unclear on the timeline will give single dose doxycycline as prophylaxis for Lyme      Ivana Cano MD, Damien Loss  Attending Emergency Physician         Fatemeh Skinner MD  04/30/21 0040

## 2021-04-30 NOTE — ED NOTES
Pt reports his wife found a tick on his posterior scalp, tick noted on right lower posterior scalp, Pt A&Ox4, RR even/unlabored, NAD noted.  Will cont to monitor      Casimiro Kilpatrick RN  04/29/21 5564

## 2021-05-01 NOTE — ED PROVIDER NOTES
[facility] ED  Emergency Department Encounter  Emergency Medicine Resident     Pt Name: Juancho Mak  MRN: [id number]  [place] 1970  Date of evaluation: 4/30/21  PCP:  Rosie Bazzi MD    [address] [place] [address]       Chief Complaint   Patient presents with    Other     tic on head        HISTORY OFPRESENT ILLNESS  (Location/Symptom, Timing/Onset, Context/Setting, Quality, Duration, Modifying Factors,Severity.)      Juancho Mak is a 52 y.o. male who presents with concern for tick to right posterior scalp. Unsure where he would have gotten a tick from that spent any time outdoors. States he was given a welding helmet by a friend. Felt a bug crawling on his scalp. His wife looked at his neck and saw and tick engorged. Approximately 1 to 2 hours ago. PAST MEDICAL / SURGICAL / SOCIAL / FAMILY HISTORY      has a past medical history of Seizure disorder, grand mal ([address] has a past surgical history that includes Hand tendon surgery (Left, 11/13/2019) and Hand tendon surgery (Left, 12/20/2019).     Social History     Socioeconomic History    Marital status:      Spouse name: Not on file    Number of children: Not on file    Years of education: Not on file    Highest education level: Not on file   Occupational History    Not on file   Social Needs    Financial resource strain: Not on file    Food insecurity     Worry: Not on file     Inability: Not on file    Transportation needs     Medical: Not on file     Non-medical: Not on file   Tobacco Use    Smoking status: Former Smoker    Smokeless tobacco: Never Used   Substance and Sexual Activity    Alcohol use: Never     Frequency: Never    Drug use: Not Currently     Types: Marijuana    Sexual activity: Not on file   Lifestyle    Physical activity     Days per week: Not on file     Minutes per session: Not on file    Stress: Not on file   Relationships    Social connections     Talks on phone: Not on file     Gets together: Not on file     Attends Advent service: Not on file     Active member of club or organization: Not on file     Attends meetings of clubs or organizations: Not on file     Relationship status: Not on file    Intimate partner violence     Fear of current or ex partner: Not on file     Emotionally abused: Not on file     Physically abused: Not on file     Forced sexual activity: Not on file   Other Topics Concern    Not on file   Social History Narrative    Not on file       Family History   Problem Relation Age of Onset    Coronary Art Dis Father     Heart Attack Father        Allergies:  Nuts [peanut-containing drug products] and No known allergies    Home Medications:  Prior to Admission medications    Medication Sig Start Date End Date Taking? Authorizing Provider   phenytoin (DILANTIN) 100 MG ER capsule Take 100 mg by mouth 2 times daily Indications: History of Seizures 200mg at 2pm, 400mg at 2am    Historical Provider, MD   lamoTRIgine (LAMICTAL) 100 MG tablet Take 200 mg by mouth daily Indications: History of Seizures 700mg at 2pm, 800mg at 2am    Historical Provider, MD       REVIEW OF SYSTEMS    (2-9 systems for level 4, 10 or more for level 5)      Review of Systems   Constitutional: Negative for chills and fever. Eyes: Negative for discharge and redness. Respiratory: Negative for shortness of breath. Cardiovascular: Negative for chest pain. Gastrointestinal: Negative for abdominal pain. Genitourinary: Negative for dysuria. Musculoskeletal: Negative for arthralgias. Skin: Negative for color change and rash. Allergic/Immunologic: Negative for environmental allergies. Neurological: Negative for headaches. Psychiatric/Behavioral: Negative for agitation and confusion. PHYSICAL EXAM   (up to 7 for level 4, 8 or more for level 5)     INITIAL VITALS:    weight is 185 lb (83.9 kg). His tympanic temperature is 98 °F (36.7 °C).  His blood pressure is 127/71 and his pulse is 87. His respiration is 18 and oxygen saturation is 95%. Physical Exam  Vitals signs and nursing note reviewed. Constitutional:       Appearance: He is well-developed. HENT:      Head: Normocephalic and atraumatic. Eyes:      General: No scleral icterus. Conjunctiva/sclera: Conjunctivae normal.      Pupils: Pupils are equal, round, and reactive to light. Cardiovascular:      Rate and Rhythm: Normal rate and regular rhythm. Heart sounds: Normal heart sounds. No murmur. No friction rub. No gallop. Pulmonary:      Effort: Pulmonary effort is normal. No respiratory distress. Breath sounds: Normal breath sounds. No wheezing or rales. Musculoskeletal: Normal range of motion. Skin:     General: Skin is warm and dry. Findings: No erythema or rash. Comments: Small engorged tick to right posterior hairline, no surrounding rash erythema   Neurological:      Mental Status: He is alert and oriented to person, place, and time. Psychiatric:         Behavior: Behavior normal.         DIFFERENTIAL  DIAGNOSIS     PLAN (LABS / IMAGING / EKG):  No orders of the defined types were placed in this encounter. MEDICATIONS ORDERED:  Orders Placed This Encounter   Medications    doxycycline hyclate (VIBRA-TABS) tablet 200 mg     Order Specific Question:   Antimicrobial Indications     Answer: Other     Order Specific Question:   Other Abx Indication     Answer:   Tic     Order Specific Question:   Suspected Organism(s)     Answer:   Borrelia       DDX: Wood tick versus deer tick    Initial MDM/Plan: 52 y.o. male who presents with tick to right posterior scalp. Tick removed in the manner described below. Removed in entirety. Will give prophylactic dose of doxycycline however does appear to be wood tick. Encourage patient monitor for rash or fevers. Return for any worsening signs or symptoms otherwise follow-up with primary care doctor.     DIAGNOSTIC RESULTS / EMERGENCY DEPARTMENT COURSE / [facility]     LABS:  Labs Reviewed - No data to display      RADIOLOGY:  No results found. EMERGENCY DEPARTMENT COURSE:  · Based on the low acuity of concerning symptoms and improvement of symptoms, patient will be discharged with follow up and prescription information listed in the Disposition section. · Patient states they will follow-up with primary care physician and/or return to the emergency department should they experience a change or worsening of symptoms. · Patient will be discharged with resources: summary of visit, instructions, follow-up information, prescriptions if necessary and clinics available. · Patient/ family instructed to read discharge paperwork. All of their questions and concerns were addressed. · Suspicion for any acute life-threatening processes is low. Patient voices understanding of plan. PROCEDURES:  PROCEDURE NOTE - FOREIGN BODY REMOVAL    PATIENT NAME: Aj Carbone  MEDICAL RECORD NO. [id number]  DATE: 4/30/2021  ATTENDING PHYSICIAN: Dr. China Rodriguez DIAGNOSIS:  Tick removal  POSTOPERATIVE DIAGNOSIS:  Same  PROCEDURE PERFORMED:   Foreign Body Removal  PERFORMING PHYSICIAN: Ghassan Murphy DO      DISCUSSION:  Aj Carbone is a 52y.o.-year-old male who requires foreign body removal.  The history and physical examination were reviewed and confirmed. CONSENT: The patient provided verbal consent for this procedure. PROCEDURE: Tick was removed with tissue forceps from the head. Appearing entirely intact. No visualized rash targetoid lesion. The patient tolerated the procedure well. COMPLICATIONS:  None     Ghassan Murphy DO  9:12 PM, 4/30/21      CONSULTS:  None    CRITICAL CARE:  Please see attending note    FINAL IMPRESSION      1.  Tick bite, initial encounter          DISPOSITION / PLAN     DISPOSITION Decision To Discharge 04/29/2021 11:59:23 PM        PATIENTREFERRED TO:  Daivd Moreira MD  [facility] [address]  [phone]    Schedule an appointment as soon as possible for a visit in 1 week  For wound re-check      DISCHARGE MEDICATIONS:  Discharge Medication List as of 4/30/2021 12:17 AM          Praveen Ramsay DO  EmergencyMedicine Resident    (Please note that portions of this note were completed with a voice recognition program.  Efforts were made to edit the dictations but occasionally words are mis-transcribed.)       Praveen Ramsay DO  Resident  04/30/21 [id number]

## 2022-04-11 ENCOUNTER — APPOINTMENT (OUTPATIENT)
Dept: CT IMAGING | Age: 52
DRG: 101 | End: 2022-04-11
Payer: COMMERCIAL

## 2022-04-11 ENCOUNTER — HOSPITAL ENCOUNTER (INPATIENT)
Age: 52
LOS: 1 days | Discharge: HOME | DRG: 101 | End: 2022-04-12
Attending: EMERGENCY MEDICINE | Admitting: SURGERY
Payer: COMMERCIAL

## 2022-04-11 VITALS
OXYGEN SATURATION: 94 % | RESPIRATION RATE: 16 BRPM | BODY MASS INDEX: 25.06 KG/M2 | HEART RATE: 90 BPM | TEMPERATURE: 97.9 F | HEIGHT: 72 IN | DIASTOLIC BLOOD PRESSURE: 74 MMHG | SYSTOLIC BLOOD PRESSURE: 125 MMHG | WEIGHT: 185 LBS

## 2022-04-11 DIAGNOSIS — R56.9 SEIZURE (HCC): Primary | ICD-10-CM
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
## 2022-04-11 LAB
ABO/RH: NORMAL
ANION GAP SERPL CALCULATED.3IONS-SCNC: 18 MMOL/L (ref 9–17)
ANTIBODY SCREEN: NEGATIVE
ARM BAND NUMBER: NORMAL
BLOOD BANK SPECIMEN: ABNORMAL
BUN BLDV-MCNC: 15 MG/DL (ref 6–20)
CARBOXYHEMOGLOBIN: 2.8 % (ref 0–5)
CHLORIDE BLD-SCNC: 99 MMOL/L (ref 98–107)
CO2: 20 MMOL/L (ref 20–31)
CREAT SERPL-MCNC: 0.83 MG/DL (ref 0.7–1.2)
ETHANOL PERCENT: <0.01 %
ETHANOL: <10 MG/DL
EXPIRATION DATE: NORMAL
FIO2: ABNORMAL
GLUCOSE BLD-MCNC: 186 MG/DL (ref 70–99)
HCG QUALITATIVE: ABNORMAL
HCO3 VENOUS: 20 MMOL/L (ref 24–30)
HCT VFR BLD CALC: 44.2 % (ref 40.7–50.3)
HEMOGLOBIN: 14.6 G/DL (ref 13–17)
INR BLD: ABNORMAL
LAMOTRIGINE LEVEL: 4.6 UG/ML (ref 3–15)
MCH RBC QN AUTO: 31.4 PG (ref 25.2–33.5)
MCHC RBC AUTO-ENTMCNC: 33 G/DL (ref 28.4–34.8)
MCV RBC AUTO: 95.1 FL (ref 82.6–102.9)
NEGATIVE BASE EXCESS, VEN: 4.9 MMOL/L (ref 0–2)
NRBC AUTOMATED: 0 PER 100 WBC
O2 SAT, VEN: 93.2 % (ref 60–85)
PARTIAL THROMBOPLASTIN TIME: ABNORMAL SEC
PATIENT TEMP: 37
PCO2, VEN: 38.8 (ref 39–55)
PDW BLD-RTO: 13.2 % (ref 11.8–14.4)
PH VENOUS: 7.33 (ref 7.32–7.42)
PHENYTOIN LEVEL: 5.8 UG/ML (ref 10–20)
PLATELET # BLD: 456 K/UL (ref 138–453)
PMV BLD AUTO: 8.5 FL (ref 8.1–13.5)
PO2, VEN: 65.9 (ref 30–50)
POTASSIUM SERPL-SCNC: 3.4 MMOL/L (ref 3.7–5.3)
PROTHROMBIN TIME: ABNORMAL SEC
RBC # BLD: 4.65 M/UL (ref 4.21–5.77)
SARS-COV-2, RAPID: NOT DETECTED
SODIUM BLD-SCNC: 137 MMOL/L (ref 135–144)
SPECIMEN DESCRIPTION: NORMAL
WBC # BLD: 9.5 K/UL (ref 3.5–11.3)

## 2022-04-11 PROCEDURE — 80186 ASSAY OF PHENYTOIN FREE: CPT

## 2022-04-11 PROCEDURE — 82805 BLOOD GASES W/O2 SATURATION: CPT

## 2022-04-11 PROCEDURE — 80051 ELECTROLYTE PANEL: CPT

## 2022-04-11 PROCEDURE — 99284 EMERGENCY DEPT VISIT MOD MDM: CPT

## 2022-04-11 PROCEDURE — 36415 COLL VENOUS BLD VENIPUNCTURE: CPT

## 2022-04-11 PROCEDURE — 71260 CT THORAX DX C+: CPT

## 2022-04-11 PROCEDURE — 86901 BLOOD TYPING SEROLOGIC RH(D): CPT

## 2022-04-11 PROCEDURE — 87635 SARS-COV-2 COVID-19 AMP PRB: CPT

## 2022-04-11 PROCEDURE — 84520 ASSAY OF UREA NITROGEN: CPT

## 2022-04-11 PROCEDURE — 3209999900 CT THORACIC SPINE TRAUMA RECONSTRUCTION

## 2022-04-11 PROCEDURE — 82565 ASSAY OF CREATININE: CPT

## 2022-04-11 PROCEDURE — 3209999900 CT LUMBAR SPINE TRAUMA RECONSTRUCTION

## 2022-04-11 PROCEDURE — 70450 CT HEAD/BRAIN W/O DYE: CPT

## 2022-04-11 PROCEDURE — 86900 BLOOD TYPING SEROLOGIC ABO: CPT

## 2022-04-11 PROCEDURE — 84703 CHORIONIC GONADOTROPIN ASSAY: CPT

## 2022-04-11 PROCEDURE — G0480 DRUG TEST DEF 1-7 CLASSES: HCPCS

## 2022-04-11 PROCEDURE — 72125 CT NECK SPINE W/O DYE: CPT

## 2022-04-11 PROCEDURE — 80185 ASSAY OF PHENYTOIN TOTAL: CPT

## 2022-04-11 PROCEDURE — 85027 COMPLETE CBC AUTOMATED: CPT

## 2022-04-11 PROCEDURE — 85730 THROMBOPLASTIN TIME PARTIAL: CPT

## 2022-04-11 PROCEDURE — 82947 ASSAY GLUCOSE BLOOD QUANT: CPT

## 2022-04-11 PROCEDURE — 80175 DRUG SCREEN QUAN LAMOTRIGINE: CPT

## 2022-04-11 PROCEDURE — 1200000000 HC SEMI PRIVATE

## 2022-04-11 PROCEDURE — 86850 RBC ANTIBODY SCREEN: CPT

## 2022-04-11 PROCEDURE — 6360000004 HC RX CONTRAST MEDICATION: Performed by: STUDENT IN AN ORGANIZED HEALTH CARE EDUCATION/TRAINING PROGRAM

## 2022-04-11 PROCEDURE — 85610 PROTHROMBIN TIME: CPT

## 2022-04-11 RX ORDER — OXYCODONE HYDROCHLORIDE 5 MG/1
5 TABLET ORAL EVERY 6 HOURS PRN
Status: DISCONTINUED | OUTPATIENT
Start: 2022-04-11 | End: 2022-04-12

## 2022-04-11 RX ORDER — POLYETHYLENE GLYCOL 3350 17 G/17G
17 POWDER, FOR SOLUTION ORAL DAILY
Status: DISCONTINUED | OUTPATIENT
Start: 2022-04-12 | End: 2022-04-12 | Stop reason: HOSPADM

## 2022-04-11 RX ORDER — SODIUM CHLORIDE 9 MG/ML
INJECTION, SOLUTION INTRAVENOUS PRN
Status: DISCONTINUED | OUTPATIENT
Start: 2022-04-11 | End: 2022-04-12

## 2022-04-11 RX ORDER — SODIUM CHLORIDE, SODIUM LACTATE, POTASSIUM CHLORIDE, CALCIUM CHLORIDE 600; 310; 30; 20 MG/100ML; MG/100ML; MG/100ML; MG/100ML
INJECTION, SOLUTION INTRAVENOUS CONTINUOUS
Status: DISCONTINUED | OUTPATIENT
Start: 2022-04-11 | End: 2022-04-12

## 2022-04-11 RX ORDER — ACETAMINOPHEN 500 MG
1000 TABLET ORAL EVERY 8 HOURS SCHEDULED
Status: DISCONTINUED | OUTPATIENT
Start: 2022-04-11 | End: 2022-04-12

## 2022-04-11 RX ORDER — LAMOTRIGINE 100 MG/1
TABLET ORAL DAILY
COMMUNITY

## 2022-04-11 RX ORDER — SODIUM CHLORIDE 0.9 % (FLUSH) 0.9 %
5-40 SYRINGE (ML) INJECTION EVERY 12 HOURS SCHEDULED
Status: DISCONTINUED | OUTPATIENT
Start: 2022-04-11 | End: 2022-04-12

## 2022-04-11 RX ORDER — PHENYTOIN SODIUM 100 MG/1
100 CAPSULE, EXTENDED RELEASE ORAL 2 TIMES DAILY
Status: DISCONTINUED | OUTPATIENT
Start: 2022-04-11 | End: 2022-04-12

## 2022-04-11 RX ORDER — BISACODYL 10 MG
10 SUPPOSITORY, RECTAL RECTAL DAILY PRN
Status: DISCONTINUED | OUTPATIENT
Start: 2022-04-11 | End: 2022-04-12

## 2022-04-11 RX ORDER — ONDANSETRON 4 MG/1
4 TABLET, ORALLY DISINTEGRATING ORAL EVERY 8 HOURS PRN
Status: DISCONTINUED | OUTPATIENT
Start: 2022-04-11 | End: 2022-04-12

## 2022-04-11 RX ORDER — METHOCARBAMOL 750 MG/1
750 TABLET, FILM COATED ORAL 3 TIMES DAILY
Status: DISCONTINUED | OUTPATIENT
Start: 2022-04-11 | End: 2022-04-12

## 2022-04-11 RX ORDER — ONDANSETRON 2 MG/ML
4 INJECTION INTRAMUSCULAR; INTRAVENOUS EVERY 6 HOURS PRN
Status: DISCONTINUED | OUTPATIENT
Start: 2022-04-11 | End: 2022-04-12

## 2022-04-11 RX ORDER — LAMOTRIGINE 100 MG/1
100 TABLET ORAL DAILY
Status: DISCONTINUED | OUTPATIENT
Start: 2022-04-12 | End: 2022-04-12

## 2022-04-11 RX ORDER — PHENYTOIN SODIUM 100 MG/1
CAPSULE, EXTENDED RELEASE ORAL 2 TIMES DAILY
COMMUNITY

## 2022-04-11 RX ORDER — SODIUM CHLORIDE 0.9 % (FLUSH) 0.9 %
5-40 SYRINGE (ML) INJECTION PRN
Status: DISCONTINUED | OUTPATIENT
Start: 2022-04-11 | End: 2022-04-12 | Stop reason: HOSPADM

## 2022-04-11 RX ADMIN — IOPAMIDOL 130 ML: 755 INJECTION, SOLUTION INTRAVENOUS at 17:05

## 2022-04-11 ASSESSMENT — ENCOUNTER SYMPTOMS
CONSTIPATION: 0
NAUSEA: 0
SORE THROAT: 0
ABDOMINAL PAIN: 0
VOMITING: 0
COUGH: 0
SHORTNESS OF BREATH: 0
BLOOD IN STOOL: 0
RHINORRHEA: 0
DIARRHEA: 0

## 2022-04-11 ASSESSMENT — PAIN SCALES - GENERAL: PAINLEVEL_OUTOF10: 0

## 2022-04-11 NOTE — ED TRIAGE NOTES
Pt to ED via [address] [place] [address] EMS after being found down outside unconscious. Pt has a seizure hx but no bystanders to witness seizure.    Pt combative and confused for first responders, A&Ox4 in ED on arrival.

## 2022-04-11 NOTE — CONSULTS
[facility] Neurology   IN-PATIENT SERVICE      NEUROLOGY CONSULT  NOTE            Date:   4/11/2022  Patient name:  Carmelo Newell  Date of admission:  4/11/2022  YOB: 1970      Chief Complaint:     Chief Complaint   Patient presents with    Loss of Consciousness     found on ground unconscious       Reason for Consult:      Seizures    History of Present Illness: The patient is a 54 y.o. male brought by EMS as a trauma priority, patient reportedly was found down outside, patient has known seizure disorder, and the EMS patient was found to be confused, not able to say what occurred, no witnessed for any seizure, and arrival to the ED patient was oriented and alert, patient says that he feels he had a seizure, patient usually takes Lamictal and Dilantin for seizure and he is positive for being compliant,  CT head: No acute intracranial abnormality no evidence of hemorrhage, infarction or mass,    Care everywhere patient is known to have complex partial seizure with secondary generalization, current medication: Lamictal 750 in the morning and 800 in the evening, Dilantin 200 mg in the morning and 400 mg he in the evening,    Past Medical History:     Past Medical History:   Diagnosis Date    Seizures ([address]         Past Surgical History:     History reviewed. No pertinent surgical history. Medications Prior to Admission:     Prior to Admission medications    Medication Sig Start Date End Date Taking? Authorizing Provider   lamoTRIgine (LAMICTAL) 100 MG tablet Take by mouth daily Unsure of doses   Yes Historical Provider, MD   phenytoin (DILANTIN) 100 MG ER capsule Take by mouth 2 times daily Unsure of doses   Yes Historical Provider, MD        Allergies:     Patient has no known allergies. Social History:     Tobacco:    reports that he has never smoked. He has never used smokeless tobacco.  Alcohol:      reports previous alcohol use. Drug Use:  reports previous drug use.     Family History:     History reviewed. No pertinent family history. Review of Systems:       Constitutional Negative for fever and chills   HEENT Negative for ear discharge, ear pain, nosebleed   Eyes Negative for photophobia, pain and discharge   Respiratory Negative for hemoptysis and sputum   Cardiovascular Negative for orthopnea, claudication and PND   Gastrointestinal Negative for abdominal pain, diarrhea, blood in stool   Musculoskeletal Negative for joint pain, negative for myalgia   Skin Negative for rash or itching   hematology Negative for ecchymosis, anemia   Psychiatric Negative for suicidal ideation, anxiety, depression, hallucinations       Physical Exam:   /80   Pulse 91   Temp 98.6 °F (37 °C) (Oral)   Resp 16   Ht 6' (1.829 m)   Wt 185 lb (83.9 kg)   SpO2 95%   BMI 25.09 kg/m²   Temp (24hrs), Av.6 °F (37 °C), Min:98.6 °F (37 °C), Max:98.6 °F (37 °C)        General examination:      General Appearance:  alert, well appearing, and in no acute distress  HEENT: Normocephalic, atraumatic, moist mucus membranes  Neck: supple, no carotid bruits, (-) nuchal rigidity  Lungs:  Respirations unlabored, chest wall no deformity, BS normal  Cardiovascular: normal rate, regular rhythm  Abdomen: Soft, nontender, nondistended, normal bowel sounds  Skin: No gross lesions, rashes, bruising or bleeding on exposed skin area  Extremities:  peripheral pulses palpable, no cyanosis, clubbing or edema  Psych: normal affect      Neurological examination:      Mental status   Alert and oriented x 3; following all commands;   speech is fluent, no dysarthria, aphasia.       Cranial nerves   II - visual fields intact to confrontation; pupils reactive  III, IV, VI - extraocular muscles intact; no POOJA; no nystagmus; no ptosis   V - normal facial sensation                                                               VII - normal facial symmetry                                                             VIII - intact hearing                                                                             IX, X - symmetrical palate elevation                                               XI - symmetrical shoulder shrug                                                       XII - midline tongue without atrophy or fasciculation     Motor function  Strength:   5/5 RUE, 5/5 RLE  5/5 LUE, 5/5  LLE  Normal bulk and tone. Sensory function Intact to touch, pin, vibration, proprioception throughout     Cerebellar Intact finger-nose-finger testing. Intact heel-shin testing. No dysdiadochokinesia present. No tremors                        Reflex function 2/4 symmetric throughout . Downgoing plantar response bilaterally. (-)Boyd's sign bilaterally      Gait                  Not assessed          Diagnostics:      Laboratory Testing:  CBC:   Recent Labs     04/11/22  1644   WBC 9.5   HGB 14.6   *     BMP:    Recent Labs     04/11/22  1644      K 3.4*   CL 99   CO2 20   BUN 15   CREATININE 0.83   GLUCOSE 186*         Lab Results   Component Value Date    INR  04/11/2022     Unable to perform testing: Specimen quantity not sufficient. No results found for: PHENYTOIN, PHENYTOIN, VALPROATE, CBMZ      Imaging/Diagnostics:  CT HEAD WO CONTRAST    Result Date: 4/11/2022  EXAMINATION: CT OF THE HEAD WITHOUT CONTRAST; CT OF THE CERVICAL SPINE WITHOUT CONTRAST; CT OF THE LUMBAR SPINE WITHOUT CONTRAST; CT OF THE CHEST, ABDOMEN, AND PELVIS WITH CONTRAST; CT OF THE THORACIC SPINE WITHOUT CONTRAST  4/11/2022 5:02 pm TECHNIQUE: CT of the head was performed without the administration of intravenous contrast. Dose modulation, iterative reconstruction, and/or weight based adjustment of the mA/kV was utilized to reduce the radiation dose to as low as reasonably achievable.; CT of the cervical spine was performed without the administration of intravenous contrast. Multiplanar reformatted images are provided for review.  Dose modulation, iterative reconstruction, and/or weight based adjustment of the mA/kV was utilized to reduce the radiation dose to as low as reasonably achievable.; CT of the lumbar spine was performed without the administration of intravenous contrast. Multiplanar reformatted images are provided for review. Adjustment of mA and/or kV according to patient size was utilized. Dose modulation, iterative reconstruction, and/or weight based adjustment of the mA/kV was utilized to reduce the radiation dose to as low as reasonably achievable.; CT of the chest, abdomen and pelvis was performed with the administration of intravenous contrast. Multiplanar reformatted images are provided for review. Dose modulation, iterative reconstruction, and/or weight based adjustment of the mA/kV was utilized to reduce the radiation dose to as low as reasonably achievable.; CT of the thoracic spine was performed without the administration of intravenous contrast. Multiplanar reformatted images are provided for review. Dose modulation, iterative reconstruction, and/or weight based adjustment of the mA/kV was utilized to reduce the radiation dose to as low as reasonably achievable. COMPARISON: None. HISTORY: ORDERING SYSTEM PROVIDED HISTORY: trauma TECHNOLOGIST PROVIDED HISTORY: trauma FINDINGS: Head CT: There is calcification within the cortex and part of subcortical white matter within the left temporoparietal region. Finding is likely sequela to an old insult. There is no acute infarction, intracranial hemorrhage or intracranial mass lesion. No mass effect, midline shift or extra-axial collection is noted. The brain parenchyma is otherwise normal.  The cerebellar tonsils are in normal position. The ventricles, sulci, and cisterns are within normal limits in size and configuration. The globes and orbits are within normal limits. The visualized extracranial structures including paranasal sinuses and mastoid air cells are unremarkable.  No fracture is identified. Cervical: BONES/ALIGNMENT: There is no acute fracture or traumatic malalignment. DEGENERATIVE CHANGES: Mild disc and facet degenerative disease at C6-C7. SOFT TISSUES: There is no prevertebral soft tissue swelling. Thoracic: BONES/ALIGNMENT: Age indeterminate superior endplate compression fracture of T5 with 10-15% height loss. Age indeterminate inferior endplate compression fracture of T6 with 10-15% height loss. Age indeterminate superior endplate compression fracture of T7 with 10-15% height loss. Chronic appearing compression fracture of T8-T9, T10 and T11 and T12 with 10% height loss. . DEGENERATIVE CHANGES: No high-grade canal or foraminal stenosis. SOFT TISSUES: There is no prevertebral soft tissue swelling. Lumbar: BONES/ALIGNMENT: There is no acute fracture or traumatic malalignment. DEGENERATIVE CHANGES: Moderate disc and facet degenerative disease at L3-L4 L4-L5 and L5-S1. At L3-L4, moderate canal stenosis and moderate bilateral neural foraminal narrowing. SOFT TISSUES: There is no prevertebral soft tissue swelling. CT chest: Lines and tubes:  None. Lungs and Airways and Pleura:  Central airways are patent. No lung consolidation. No pleural effusion. No pneumothorax. Lymph nodes: No pathologically enlarged mediastinal, hilar, lower cervical, or chest wall lymph nodes. Cardiovascular and Mediastinum: No acute aortic pathology. Cardiac chamber sizes appear to measure within normal limits on this non ECG gated study. No pericardial effusion. The thyroid gland is unremarkable. The esophagus is unremarkable. Bones/Soft tissues: For detailed description of thoracic spine please refer to the thoracic spine CT report . No definite suspicious lytic or blastic foci. CT abdomen and pelvis: Organs: The liver, spleen, adrenal glands, gallbladder, pancreas, kidneys and ureters and pelvic organs including the urinary bladder appear unremarkable.  Peritoneum / Retroperitoneum:  No free air or free fluid is noted. No pathologically enlarged lymphadenopathy. The vasculature do not demonstrate acute abnormality. GI Tract:  No distention or wall thickening. Bones and Soft Tissues: No fracture. No concerning lytic or sclerotic lesions are noted. Head CT: No acute intracranial abnormality. No evidence for acute intracranial hemorrhage, territorial infarction or intracranial mass lesion. Calcification of cortex and subcortical white matter of left temporoparietal region is likely sequela to old insult. 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 Cervical CT: No acute osseous abnormality of the cervical spine. No fracture. Thoracic CT: Age indeterminate superior endplate compression fracture of T5 with 10-15% height loss. Age indeterminate inferior endplate compression fracture of T6 with 10-15% height loss. Age indeterminate superior endplate compression fracture of T7 with 10-15% height loss. For further evaluation thoracic spine MRI can be obtained. Chronic appearing compression fracture of T8-T9, T10 and T11 and T12 with 10% height loss. . Lumbar spine CT: No acute osseous abnormality of lumbar spine. No fracture. CT of the chest abdomen and pelvis: No acute traumatic injury within the chest abdomen and pelvis.   For detailed description of thoracic spine please refer to the thoracic spine CT report     CT CERVICAL SPINE WO CONTRAST    Result Date: 4/11/2022  EXAMINATION: CT OF THE HEAD WITHOUT CONTRAST; CT OF THE CERVICAL SPINE WITHOUT CONTRAST; CT OF THE LUMBAR SPINE WITHOUT CONTRAST; CT OF THE CHEST, ABDOMEN, AND PELVIS WITH CONTRAST; CT OF THE THORACIC SPINE WITHOUT CONTRAST  4/11/2022 5:02 pm TECHNIQUE: CT of the head was performed without the administration of intravenous contrast. Dose modulation, iterative reconstruction, and/or weight based adjustment of the mA/kV was utilized to reduce the radiation dose to as low as reasonably achievable.; CT of the cervical spine was performed without the administration of intravenous contrast. Multiplanar reformatted images are provided for review. Dose modulation, iterative reconstruction, and/or weight based adjustment of the mA/kV was utilized to reduce the radiation dose to as low as reasonably achievable.; CT of the lumbar spine was performed without the administration of intravenous contrast. Multiplanar reformatted images are provided for review. Adjustment of mA and/or kV according to patient size was utilized. Dose modulation, iterative reconstruction, and/or weight based adjustment of the mA/kV was utilized to reduce the radiation dose to as low as reasonably achievable.; CT of the chest, abdomen and pelvis was performed with the administration of intravenous contrast. Multiplanar reformatted images are provided for review. Dose modulation, iterative reconstruction, and/or weight based adjustment of the mA/kV was utilized to reduce the radiation dose to as low as reasonably achievable.; CT of the thoracic spine was performed without the administration of intravenous contrast. Multiplanar reformatted images are provided for review. Dose modulation, iterative reconstruction, and/or weight based adjustment of the mA/kV was utilized to reduce the radiation dose to as low as reasonably achievable. COMPARISON: None. HISTORY: ORDERING SYSTEM PROVIDED HISTORY: trauma TECHNOLOGIST PROVIDED HISTORY: trauma FINDINGS: Head CT: There is calcification within the cortex and part of subcortical white matter within the left temporoparietal region. Finding is likely sequela to an old insult. There is no acute infarction, intracranial hemorrhage or intracranial mass lesion. No mass effect, midline shift or extra-axial collection is noted. The brain parenchyma is otherwise normal.  The cerebellar tonsils are in normal position. The ventricles, sulci, and cisterns are within normal limits in size and configuration.  The globes and orbits are within normal limits. The visualized extracranial structures including paranasal sinuses and mastoid air cells are unremarkable. No fracture is identified. Cervical: BONES/ALIGNMENT: There is no acute fracture or traumatic malalignment. DEGENERATIVE CHANGES: Mild disc and facet degenerative disease at C6-C7. SOFT TISSUES: There is no prevertebral soft tissue swelling. Thoracic: BONES/ALIGNMENT: Age indeterminate superior endplate compression fracture of T5 with 10-15% height loss. Age indeterminate inferior endplate compression fracture of T6 with 10-15% height loss. Age indeterminate superior endplate compression fracture of T7 with 10-15% height loss. Chronic appearing compression fracture of T8-T9, T10 and T11 and T12 with 10% height loss. . DEGENERATIVE CHANGES: No high-grade canal or foraminal stenosis. SOFT TISSUES: There is no prevertebral soft tissue swelling. Lumbar: BONES/ALIGNMENT: There is no acute fracture or traumatic malalignment. DEGENERATIVE CHANGES: Moderate disc and facet degenerative disease at L3-L4 L4-L5 and L5-S1. At L3-L4, moderate canal stenosis and moderate bilateral neural foraminal narrowing. SOFT TISSUES: There is no prevertebral soft tissue swelling. CT chest: Lines and tubes:  None. Lungs and Airways and Pleura:  Central airways are patent. No lung consolidation. No pleural effusion. No pneumothorax. Lymph nodes: No pathologically enlarged mediastinal, hilar, lower cervical, or chest wall lymph nodes. Cardiovascular and Mediastinum: No acute aortic pathology. Cardiac chamber sizes appear to measure within normal limits on this non ECG gated study. No pericardial effusion. The thyroid gland is unremarkable. The esophagus is unremarkable. Bones/Soft tissues: For detailed description of thoracic spine please refer to the thoracic spine CT report . No definite suspicious lytic or blastic foci. CT abdomen and pelvis: Organs:  The liver, spleen, adrenal glands, gallbladder, pancreas, kidneys and ureters and pelvic organs including the urinary bladder appear unremarkable. Peritoneum / Retroperitoneum:  No free air or free fluid is noted. No pathologically enlarged lymphadenopathy. The vasculature do not demonstrate acute abnormality. GI Tract:  No distention or wall thickening. Bones and Soft Tissues: No fracture. No concerning lytic or sclerotic lesions are noted. Head CT: No acute intracranial abnormality. No evidence for acute intracranial hemorrhage, territorial infarction or intracranial mass lesion. Calcification of cortex and subcortical white matter of left temporoparietal region is likely sequela to old insult. 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 Cervical CT: No acute osseous abnormality of the cervical spine. No fracture. Thoracic CT: Age indeterminate superior endplate compression fracture of T5 with 10-15% height loss. Age indeterminate inferior endplate compression fracture of T6 with 10-15% height loss. Age indeterminate superior endplate compression fracture of T7 with 10-15% height loss. For further evaluation thoracic spine MRI can be obtained. Chronic appearing compression fracture of T8-T9, T10 and T11 and T12 with 10% height loss. . Lumbar spine CT: No acute osseous abnormality of lumbar spine. No fracture. CT of the chest abdomen and pelvis: No acute traumatic injury within the chest abdomen and pelvis.   For detailed description of thoracic spine please refer to the thoracic spine CT report     CT CHEST ABDOMEN PELVIS W CONTRAST    Result Date: 4/11/2022  EXAMINATION: CT OF THE HEAD WITHOUT CONTRAST; CT OF THE CERVICAL SPINE WITHOUT CONTRAST; CT OF THE LUMBAR SPINE WITHOUT CONTRAST; CT OF THE CHEST, ABDOMEN, AND PELVIS WITH CONTRAST; CT OF THE THORACIC SPINE WITHOUT CONTRAST  4/11/2022 5:02 pm TECHNIQUE: CT of the head was performed without the administration of intravenous contrast. Dose modulation, iterative reconstruction, and/or weight based adjustment of the mA/kV was utilized to reduce the radiation dose to as low as reasonably achievable.; CT of the cervical spine was performed without the administration of intravenous contrast. Multiplanar reformatted images are provided for review. Dose modulation, iterative reconstruction, and/or weight based adjustment of the mA/kV was utilized to reduce the radiation dose to as low as reasonably achievable.; CT of the lumbar spine was performed without the administration of intravenous contrast. Multiplanar reformatted images are provided for review. Adjustment of mA and/or kV according to patient size was utilized. Dose modulation, iterative reconstruction, and/or weight based adjustment of the mA/kV was utilized to reduce the radiation dose to as low as reasonably achievable.; CT of the chest, abdomen and pelvis was performed with the administration of intravenous contrast. Multiplanar reformatted images are provided for review. Dose modulation, iterative reconstruction, and/or weight based adjustment of the mA/kV was utilized to reduce the radiation dose to as low as reasonably achievable.; CT of the thoracic spine was performed without the administration of intravenous contrast. Multiplanar reformatted images are provided for review. Dose modulation, iterative reconstruction, and/or weight based adjustment of the mA/kV was utilized to reduce the radiation dose to as low as reasonably achievable. COMPARISON: None. HISTORY: ORDERING SYSTEM PROVIDED HISTORY: trauma TECHNOLOGIST PROVIDED HISTORY: trauma FINDINGS: Head CT: There is calcification within the cortex and part of subcortical white matter within the left temporoparietal region. Finding is likely sequela to an old insult. There is no acute infarction, intracranial hemorrhage or intracranial mass lesion. No mass effect, midline shift or extra-axial collection is noted.  The brain parenchyma is otherwise normal.  The cerebellar tonsils are in normal position. The ventricles, sulci, and cisterns are within normal limits in size and configuration. The globes and orbits are within normal limits. The visualized extracranial structures including paranasal sinuses and mastoid air cells are unremarkable. No fracture is identified. Cervical: BONES/ALIGNMENT: There is no acute fracture or traumatic malalignment. DEGENERATIVE CHANGES: Mild disc and facet degenerative disease at C6-C7. SOFT TISSUES: There is no prevertebral soft tissue swelling. Thoracic: BONES/ALIGNMENT: Age indeterminate superior endplate compression fracture of T5 with 10-15% height loss. Age indeterminate inferior endplate compression fracture of T6 with 10-15% height loss. Age indeterminate superior endplate compression fracture of T7 with 10-15% height loss. Chronic appearing compression fracture of T8-T9, T10 and T11 and T12 with 10% height loss. . DEGENERATIVE CHANGES: No high-grade canal or foraminal stenosis. SOFT TISSUES: There is no prevertebral soft tissue swelling. Lumbar: BONES/ALIGNMENT: There is no acute fracture or traumatic malalignment. DEGENERATIVE CHANGES: Moderate disc and facet degenerative disease at L3-L4 L4-L5 and L5-S1. At L3-L4, moderate canal stenosis and moderate bilateral neural foraminal narrowing. SOFT TISSUES: There is no prevertebral soft tissue swelling. CT chest: Lines and tubes:  None. Lungs and Airways and Pleura:  Central airways are patent. No lung consolidation. No pleural effusion. No pneumothorax. Lymph nodes: No pathologically enlarged mediastinal, hilar, lower cervical, or chest wall lymph nodes. Cardiovascular and Mediastinum: No acute aortic pathology. Cardiac chamber sizes appear to measure within normal limits on this non ECG gated study. No pericardial effusion. The thyroid gland is unremarkable. The esophagus is unremarkable. Bones/Soft tissues:   For detailed description of thoracic spine please refer to the thoracic spine CT report . No definite suspicious lytic or blastic foci. CT abdomen and pelvis: Organs: The liver, spleen, adrenal glands, gallbladder, pancreas, kidneys and ureters and pelvic organs including the urinary bladder appear unremarkable. Peritoneum / Retroperitoneum:  No free air or free fluid is noted. No pathologically enlarged lymphadenopathy. The vasculature do not demonstrate acute abnormality. GI Tract:  No distention or wall thickening. Bones and Soft Tissues: No fracture. No concerning lytic or sclerotic lesions are noted. Head CT: No acute intracranial abnormality. No evidence for acute intracranial hemorrhage, territorial infarction or intracranial mass lesion. Calcification of cortex and subcortical white matter of left temporoparietal region is likely sequela to old insult. 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 Cervical CT: No acute osseous abnormality of the cervical spine. No fracture. Thoracic CT: Age indeterminate superior endplate compression fracture of T5 with 10-15% height loss. Age indeterminate inferior endplate compression fracture of T6 with 10-15% height loss. Age indeterminate superior endplate compression fracture of T7 with 10-15% height loss. For further evaluation thoracic spine MRI can be obtained. Chronic appearing compression fracture of T8-T9, T10 and T11 and T12 with 10% height loss. . Lumbar spine CT: No acute osseous abnormality of lumbar spine. No fracture. CT of the chest abdomen and pelvis: No acute traumatic injury within the chest abdomen and pelvis.   For detailed description of thoracic spine please refer to the thoracic spine CT report     CT LUMBAR SPINE TRAUMA RECONSTRUCTION    Result Date: 4/11/2022  EXAMINATION: CT OF THE HEAD WITHOUT CONTRAST; CT OF THE CERVICAL SPINE WITHOUT CONTRAST; CT OF THE LUMBAR SPINE WITHOUT CONTRAST; CT OF THE CHEST, ABDOMEN, AND PELVIS WITH CONTRAST; CT OF THE THORACIC SPINE WITHOUT CONTRAST  4/11/2022 5:02 pm TECHNIQUE: CT of the head was performed without the administration of intravenous contrast. Dose modulation, iterative reconstruction, and/or weight based adjustment of the mA/kV was utilized to reduce the radiation dose to as low as reasonably achievable.; CT of the cervical spine was performed without the administration of intravenous contrast. Multiplanar reformatted images are provided for review. Dose modulation, iterative reconstruction, and/or weight based adjustment of the mA/kV was utilized to reduce the radiation dose to as low as reasonably achievable.; CT of the lumbar spine was performed without the administration of intravenous contrast. Multiplanar reformatted images are provided for review. Adjustment of mA and/or kV according to patient size was utilized. Dose modulation, iterative reconstruction, and/or weight based adjustment of the mA/kV was utilized to reduce the radiation dose to as low as reasonably achievable.; CT of the chest, abdomen and pelvis was performed with the administration of intravenous contrast. Multiplanar reformatted images are provided for review. Dose modulation, iterative reconstruction, and/or weight based adjustment of the mA/kV was utilized to reduce the radiation dose to as low as reasonably achievable.; CT of the thoracic spine was performed without the administration of intravenous contrast. Multiplanar reformatted images are provided for review. Dose modulation, iterative reconstruction, and/or weight based adjustment of the mA/kV was utilized to reduce the radiation dose to as low as reasonably achievable. COMPARISON: None. HISTORY: ORDERING SYSTEM PROVIDED HISTORY: trauma TECHNOLOGIST PROVIDED HISTORY: trauma FINDINGS: Head CT: There is calcification within the cortex and part of subcortical white matter within the left temporoparietal region. Finding is likely sequela to an old insult.   There is no acute infarction, intracranial hemorrhage or intracranial mass lesion. No mass effect, midline shift or extra-axial collection is noted. The brain parenchyma is otherwise normal.  The cerebellar tonsils are in normal position. The ventricles, sulci, and cisterns are within normal limits in size and configuration. The globes and orbits are within normal limits. The visualized extracranial structures including paranasal sinuses and mastoid air cells are unremarkable. No fracture is identified. Cervical: BONES/ALIGNMENT: There is no acute fracture or traumatic malalignment. DEGENERATIVE CHANGES: Mild disc and facet degenerative disease at C6-C7. SOFT TISSUES: There is no prevertebral soft tissue swelling. Thoracic: BONES/ALIGNMENT: Age indeterminate superior endplate compression fracture of T5 with 10-15% height loss. Age indeterminate inferior endplate compression fracture of T6 with 10-15% height loss. Age indeterminate superior endplate compression fracture of T7 with 10-15% height loss. Chronic appearing compression fracture of T8-T9, T10 and T11 and T12 with 10% height loss. . DEGENERATIVE CHANGES: No high-grade canal or foraminal stenosis. SOFT TISSUES: There is no prevertebral soft tissue swelling. Lumbar: BONES/ALIGNMENT: There is no acute fracture or traumatic malalignment. DEGENERATIVE CHANGES: Moderate disc and facet degenerative disease at L3-L4 L4-L5 and L5-S1. At L3-L4, moderate canal stenosis and moderate bilateral neural foraminal narrowing. SOFT TISSUES: There is no prevertebral soft tissue swelling. CT chest: Lines and tubes:  None. Lungs and Airways and Pleura:  Central airways are patent. No lung consolidation. No pleural effusion. No pneumothorax. Lymph nodes: No pathologically enlarged mediastinal, hilar, lower cervical, or chest wall lymph nodes. Cardiovascular and Mediastinum: No acute aortic pathology. Cardiac chamber sizes appear to measure within normal limits on this non ECG gated study.  No pericardial effusion. The thyroid gland is unremarkable. The esophagus is unremarkable. Bones/Soft tissues: For detailed description of thoracic spine please refer to the thoracic spine CT report . No definite suspicious lytic or blastic foci. CT abdomen and pelvis: Organs: The liver, spleen, adrenal glands, gallbladder, pancreas, kidneys and ureters and pelvic organs including the urinary bladder appear unremarkable. Peritoneum / Retroperitoneum:  No free air or free fluid is noted. No pathologically enlarged lymphadenopathy. The vasculature do not demonstrate acute abnormality. GI Tract:  No distention or wall thickening. Bones and Soft Tissues: No fracture. No concerning lytic or sclerotic lesions are noted. Head CT: No acute intracranial abnormality. No evidence for acute intracranial hemorrhage, territorial infarction or intracranial mass lesion. Calcification of cortex and subcortical white matter of left temporoparietal region is likely sequela to old insult. 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 Cervical CT: No acute osseous abnormality of the cervical spine. No fracture. Thoracic CT: Age indeterminate superior endplate compression fracture of T5 with 10-15% height loss. Age indeterminate inferior endplate compression fracture of T6 with 10-15% height loss. Age indeterminate superior endplate compression fracture of T7 with 10-15% height loss. For further evaluation thoracic spine MRI can be obtained. Chronic appearing compression fracture of T8-T9, T10 and T11 and T12 with 10% height loss. . Lumbar spine CT: No acute osseous abnormality of lumbar spine. No fracture. CT of the chest abdomen and pelvis: No acute traumatic injury within the chest abdomen and pelvis.   For detailed description of thoracic spine please refer to the thoracic spine CT report     CT THORACIC SPINE TRAUMA RECONSTRUCTION    Result Date: 4/11/2022  EXAMINATION: CT OF THE HEAD WITHOUT CONTRAST; CT OF THE CERVICAL SPINE WITHOUT CONTRAST; CT OF THE LUMBAR SPINE WITHOUT CONTRAST; CT OF THE CHEST, ABDOMEN, AND PELVIS WITH CONTRAST; CT OF THE THORACIC SPINE WITHOUT CONTRAST  4/11/2022 5:02 pm TECHNIQUE: CT of the head was performed without the administration of intravenous contrast. Dose modulation, iterative reconstruction, and/or weight based adjustment of the mA/kV was utilized to reduce the radiation dose to as low as reasonably achievable.; CT of the cervical spine was performed without the administration of intravenous contrast. Multiplanar reformatted images are provided for review. Dose modulation, iterative reconstruction, and/or weight based adjustment of the mA/kV was utilized to reduce the radiation dose to as low as reasonably achievable.; CT of the lumbar spine was performed without the administration of intravenous contrast. Multiplanar reformatted images are provided for review. Adjustment of mA and/or kV according to patient size was utilized. Dose modulation, iterative reconstruction, and/or weight based adjustment of the mA/kV was utilized to reduce the radiation dose to as low as reasonably achievable.; CT of the chest, abdomen and pelvis was performed with the administration of intravenous contrast. Multiplanar reformatted images are provided for review. Dose modulation, iterative reconstruction, and/or weight based adjustment of the mA/kV was utilized to reduce the radiation dose to as low as reasonably achievable.; CT of the thoracic spine was performed without the administration of intravenous contrast. Multiplanar reformatted images are provided for review. Dose modulation, iterative reconstruction, and/or weight based adjustment of the mA/kV was utilized to reduce the radiation dose to as low as reasonably achievable. COMPARISON: None.  HISTORY: ORDERING SYSTEM PROVIDED HISTORY: trauma TECHNOLOGIST PROVIDED HISTORY: trauma FINDINGS: Head CT: There is calcification within the cortex and part of subcortical white matter within the left temporoparietal region. Finding is likely sequela to an old insult. There is no acute infarction, intracranial hemorrhage or intracranial mass lesion. No mass effect, midline shift or extra-axial collection is noted. The brain parenchyma is otherwise normal.  The cerebellar tonsils are in normal position. The ventricles, sulci, and cisterns are within normal limits in size and configuration. The globes and orbits are within normal limits. The visualized extracranial structures including paranasal sinuses and mastoid air cells are unremarkable. No fracture is identified. Cervical: BONES/ALIGNMENT: There is no acute fracture or traumatic malalignment. DEGENERATIVE CHANGES: Mild disc and facet degenerative disease at C6-C7. SOFT TISSUES: There is no prevertebral soft tissue swelling. Thoracic: BONES/ALIGNMENT: Age indeterminate superior endplate compression fracture of T5 with 10-15% height loss. Age indeterminate inferior endplate compression fracture of T6 with 10-15% height loss. Age indeterminate superior endplate compression fracture of T7 with 10-15% height loss. Chronic appearing compression fracture of T8-T9, T10 and T11 and T12 with 10% height loss. . DEGENERATIVE CHANGES: No high-grade canal or foraminal stenosis. SOFT TISSUES: There is no prevertebral soft tissue swelling. Lumbar: BONES/ALIGNMENT: There is no acute fracture or traumatic malalignment. DEGENERATIVE CHANGES: Moderate disc and facet degenerative disease at L3-L4 L4-L5 and L5-S1. At L3-L4, moderate canal stenosis and moderate bilateral neural foraminal narrowing. SOFT TISSUES: There is no prevertebral soft tissue swelling. CT chest: Lines and tubes:  None. Lungs and Airways and Pleura:  Central airways are patent. No lung consolidation. No pleural effusion. No pneumothorax.  Lymph nodes: No pathologically enlarged mediastinal, hilar, lower cervical, or chest detailed description of thoracic spine please refer to the thoracic spine CT report           Impression:      61-year-old male came in by EMS as a trauma priority reportedly found down, patient has a history of seizure disorder, partial seizure generalized, per the wife he takes Lamictal 750 in the morning and 800 in the evening, Dilantin 200 mg in the morning and 400 mg he in the evening,    -CT head no acute intracranial abnormality    Plan:     -Keep home dose of Dilantin and Lamictal  -Medical management per primary team  -We will get EEG tomorrow  -Lamictal level and phenytoin: Ordered  -We will follow          Electronically signed by Verito Jefferson MD on 4/11/2022 at 6:23 PM      Electronically signed by   Verito Jefferson MD  4/11/2022  6:23 PM

## 2022-04-11 NOTE — CONSULTS
[facility]    Department of Neurosurgery  Resident Consult Note      Reason for Consult:  Age indeterminate  T spine fractures  Requesting Physician:  Dr. Pickens Ion:   []Dr. Sherine Dee  [x]Dr. Dre Henao  []Dr. Sun Kuo     History Obtained From:  patient, electronic medical record    CHIEF COMPLAINT:         Found down, seizures    HISTORY OF PRESENT ILLNESS:       The patient is a 54 y.o. male who presents as a trauma priority. Patient was found down outside, with known history of seizure disorder. In the emergency department, patient was alert and oriented, says he felt like he had a seizure. He takes Lamictal and Dilantin at home. Since the patient was a trauma priority, he underwent full trauma pan scan and was found to have chronic compression fractures of T8-T12 with 10% height loss as well as age-indeterminate superior endplate compression fracture of T5 with 10 to 15% height loss, age-indeterminate inferior endplate compression fracture of T6 with 10 to 15% height loss, age indeterminate superior endplate compression fracture of T7 with 10 to 15% height loss. Patient denies any headache, back pain, numbness/tingling/weakness of the arms or legs. PAST MEDICAL HISTORY :       Past Medical History:        Diagnosis Date    Seizures [facility])        Past Surgical History:    History reviewed. No pertinent surgical history.     Social History:   Social History     Socioeconomic History    Marital status: Single     Spouse name: Not on file    Number of children: Not on file    Years of education: Not on file    Highest education level: Not on file   Occupational History    Not on file   Tobacco Use    Smoking status: Never Smoker    Smokeless tobacco: Never Used   Substance and Sexual Activity    Alcohol use: Not Currently    Drug use: Not Currently    Sexual activity: Not on file   Other Topics Concern    Not on file   Social History Narrative    Not on file Social Determinants of Health     Financial Resource Strain:     Difficulty of Paying Living Expenses: Not on file   Food Insecurity:     Worried About Running Out of Food in the Last Year: Not on file    Barbra of Food in the Last Year: Not on file   Transportation Needs:     Lack of Transportation (Medical): Not on file    Lack of Transportation (Non-Medical): Not on file   Physical Activity:     Days of Exercise per Week: Not on file    Minutes of Exercise per Session: Not on file   Stress:     Feeling of Stress : Not on file   Social Connections:     Frequency of Communication with Friends and Family: Not on file    Frequency of Social Gatherings with Friends and Family: Not on file    Attends [employer] Services: Not on file    Active Member of [address] [place] [address] [place] or Organizations: Not on file    Attends Club or Organization Meetings: Not on file    Marital Status: Not on file   Intimate Partner Violence:     Fear of Current or Ex-Partner: Not on file    Emotionally Abused: Not on file    Physically Abused: Not on file    Sexually Abused: Not on file   Housing Stability:     Unable to Pay for Housing in the Last Year: Not on file    Number of [place] in the Last Year: Not on file    Unstable Housing in the Last Year: Not on file       Family History:   History reviewed. No pertinent family history. Allergies:   Patient has no known allergies. Home Medications:  Prior to Admission medications    Medication Sig Start Date End Date Taking? Authorizing Provider   lamoTRIgine (LAMICTAL) 100 MG tablet Take by mouth daily Unsure of doses   Yes Historical Provider, MD   phenytoin (DILANTIN) 100 MG ER capsule Take by mouth 2 times daily Unsure of doses   Yes Historical Provider, MD       Current Medications:   No current facility-administered medications for this encounter.     REVIEW OF SYSTEMS:       General ROS - No fevers, no chills  Ophthalmic ROS - No changes in vision from baseline  ENT ROS - No sore throat, no rhinorrhea  Respiratory ROS - no cough, no shortness of breath  Cardiovascular ROS - No chest pain  Gastrointestinal ROS - No abdominal pain, no nausea, no vomiting  Genito-Urinary ROS - No dysuria  Musculoskeletal ROS - No neck pain, no back pain  Neurological ROS - No focal weakness or loss of sensation, no headache; + seizures  Dermatological ROS - No lesions, no rash  Vascular/Lymphatic ROS - No edema    PHYSICAL EXAM:       Vitals:    04/11/22 1635   BP: 120/80   Pulse: 91   Resp: 16   Temp:    SpO2: 95%   /74   Pulse 90   Temp 97.9 °F (36.6 °C) (Oral)   Resp 16   Ht 6' (1.829 m)   Wt 185 lb (83.9 kg)   SpO2 94%   BMI 25.09 kg/m²       CONSTITUTIONAL:  Well developed, well nourished, alert and oriented x 3, in no acute distress, nontoxic. No dysarthria, no aphasia. EOMI.     HEAD:  normocephalic, atraumatic    EYES:  PERRLA, EOMI   ENT:  moist mucous membranes, no stridor, no tracheal deviation   NECK:  no midline tenderness to palpation, no step-offs or deformities   BACK:  no midline tenderness to palpation, no step-offs or deformities    LUNGS:  CTAB, equal air entry bilaterally, no wheezes or rales,   Pattern of breathing: regular   CARDIOVASCULAR:  RRR, no murmur   ABDOMEN:  Soft, no rigidity   NEUROLOGIC:  EYE OPENING     Spontaneous - 4 [x]       To voice - 3 []       To pain - 2 []       None - 1 []    VERBAL RESPONSE     Appropriate, oriented - 5 [x]       Dazed or confused - 4 []       Syllables, expletives - 3 []       Grunts - 2 []       None - 1 []    MOTOR RESPONSE     Spontaneous, command - 6 [x]       Localizes pain - 5 []       Withdraws pain - 4 []       Abnormal flexion - 3 []       Abnormal extension - 2 []       None - 1 []            Total GCS: 15    MENTAL STATUS:  A & O x3, awake             BRAINSTEM:  Pupillary equal round and reactive to light    Cranial Nerves:    cranial nerves II-XII are grossly intact    MOTOR EXAM:    Drift:  absent  Tone: normal    Motor exam is symmetrical 5 out of 5 all extremities bilaterally    SENSORY:    Touch:    Right Upper Extremity:  normal  Left Upper Extremity:  normal  Right Lower Extremity:  normal  Left Lower Extremity:  normal    Deep Tendon Reflexes:    Right Bicep:  2+  Left Bicep:  2+  Right Knee:  2+  Left Knee:  2+    Plantar Response:    Right:  equivocal  Left:  equivocal    Clonus:  absent  Boyd's:  N/A     SKIN:  no rash      LABS AND IMAGING:     Labs:  CBC with Differential:    Lab Results   Component Value Date    WBC 9.5 04/11/2022    RBC 4.65 04/11/2022    HGB 14.6 04/11/2022    HCT 44.2 04/11/2022     04/11/2022    MCV 95.1 04/11/2022    MCH 31.4 04/11/2022    MCHC 33.0 04/11/2022    RDW 13.2 04/11/2022     BMP:    Lab Results   Component Value Date     04/11/2022    K 3.4 04/11/2022    CL 99 04/11/2022    CO2 20 04/11/2022    BUN 15 04/11/2022    CREATININE 0.83 04/11/2022    GLUCOSE 186 04/11/2022       Radiology Review:      Head CT: No acute intracranial abnormality.  No evidence for acute   intracranial hemorrhage, territorial infarction or intracranial mass lesion. Calcification of cortex and subcortical white matter of left temporoparietal   region is likely sequela to old insult.  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       Cervical CT: No acute osseous abnormality of the cervical spine.  No fracture.       Thoracic CT:       Age indeterminate superior endplate compression fracture of T5 with 10-15%   height loss. Age indeterminate inferior endplate compression fracture of T6   with 10-15% height loss. Age indeterminate superior endplate compression   fracture of T7 with 10-15% height loss.  For further evaluation thoracic   spine MRI can be obtained.       Chronic appearing compression fracture of T8-T9, T10 and T11 and T12 with 10%   height loss. .       Lumbar spine CT: No acute osseous abnormality of lumbar spine.  No fracture.       CT of the chest abdomen and pelvis: No acute traumatic injury within the   chest abdomen and pelvis.  For detailed description of thoracic spine please   refer to the thoracic spine CT report         ASSESSMENT AND PLAN:     There is no problem list on file for this patient. A/P:  Jay Jay Starr is a 54 y.o. male who presents with the following findings on CT imaging after being found down as a trauma priority:  - Chronic compression fractures of T8-T12 with 10% height loss  - Age-indeterminate superior endplate compression fracture of T5 with 10 to 15% height loss - Age-indeterminate inferior endplate compression fracture of T6 with 10 to 15% height loss  - Age indeterminate superior endplate compression fracture of T7 with 10 to 15% height loss    Patient care will be discussed with attending, will reevaluate patient along with attending     - No neurosurgical interventions planned for now  - CTLS recommendations: no restrictions   - Neuro checks per protocol  - Hold all antiplatelets and anticoagulants    Additional recommendations may follow    Please contact neurosurgery with any changes in patient's neurologic status. Thank you for your consult. Dr. Arias Pichardo MD  Emergency Medicine Resident, PGY-2  Neurosurgery/Neuro Critical Care Service  4/11/2022 6:43 PM      Please note that this chart was generated using voice recognition Dragon dictation software. Although every effort was made to ensure the accuracy of this automated transcription, some errors in transcription may have occurred.

## 2022-04-11 NOTE — FLOWSHEET NOTE
[facility] CARE DEPARTMENT - [address] 85     Emergency/Trauma Note    PATIENT NAME: Ca Trauma Gary Cam / Ryan George    Shift date: 4/11/22  Shift day: Monday   Shift # 2    Room # TRAUMA A/TRAUMAA   Name: Jessica Burt            Age: 82 y.o. Gender: male          Samaritan: None   Place of Sabianism:     Trauma/Incident type: Adult Trauma Priority  Admit Date & Time: 4/11/2022  4:20 PM  TRAUMA NAME: Xxhiwarren    ADVANCE DIRECTIVES IN CHART? No    NAME OF DECISION MAKER: N/A    RELATIONSHIP OF DECISION MAKER TO PATIENT: N/A    PATIENT/EVENT DESCRIPTION:  Jessica Burt is a 82 y.o. male who arrived via [address] [place] [address] EMS from work as an ED Trauma Priority. Patient's name: Ryan George. Date of Birth is [birth year]. Patient appeared to be talking and answering questions appropriately  Pt to be admitted to TRAUMA A/TRAUMAA. SPIRITUAL ASSESSMENT/INTERVENTION:  Lida Treviño was a ministry of presence to medical staff and to patient.  spoke with EMS for information which was communicated.  gave patient information to appropriate channels in the ED.  was able to speak to patient directly who responded to his name and confirmed his date of birth.  asked patient if he wanted family/loved ones contacted and he stated not at this time. PATIENT BELONGINGS:  With patient    ANY BELONGINGS OF SIGNIFICANT VALUE NOTED:  N/A    REGISTRATION STAFF NOTIFIED? Yes      WHAT IS YOUR SPIRITUAL CARE PLAN FOR THIS PATIENT?:   Chaplains will remain available to offer spiritual and emotional support as needed.       Electronically signed by Amarilis Ocampo, on 4/11/2022 at 5:00 PM.  [facility]  [phone]       04/11/22 9705   Encounter Summary   Services provided to: Patient   Referral/Consult From: Multi-disciplinary team   Continue Visiting   (4/11/22)   Complexity of Encounter Moderate   Length of Encounter 45 minutes   Spiritual Assessment Completed Yes   Crisis   Type Trauma   Assessment Approachable;Coping; Anxious   Intervention Sustaining presence/ Ministry of presence   Outcome Coping

## 2022-04-11 NOTE — ED PROVIDER NOTES
[facility]  ED     Emergency Department     Faculty Attestation    I performed a history and physical examination of the patient and discussed management with the resident. I reviewed the residents note and agree with the documented findings and plan of care. Any areas of disagreement are noted on the chart. I was personally present for the key portions of any procedures. I have documented in the chart those procedures where I was not present during the key portions. I have reviewed the emergency nurses triage note. I agree with the chief complaint, past medical history, past surgical history, allergies, medications, social and family history as documented unless otherwise noted below. For Physician Assistant/ Nurse Practitioner cases/documentation I have personally evaluated this patient and have completed at least one if not all key elements of the E/M (history, physical exam, and MDM). Additional findings are as noted. Patient brought in by EMS as a trauma priority. Patient was reportedly found down outside. Patient does have a known seizure disorder but no one witnessed a seizure. On EMS arrival, patient was found to be confused and was not able to say what occurred. On arrival here, patient is alert and oriented. He still does not know what happened. He says he does feel like he had a seizure. He says he takes Lamictal and Dilantin for seizures and says he has been compliant. He denies any pain other than to his neck from the cervical collar. Airway is intact and breath sounds are equal bilaterally. Will obtain imaging and labs and await any trauma surgery recommendations.       Ann Marie Dickson MD  Attending Emergency  Physician              Jackie Xavier MD  04/11/22 8238

## 2022-04-11 NOTE — ED PROVIDER NOTES
[facility]MED SURG  Emergency Department Encounter  EmergencyMedicine Resident     Pt Name:Puneet Ward  MRN: [id number]  [place] 1970  Date of evaluation: 4/11/22  PCP:  No primary care provider on file. This patient was evaluated in the Emergency Department for symptoms described in the history of present illness. The patient was evaluated in the context of the global COVID-19 pandemic, which necessitated consideration that the patient might be at risk for infection with the SARS-CoV-2 virus that causes COVID-19. Institutional protocols and algorithms that pertain to the evaluation of patients at risk for COVID-19 are in a state of rapid change based on information released by regulatory bodies including the CDC and federal and state organizations. These policies and algorithms were followed during the patient's care in the ED. CHIEF COMPLAINT       Chief Complaint   Patient presents with    Loss of Consciousness     found on ground unconscious       HISTORY OF PRESENT ILLNESS  (Location/Symptom, Timing/Onset, Context/Setting, Quality, Duration, Modifying Factors, Severity.)      Romeo Dalton is a 54 y.o. male who presents with found down. Patient was found down by EMS, noted to have ecchymosis on his flank, brought in by EMS as a trauma priority. Patient is groggy, appears postictal, reports that he does have history of seizures, takes Lamictal and Dilantin and is taking these medications as prescribed. Patient does not know what happened. Patient reports that he feels like he had a seizure though. Patient is denying any pain at this time. PAST MEDICAL / SURGICAL / SOCIAL / FAMILY HISTORY      has a past medical history of Seizures ([address] has no past surgical history on file.       Social History     Socioeconomic History    Marital status: Single     Spouse name: Not on file    Number of children: Not on file    Years of education: Not on file    Highest education level: Not on file   Occupational History    Not on file   Tobacco Use    Smoking status: Never Smoker    Smokeless tobacco: Never Used   Substance and Sexual Activity    Alcohol use: Not Currently    Drug use: Not Currently    Sexual activity: Not on file   Other Topics Concern    Not on file   Social History Narrative    Not on file     Social Determinants of Health     Financial Resource Strain:     Difficulty of Paying Living Expenses: Not on file   Food Insecurity:     Worried About Running Out of Food in the Last Year: Not on file    Barbra of Food in the Last Year: Not on file   Transportation Needs:     Lack of Transportation (Medical): Not on file    Lack of Transportation (Non-Medical): Not on file   Physical Activity:     Days of Exercise per Week: Not on file    Minutes of Exercise per Session: Not on file   Stress:     Feeling of Stress : Not on file   Social Connections:     Frequency of Communication with Friends and Family: Not on file    Frequency of Social Gatherings with Friends and Family: Not on file    Attends [employer] Services: Not on file    Active Member of [address] [place] [address] or Organizations: Not on file    Attends Club or Organization Meetings: Not on file    Marital Status: Not on file   Intimate Partner Violence:     Fear of Current or Ex-Partner: Not on file    Emotionally Abused: Not on file    Physically Abused: Not on file    Sexually Abused: Not on file   Housing Stability:     Unable to Pay for Housing in the Last Year: Not on file    Number of [place] in the Last Year: Not on file    Unstable Housing in the Last Year: Not on file       History reviewed. No pertinent family history. Allergies:  Patient has no known allergies. Home Medications:  Prior to Admission medications    Medication Sig Start Date End Date Taking?  Authorizing Provider   lamoTRIgine (LAMICTAL) 100 MG tablet Take by mouth daily Unsure of doses   Yes Historical Provider, MD   phenytoin (DILANTIN) 100 MG ER capsule Take by mouth 2 times daily Unsure of doses   Yes Historical Provider, MD       REVIEW OF SYSTEMS    (2-9 systems for level 4, 10 or more for level 5)      Review of Systems   Constitutional: Negative for chills, diaphoresis, fatigue and fever. HENT: Negative for congestion, rhinorrhea and sore throat. Eyes: Negative for visual disturbance. Respiratory: Negative for cough and shortness of breath. Cardiovascular: Negative for chest pain, palpitations and leg swelling. Gastrointestinal: Negative for abdominal pain, blood in stool, constipation, diarrhea, nausea and vomiting. Genitourinary: Negative for dysuria, flank pain and hematuria. Musculoskeletal: Negative for neck pain and neck stiffness. Skin: Negative for pallor and rash. Neurological: Positive for seizures. Negative for dizziness, syncope, facial asymmetry, weakness, light-headedness, numbness and headaches. Psychiatric/Behavioral: Negative for confusion. PHYSICAL EXAM   (up to 7 for level 4, 8 or more for level 5)      INITIAL VITALS:   /70   Pulse 79   Temp 98.6 °F (37 °C) (Oral)   Resp 15   Ht 6' (1.829 m)   Wt 185 lb (83.9 kg)   SpO2 93%   BMI 25.09 kg/m²     Physical Exam  Constitutional:       General: He is not in acute distress. Appearance: He is well-developed. He is not ill-appearing, toxic-appearing or diaphoretic. HENT:      Head: Normocephalic and atraumatic. Right Ear: Tympanic membrane, ear canal and external ear normal. There is no impacted cerumen. Left Ear: Tympanic membrane, ear canal and external ear normal. There is no impacted cerumen. Nose: Nose normal. No congestion or rhinorrhea. Mouth/Throat:      Mouth: Mucous membranes are moist.      Pharynx: Oropharynx is clear. No oropharyngeal exudate or posterior oropharyngeal erythema. Eyes:      General:         Right eye: No discharge. Left eye: No discharge.       Extraocular Movements: Extraocular movements intact. Pupils: Pupils are equal, round, and reactive to light. Neck:      Vascular: No JVD. Trachea: No tracheal deviation. Cardiovascular:      Rate and Rhythm: Normal rate and regular rhythm. Pulses: Normal pulses. Heart sounds: Normal heart sounds. No murmur heard. No friction rub. No gallop. Pulmonary:      Effort: Pulmonary effort is normal. No respiratory distress. Breath sounds: Normal breath sounds. No stridor. No wheezing, rhonchi or rales. Chest:      Chest wall: No tenderness. Abdominal:      General: There is no distension. Palpations: Abdomen is soft. There is no mass. Tenderness: There is no abdominal tenderness. There is no right CVA tenderness, left CVA tenderness or guarding. Musculoskeletal:         General: No tenderness. Normal range of motion. Cervical back: Normal range of motion and neck supple. Right lower leg: No edema. Left lower leg: No edema. Skin:     General: Skin is warm. Capillary Refill: Capillary refill takes less than 2 seconds. Neurological:      Mental Status: He is alert and oriented to person, place, and time. Cranial Nerves: No cranial nerve deficit. Sensory: No sensory deficit. Motor: No weakness.    Psychiatric:         Behavior: Behavior normal.         DIFFERENTIAL  DIAGNOSIS     PLAN (LABS / IMAGING / EKG):  Orders Placed This Encounter   Procedures    COVID-19, Rapid    CT CHEST ABDOMEN PELVIS W CONTRAST    CT HEAD WO CONTRAST    CT CERVICAL SPINE WO CONTRAST    CT THORACIC SPINE TRAUMA RECONSTRUCTION    CT LUMBAR SPINE TRAUMA RECONSTRUCTION    Trauma Panel    PREVIOUS SPECIMEN    Protime-INR    APTT    Basic Metabolic Panel w/ Reflex to MG    CBC with Auto Differential    Diet NPO Exceptions are: Sips of Water with Meds    Vital signs per unit routine    Notify patient's primary care physician of admission    Place intermittent pneumatic compression device    Strict Bedrest    Daily weights    Intake and output    Neurovascular checks    Neuro checks    Full Code    Inpatient consult to Neurosurgery    Inpatient Consult to Neurology    Inpatient Consult to Neurology    OT eval and treat    PT evaluation and treat    Initiate Oxygen Therapy Protocol    Speech language pathology evaluation    EKG 12 Lead    Type and Screen    ADMIT TO INPATIENT       MEDICATIONS ORDERED:  Orders Placed This Encounter   Medications    iopamidol (ISOVUE-370) 76 % injection 130 mL    lamoTRIgine (LAMICTAL) tablet 100 mg    phenytoin (DILANTIN) ER capsule 100 mg    sodium chloride flush 0.9 % injection 5-40 mL    sodium chloride flush 0.9 % injection 5-40 mL    0.9 % sodium chloride infusion    OR Linked Order Group     ondansetron (ZOFRAN-ODT) disintegrating tablet 4 mg     ondansetron (ZOFRAN) injection 4 mg    polyethylene glycol (GLYCOLAX) packet 17 g    lactated ringers infusion    acetaminophen (TYLENOL) tablet 1,000 mg    methocarbamol (ROBAXIN) tablet 750 mg    oxyCODONE (ROXICODONE) immediate release tablet 5 mg    bisacodyl (DULCOLAX) suppository 10 mg       DDX:     DIAGNOSTIC RESULTS / EMERGENCY DEPARTMENT COURSE / MDM   LAB RESULTS:  Results for orders placed or performed during the hospital encounter of 04/11/22   COVID-19, Rapid    Specimen: Nasopharyngeal Swab   Result Value Ref Range    Specimen Description . NASOPHARYNGEAL SWAB     SARS-CoV-2, Rapid Not Detected Not Detected   Trauma Panel   Result Value Ref Range    Ethanol <10 <10 mg/dL    Ethanol percent <0.010 <0.010 %    Blood Bank Specimen BILL FOR SERVICES PERFORMED     BUN 15 6 - 20 mg/dL    WBC 9.5 3.5 - 11.3 k/uL    RBC 4.65 4.21 - 5.77 m/uL    Hemoglobin 14.6 13.0 - 17.0 g/dL    Hematocrit 44.2 40.7 - 50.3 %    MCV 95.1 82.6 - 102.9 fL    MCH 31.4 25.2 - 33.5 pg    MCHC 33.0 28.4 - 34.8 g/dL    RDW 13.2 11.8 - 14.4 %    Platelets 506 (H) 546 - 453 k/uL MPV 8.5 8.1 - 13.5 fL    NRBC Automated 0.0 0.0 per 100 WBC    CREATININE 0.83 0.70 - 1.20 mg/dL    Glucose 186 (H) 70 - 99 mg/dL    hCG Qual CANCEL PT MALE NEGATIVE    Sodium 137 135 - 144 mmol/L    Potassium 3.4 (L) 3.7 - 5.3 mmol/L    Chloride 99 98 - 107 mmol/L    CO2 20 20 - 31 mmol/L    Anion Gap 18 (H) 9 - 17 mmol/L    Protime  sec     Unable to perform testing: Specimen quantity not sufficient. INR       Unable to perform testing: Specimen quantity not sufficient. PTT  sec     Unable to perform testing: Specimen quantity not sufficient. pH, Jc 7.333 7.320 - 7.420    pCO2, Jc 38.8 (L) 39 - 55    pO2, Jc 65.9 (H) 30 - 50    HCO3, Venous 20.0 (L) 24 - 30 mmol/L    Negative Base Excess, Jc 4.9 (H) 0.0 - 2.0 mmol/L    O2 Sat, Jc 93.2 (H) 60.0 - 85.0 %    Carboxyhemoglobin 2.8 0 - 5 %    Pt Temp 37.0     FIO2 INFORMATION NOT PROVIDED    TYPE AND SCREEN   Result Value Ref Range    Expiration Date 04/14/2022,2359     Arm Band Number [id number]     ABO/Rh O POSITIVE     Antibody Screen NEGATIVE        IMPRESSION: 53-year-old male with history of seizures, found down, concern for traumatic injuries, patient was a trauma priority, will obtain trauma imaging, will obtain trauma labs, Lamictal and Dilantin levels.     RADIOLOGY:  CT HEAD WO CONTRAST    Result Date: 4/11/2022  EXAMINATION: CT OF THE HEAD WITHOUT CONTRAST; CT OF THE CERVICAL SPINE WITHOUT CONTRAST; CT OF THE LUMBAR SPINE WITHOUT CONTRAST; CT OF THE CHEST, ABDOMEN, AND PELVIS WITH CONTRAST; CT OF THE THORACIC SPINE WITHOUT CONTRAST  4/11/2022 5:02 pm TECHNIQUE: CT of the head was performed without the administration of intravenous contrast. Dose modulation, iterative reconstruction, and/or weight based adjustment of the mA/kV was utilized to reduce the radiation dose to as low as reasonably achievable.; CT of the cervical spine was performed without the administration of intravenous contrast. Multiplanar reformatted images are provided for review. Dose modulation, iterative reconstruction, and/or weight based adjustment of the mA/kV was utilized to reduce the radiation dose to as low as reasonably achievable.; CT of the lumbar spine was performed without the administration of intravenous contrast. Multiplanar reformatted images are provided for review. Adjustment of mA and/or kV according to patient size was utilized. Dose modulation, iterative reconstruction, and/or weight based adjustment of the mA/kV was utilized to reduce the radiation dose to as low as reasonably achievable.; CT of the chest, abdomen and pelvis was performed with the administration of intravenous contrast. Multiplanar reformatted images are provided for review. Dose modulation, iterative reconstruction, and/or weight based adjustment of the mA/kV was utilized to reduce the radiation dose to as low as reasonably achievable.; CT of the thoracic spine was performed without the administration of intravenous contrast. Multiplanar reformatted images are provided for review. Dose modulation, iterative reconstruction, and/or weight based adjustment of the mA/kV was utilized to reduce the radiation dose to as low as reasonably achievable. COMPARISON: None. HISTORY: ORDERING SYSTEM PROVIDED HISTORY: trauma TECHNOLOGIST PROVIDED HISTORY: trauma FINDINGS: Head CT: There is calcification within the cortex and part of subcortical white matter within the left temporoparietal region. Finding is likely sequela to an old insult. There is no acute infarction, intracranial hemorrhage or intracranial mass lesion. No mass effect, midline shift or extra-axial collection is noted. The brain parenchyma is otherwise normal.  The cerebellar tonsils are in normal position. The ventricles, sulci, and cisterns are within normal limits in size and configuration. The globes and orbits are within normal limits.  The visualized extracranial structures including paranasal sinuses and mastoid air cells are unremarkable. No fracture is identified. Cervical: BONES/ALIGNMENT: There is no acute fracture or traumatic malalignment. DEGENERATIVE CHANGES: Mild disc and facet degenerative disease at C6-C7. SOFT TISSUES: There is no prevertebral soft tissue swelling. Thoracic: BONES/ALIGNMENT: Age indeterminate superior endplate compression fracture of T5 with 10-15% height loss. Age indeterminate inferior endplate compression fracture of T6 with 10-15% height loss. Age indeterminate superior endplate compression fracture of T7 with 10-15% height loss. Chronic appearing compression fracture of T8-T9, T10 and T11 and T12 with 10% height loss. . DEGENERATIVE CHANGES: No high-grade canal or foraminal stenosis. SOFT TISSUES: There is no prevertebral soft tissue swelling. Lumbar: BONES/ALIGNMENT: There is no acute fracture or traumatic malalignment. DEGENERATIVE CHANGES: Moderate disc and facet degenerative disease at L3-L4 L4-L5 and L5-S1. At L3-L4, moderate canal stenosis and moderate bilateral neural foraminal narrowing. SOFT TISSUES: There is no prevertebral soft tissue swelling. CT chest: Lines and tubes:  None. Lungs and Airways and Pleura:  Central airways are patent. No lung consolidation. No pleural effusion. No pneumothorax. Lymph nodes: No pathologically enlarged mediastinal, hilar, lower cervical, or chest wall lymph nodes. Cardiovascular and Mediastinum: No acute aortic pathology. Cardiac chamber sizes appear to measure within normal limits on this non ECG gated study. No pericardial effusion. The thyroid gland is unremarkable. The esophagus is unremarkable. Bones/Soft tissues: For detailed description of thoracic spine please refer to the thoracic spine CT report . No definite suspicious lytic or blastic foci. CT abdomen and pelvis: Organs: The liver, spleen, adrenal glands, gallbladder, pancreas, kidneys and ureters and pelvic organs including the urinary bladder appear unremarkable.  Peritoneum / Retroperitoneum: No free air or free fluid is noted. No pathologically enlarged lymphadenopathy. The vasculature do not demonstrate acute abnormality. GI Tract:  No distention or wall thickening. Bones and Soft Tissues: No fracture. No concerning lytic or sclerotic lesions are noted. Head CT: No acute intracranial abnormality. No evidence for acute intracranial hemorrhage, territorial infarction or intracranial mass lesion. Calcification of cortex and subcortical white matter of left temporoparietal region is likely sequela to old insult. 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 Cervical CT: No acute osseous abnormality of the cervical spine. No fracture. Thoracic CT: Age indeterminate superior endplate compression fracture of T5 with 10-15% height loss. Age indeterminate inferior endplate compression fracture of T6 with 10-15% height loss. Age indeterminate superior endplate compression fracture of T7 with 10-15% height loss. For further evaluation thoracic spine MRI can be obtained. Chronic appearing compression fracture of T8-T9, T10 and T11 and T12 with 10% height loss. . Lumbar spine CT: No acute osseous abnormality of lumbar spine. No fracture. CT of the chest abdomen and pelvis: No acute traumatic injury within the chest abdomen and pelvis.   For detailed description of thoracic spine please refer to the thoracic spine CT report     CT CERVICAL SPINE WO CONTRAST    Result Date: 4/11/2022  EXAMINATION: CT OF THE HEAD WITHOUT CONTRAST; CT OF THE CERVICAL SPINE WITHOUT CONTRAST; CT OF THE LUMBAR SPINE WITHOUT CONTRAST; CT OF THE CHEST, ABDOMEN, AND PELVIS WITH CONTRAST; CT OF THE THORACIC SPINE WITHOUT CONTRAST  4/11/2022 5:02 pm TECHNIQUE: CT of the head was performed without the administration of intravenous contrast. Dose modulation, iterative reconstruction, and/or weight based adjustment of the mA/kV was utilized to reduce the radiation dose to as low as reasonably achievable.; CT of the cervical spine was performed without the administration of intravenous contrast. Multiplanar reformatted images are provided for review. Dose modulation, iterative reconstruction, and/or weight based adjustment of the mA/kV was utilized to reduce the radiation dose to as low as reasonably achievable.; CT of the lumbar spine was performed without the administration of intravenous contrast. Multiplanar reformatted images are provided for review. Adjustment of mA and/or kV according to patient size was utilized. Dose modulation, iterative reconstruction, and/or weight based adjustment of the mA/kV was utilized to reduce the radiation dose to as low as reasonably achievable.; CT of the chest, abdomen and pelvis was performed with the administration of intravenous contrast. Multiplanar reformatted images are provided for review. Dose modulation, iterative reconstruction, and/or weight based adjustment of the mA/kV was utilized to reduce the radiation dose to as low as reasonably achievable.; CT of the thoracic spine was performed without the administration of intravenous contrast. Multiplanar reformatted images are provided for review. Dose modulation, iterative reconstruction, and/or weight based adjustment of the mA/kV was utilized to reduce the radiation dose to as low as reasonably achievable. COMPARISON: None. HISTORY: ORDERING SYSTEM PROVIDED HISTORY: trauma TECHNOLOGIST PROVIDED HISTORY: trauma FINDINGS: Head CT: There is calcification within the cortex and part of subcortical white matter within the left temporoparietal region. Finding is likely sequela to an old insult. There is no acute infarction, intracranial hemorrhage or intracranial mass lesion. No mass effect, midline shift or extra-axial collection is noted. The brain parenchyma is otherwise normal.  The cerebellar tonsils are in normal position.  The ventricles, sulci, and cisterns are within normal limits in size and configuration. The globes and orbits are within normal limits. The visualized extracranial structures including paranasal sinuses and mastoid air cells are unremarkable. No fracture is identified. Cervical: BONES/ALIGNMENT: There is no acute fracture or traumatic malalignment. DEGENERATIVE CHANGES: Mild disc and facet degenerative disease at C6-C7. SOFT TISSUES: There is no prevertebral soft tissue swelling. Thoracic: BONES/ALIGNMENT: Age indeterminate superior endplate compression fracture of T5 with 10-15% height loss. Age indeterminate inferior endplate compression fracture of T6 with 10-15% height loss. Age indeterminate superior endplate compression fracture of T7 with 10-15% height loss. Chronic appearing compression fracture of T8-T9, T10 and T11 and T12 with 10% height loss. . DEGENERATIVE CHANGES: No high-grade canal or foraminal stenosis. SOFT TISSUES: There is no prevertebral soft tissue swelling. Lumbar: BONES/ALIGNMENT: There is no acute fracture or traumatic malalignment. DEGENERATIVE CHANGES: Moderate disc and facet degenerative disease at L3-L4 L4-L5 and L5-S1. At L3-L4, moderate canal stenosis and moderate bilateral neural foraminal narrowing. SOFT TISSUES: There is no prevertebral soft tissue swelling. CT chest: Lines and tubes:  None. Lungs and Airways and Pleura:  Central airways are patent. No lung consolidation. No pleural effusion. No pneumothorax. Lymph nodes: No pathologically enlarged mediastinal, hilar, lower cervical, or chest wall lymph nodes. Cardiovascular and Mediastinum: No acute aortic pathology. Cardiac chamber sizes appear to measure within normal limits on this non ECG gated study. No pericardial effusion. The thyroid gland is unremarkable. The esophagus is unremarkable. Bones/Soft tissues: For detailed description of thoracic spine please refer to the thoracic spine CT report . No definite suspicious lytic or blastic foci. CT abdomen and pelvis: Organs:  The liver, spleen, adrenal glands, gallbladder, pancreas, kidneys and ureters and pelvic organs including the urinary bladder appear unremarkable. Peritoneum / Retroperitoneum:  No free air or free fluid is noted. No pathologically enlarged lymphadenopathy. The vasculature do not demonstrate acute abnormality. GI Tract:  No distention or wall thickening. Bones and Soft Tissues: No fracture. No concerning lytic or sclerotic lesions are noted. Head CT: No acute intracranial abnormality. No evidence for acute intracranial hemorrhage, territorial infarction or intracranial mass lesion. Calcification of cortex and subcortical white matter of left temporoparietal region is likely sequela to old insult. 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 Cervical CT: No acute osseous abnormality of the cervical spine. No fracture. Thoracic CT: Age indeterminate superior endplate compression fracture of T5 with 10-15% height loss. Age indeterminate inferior endplate compression fracture of T6 with 10-15% height loss. Age indeterminate superior endplate compression fracture of T7 with 10-15% height loss. For further evaluation thoracic spine MRI can be obtained. Chronic appearing compression fracture of T8-T9, T10 and T11 and T12 with 10% height loss. . Lumbar spine CT: No acute osseous abnormality of lumbar spine. No fracture. CT of the chest abdomen and pelvis: No acute traumatic injury within the chest abdomen and pelvis.   For detailed description of thoracic spine please refer to the thoracic spine CT report     CT CHEST ABDOMEN PELVIS W CONTRAST    Result Date: 4/11/2022  EXAMINATION: CT OF THE HEAD WITHOUT CONTRAST; CT OF THE CERVICAL SPINE WITHOUT CONTRAST; CT OF THE LUMBAR SPINE WITHOUT CONTRAST; CT OF THE CHEST, ABDOMEN, AND PELVIS WITH CONTRAST; CT OF THE THORACIC SPINE WITHOUT CONTRAST  4/11/2022 5:02 pm TECHNIQUE: CT of the head was performed without the administration of intravenous contrast. Dose modulation, iterative reconstruction, and/or weight based adjustment of the mA/kV was utilized to reduce the radiation dose to as low as reasonably achievable.; CT of the cervical spine was performed without the administration of intravenous contrast. Multiplanar reformatted images are provided for review. Dose modulation, iterative reconstruction, and/or weight based adjustment of the mA/kV was utilized to reduce the radiation dose to as low as reasonably achievable.; CT of the lumbar spine was performed without the administration of intravenous contrast. Multiplanar reformatted images are provided for review. Adjustment of mA and/or kV according to patient size was utilized. Dose modulation, iterative reconstruction, and/or weight based adjustment of the mA/kV was utilized to reduce the radiation dose to as low as reasonably achievable.; CT of the chest, abdomen and pelvis was performed with the administration of intravenous contrast. Multiplanar reformatted images are provided for review. Dose modulation, iterative reconstruction, and/or weight based adjustment of the mA/kV was utilized to reduce the radiation dose to as low as reasonably achievable.; CT of the thoracic spine was performed without the administration of intravenous contrast. Multiplanar reformatted images are provided for review. Dose modulation, iterative reconstruction, and/or weight based adjustment of the mA/kV was utilized to reduce the radiation dose to as low as reasonably achievable. COMPARISON: None. HISTORY: ORDERING SYSTEM PROVIDED HISTORY: trauma TECHNOLOGIST PROVIDED HISTORY: trauma FINDINGS: Head CT: There is calcification within the cortex and part of subcortical white matter within the left temporoparietal region. Finding is likely sequela to an old insult. There is no acute infarction, intracranial hemorrhage or intracranial mass lesion. No mass effect, midline shift or extra-axial collection is noted.  The brain thoracic spine please refer to the thoracic spine CT report . No definite suspicious lytic or blastic foci. CT abdomen and pelvis: Organs: The liver, spleen, adrenal glands, gallbladder, pancreas, kidneys and ureters and pelvic organs including the urinary bladder appear unremarkable. Peritoneum / Retroperitoneum:  No free air or free fluid is noted. No pathologically enlarged lymphadenopathy. The vasculature do not demonstrate acute abnormality. GI Tract:  No distention or wall thickening. Bones and Soft Tissues: No fracture. No concerning lytic or sclerotic lesions are noted. Head CT: No acute intracranial abnormality. No evidence for acute intracranial hemorrhage, territorial infarction or intracranial mass lesion. Calcification of cortex and subcortical white matter of left temporoparietal region is likely sequela to old insult. 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 Cervical CT: No acute osseous abnormality of the cervical spine. No fracture. Thoracic CT: Age indeterminate superior endplate compression fracture of T5 with 10-15% height loss. Age indeterminate inferior endplate compression fracture of T6 with 10-15% height loss. Age indeterminate superior endplate compression fracture of T7 with 10-15% height loss. For further evaluation thoracic spine MRI can be obtained. Chronic appearing compression fracture of T8-T9, T10 and T11 and T12 with 10% height loss. . Lumbar spine CT: No acute osseous abnormality of lumbar spine. No fracture. CT of the chest abdomen and pelvis: No acute traumatic injury within the chest abdomen and pelvis.   For detailed description of thoracic spine please refer to the thoracic spine CT report     CT LUMBAR SPINE TRAUMA RECONSTRUCTION    Result Date: 4/11/2022  EXAMINATION: CT OF THE HEAD WITHOUT CONTRAST; CT OF THE CERVICAL SPINE WITHOUT CONTRAST; CT OF THE LUMBAR SPINE WITHOUT CONTRAST; CT OF THE CHEST, ABDOMEN, AND PELVIS WITH CONTRAST; CT OF THE THORACIC SPINE WITHOUT CONTRAST  4/11/2022 5:02 pm TECHNIQUE: CT of the head was performed without the administration of intravenous contrast. Dose modulation, iterative reconstruction, and/or weight based adjustment of the mA/kV was utilized to reduce the radiation dose to as low as reasonably achievable.; CT of the cervical spine was performed without the administration of intravenous contrast. Multiplanar reformatted images are provided for review. Dose modulation, iterative reconstruction, and/or weight based adjustment of the mA/kV was utilized to reduce the radiation dose to as low as reasonably achievable.; CT of the lumbar spine was performed without the administration of intravenous contrast. Multiplanar reformatted images are provided for review. Adjustment of mA and/or kV according to patient size was utilized. Dose modulation, iterative reconstruction, and/or weight based adjustment of the mA/kV was utilized to reduce the radiation dose to as low as reasonably achievable.; CT of the chest, abdomen and pelvis was performed with the administration of intravenous contrast. Multiplanar reformatted images are provided for review. Dose modulation, iterative reconstruction, and/or weight based adjustment of the mA/kV was utilized to reduce the radiation dose to as low as reasonably achievable.; CT of the thoracic spine was performed without the administration of intravenous contrast. Multiplanar reformatted images are provided for review. Dose modulation, iterative reconstruction, and/or weight based adjustment of the mA/kV was utilized to reduce the radiation dose to as low as reasonably achievable. COMPARISON: None. HISTORY: ORDERING SYSTEM PROVIDED HISTORY: trauma TECHNOLOGIST PROVIDED HISTORY: trauma FINDINGS: Head CT: There is calcification within the cortex and part of subcortical white matter within the left temporoparietal region.   Finding is likely sequela to an old insult. There is no acute infarction, intracranial hemorrhage or intracranial mass lesion. No mass effect, midline shift or extra-axial collection is noted. The brain parenchyma is otherwise normal.  The cerebellar tonsils are in normal position. The ventricles, sulci, and cisterns are within normal limits in size and configuration. The globes and orbits are within normal limits. The visualized extracranial structures including paranasal sinuses and mastoid air cells are unremarkable. No fracture is identified. Cervical: BONES/ALIGNMENT: There is no acute fracture or traumatic malalignment. DEGENERATIVE CHANGES: Mild disc and facet degenerative disease at C6-C7. SOFT TISSUES: There is no prevertebral soft tissue swelling. Thoracic: BONES/ALIGNMENT: Age indeterminate superior endplate compression fracture of T5 with 10-15% height loss. Age indeterminate inferior endplate compression fracture of T6 with 10-15% height loss. Age indeterminate superior endplate compression fracture of T7 with 10-15% height loss. Chronic appearing compression fracture of T8-T9, T10 and T11 and T12 with 10% height loss. . DEGENERATIVE CHANGES: No high-grade canal or foraminal stenosis. SOFT TISSUES: There is no prevertebral soft tissue swelling. Lumbar: BONES/ALIGNMENT: There is no acute fracture or traumatic malalignment. DEGENERATIVE CHANGES: Moderate disc and facet degenerative disease at L3-L4 L4-L5 and L5-S1. At L3-L4, moderate canal stenosis and moderate bilateral neural foraminal narrowing. SOFT TISSUES: There is no prevertebral soft tissue swelling. CT chest: Lines and tubes:  None. Lungs and Airways and Pleura:  Central airways are patent. No lung consolidation. No pleural effusion. No pneumothorax. Lymph nodes: No pathologically enlarged mediastinal, hilar, lower cervical, or chest wall lymph nodes. Cardiovascular and Mediastinum: No acute aortic pathology.   Cardiac chamber sizes appear to measure within normal limits on this non ECG gated study. No pericardial effusion. The thyroid gland is unremarkable. The esophagus is unremarkable. Bones/Soft tissues: For detailed description of thoracic spine please refer to the thoracic spine CT report . No definite suspicious lytic or blastic foci. CT abdomen and pelvis: Organs: The liver, spleen, adrenal glands, gallbladder, pancreas, kidneys and ureters and pelvic organs including the urinary bladder appear unremarkable. Peritoneum / Retroperitoneum:  No free air or free fluid is noted. No pathologically enlarged lymphadenopathy. The vasculature do not demonstrate acute abnormality. GI Tract:  No distention or wall thickening. Bones and Soft Tissues: No fracture. No concerning lytic or sclerotic lesions are noted. Head CT: No acute intracranial abnormality. No evidence for acute intracranial hemorrhage, territorial infarction or intracranial mass lesion. Calcification of cortex and subcortical white matter of left temporoparietal region is likely sequela to old insult. 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 Cervical CT: No acute osseous abnormality of the cervical spine. No fracture. Thoracic CT: Age indeterminate superior endplate compression fracture of T5 with 10-15% height loss. Age indeterminate inferior endplate compression fracture of T6 with 10-15% height loss. Age indeterminate superior endplate compression fracture of T7 with 10-15% height loss. For further evaluation thoracic spine MRI can be obtained. Chronic appearing compression fracture of T8-T9, T10 and T11 and T12 with 10% height loss. . Lumbar spine CT: No acute osseous abnormality of lumbar spine. No fracture. CT of the chest abdomen and pelvis: No acute traumatic injury within the chest abdomen and pelvis.   For detailed description of thoracic spine please refer to the thoracic spine CT report     CT THORACIC SPINE TRAUMA RECONSTRUCTION    Result Date: 4/11/2022  EXAMINATION: CT OF THE HEAD WITHOUT CONTRAST; CT OF THE CERVICAL SPINE WITHOUT CONTRAST; CT OF THE LUMBAR SPINE WITHOUT CONTRAST; CT OF THE CHEST, ABDOMEN, AND PELVIS WITH CONTRAST; CT OF THE THORACIC SPINE WITHOUT CONTRAST  4/11/2022 5:02 pm TECHNIQUE: CT of the head was performed without the administration of intravenous contrast. Dose modulation, iterative reconstruction, and/or weight based adjustment of the mA/kV was utilized to reduce the radiation dose to as low as reasonably achievable.; CT of the cervical spine was performed without the administration of intravenous contrast. Multiplanar reformatted images are provided for review. Dose modulation, iterative reconstruction, and/or weight based adjustment of the mA/kV was utilized to reduce the radiation dose to as low as reasonably achievable.; CT of the lumbar spine was performed without the administration of intravenous contrast. Multiplanar reformatted images are provided for review. Adjustment of mA and/or kV according to patient size was utilized. Dose modulation, iterative reconstruction, and/or weight based adjustment of the mA/kV was utilized to reduce the radiation dose to as low as reasonably achievable.; CT of the chest, abdomen and pelvis was performed with the administration of intravenous contrast. Multiplanar reformatted images are provided for review. Dose modulation, iterative reconstruction, and/or weight based adjustment of the mA/kV was utilized to reduce the radiation dose to as low as reasonably achievable.; CT of the thoracic spine was performed without the administration of intravenous contrast. Multiplanar reformatted images are provided for review. Dose modulation, iterative reconstruction, and/or weight based adjustment of the mA/kV was utilized to reduce the radiation dose to as low as reasonably achievable. COMPARISON: None.  HISTORY: ORDERING SYSTEM PROVIDED HISTORY: trauma TECHNOLOGIST PROVIDED HISTORY: trauma hilar, lower cervical, or chest wall lymph nodes. Cardiovascular and Mediastinum: No acute aortic pathology. Cardiac chamber sizes appear to measure within normal limits on this non ECG gated study. No pericardial effusion. The thyroid gland is unremarkable. The esophagus is unremarkable. Bones/Soft tissues: For detailed description of thoracic spine please refer to the thoracic spine CT report . No definite suspicious lytic or blastic foci. CT abdomen and pelvis: Organs: The liver, spleen, adrenal glands, gallbladder, pancreas, kidneys and ureters and pelvic organs including the urinary bladder appear unremarkable. Peritoneum / Retroperitoneum:  No free air or free fluid is noted. No pathologically enlarged lymphadenopathy. The vasculature do not demonstrate acute abnormality. GI Tract:  No distention or wall thickening. Bones and Soft Tissues: No fracture. No concerning lytic or sclerotic lesions are noted. Head CT: No acute intracranial abnormality. No evidence for acute intracranial hemorrhage, territorial infarction or intracranial mass lesion. Calcification of cortex and subcortical white matter of left temporoparietal region is likely sequela to old insult. 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 Cervical CT: No acute osseous abnormality of the cervical spine. No fracture. Thoracic CT: Age indeterminate superior endplate compression fracture of T5 with 10-15% height loss. Age indeterminate inferior endplate compression fracture of T6 with 10-15% height loss. Age indeterminate superior endplate compression fracture of T7 with 10-15% height loss. For further evaluation thoracic spine MRI can be obtained. Chronic appearing compression fracture of T8-T9, T10 and T11 and T12 with 10% height loss. . Lumbar spine CT: No acute osseous abnormality of lumbar spine. No fracture.  CT of the chest abdomen and pelvis: No acute traumatic injury within the chest abdomen and pelvis. For detailed description of thoracic spine please refer to the thoracic spine CT report       EKG      All EKG's are interpreted by the Emergency Department Physician who either signs or Co-signs this chart in the absence of a cardiologist.    EMERGENCY DEPARTMENT COURSE:  Patient came to emergency department, HPI and physical exam were conducted. All nursing notes were reviewed. No acute abnormalities on imaging, patient does have chronic compression fractures of the thoracic spine, neurosurgery consulted. Neurology consulted for seizures. Trauma will admit patient for further management. PROCEDURES:      CONSULTS:  IP CONSULT TO NEUROSURGERY  IP CONSULT TO NEUROLOGY  IP CONSULT TO NEUROLOGY    CRITICAL CARE:      FINAL IMPRESSION      1. Seizure ([facility] [address]          DISPOSITION / PLAN     DISPOSITION Admitted 04/11/2022 07:02:32 PM      PATIENT REFERRED TO:  No follow-up provider specified.     DISCHARGE MEDICATIONS:  Current Discharge Medication List          Thompson Pryor MD  Emergency Medicine Resident    (Please note that portions of thisnote were completed with a voice recognition program.  Efforts were made to edit the dictations but occasionally words are mis-transcribed.)        Thompson Pryor MD  Resident  04/11/22 [id number]

## 2022-04-11 NOTE — H&P
TRAUMA HISTORY AND PHYSICAL EXAMINATION    PATIENT NAME: Ca Trauma Xxhiawatha  YOB: 1878  MEDICAL RECORD NO. [id number]   DATE: 4/11/2022  PRIMARY CARE PHYSICIAN: No primary care provider on file. PATIENT EVALUATED AT THE REQUEST OF : Moon Campoverde    ACTIVATION   []Trauma Alert     [x] Trauma Priority     []Trauma Consult. IMPRESSION:     Found down, fall vs seizure  Hx of seizures, on lamictal and dilantin  Unknown if head trauma, + LOC  Per EMS, was actively seizing in ambulance    [address]:     Found down, possible seizure  Hx of seizures, on lamictal and dilantin  Unknown if head trauma, + LOC  CT pan scan pending    CONSULT SERVICES    [x] Neurosurgery     [] Orthopedic Surgery    [] Cardiothoracic     [] Facial Trauma    [] Plastic Surgery (Burn)    [] Pediatric Surgery     [] Internal Medicine    [] Pulmonary Medicine    [x] Other: Neurology       HISTORY:     SOURCE OF INFORMATION  Patient information was obtained from patient. History/Exam limitations: none. INJURY SUMMARY  Superficial skin irritation in L axilla and left lateral paraspinal area    GENERAL DATA  Age 82 y.o.  male   Patient information was obtained from patient. History/Exam limitations: none. Patient presented to the Emergency Department by ambulance where the patient received cervical collar and back boarded prior to arrival.  Injury Date: 4/11/2022   Approximate Injury Time: Unknown        Transport mode:   [x]Ambulance      [] Helicopter     []Car       [] Other    INJURY LOCATION, (e.g., home, farm, industry, street)  Specific Details of Location (e.g., bedroom, kitchen, garage):  Found down at home  Type of Residence (if occurred in home setting) (e.g., apartment, mobile home, single family home): Single family home    MECHANISM OF INJURY    [] Motor Vehicle Collision   Specific vehicle type involved (e.g., sedan, minivan, SUV, pickup truck):   Collision with (e.g., type of vehicle, building, barn, ditch, tree):     Type of collision  [] Single Vehicle Collision  []Multiple Vehicle Collision  [] unknown collision type    Mechanism considerations  [] Fatality in Same Vehicle      []Ejected       []Rollover          []Extricated    Internal Compartment   []                      []Passenger:      []Front Seat        []Rear Seat     Personal Restraints  [] Unrestrained   []Lap Belt Only Restrained   [] Shoulder Belt Only Restrained  [] 3 Point Restrained  [] unknown     Air Bags  [] Front Air Bag  []Side Air Bag  []Curtain Airbag [][employer] Not Deployed        Pediatric Consideration:      [] Booster Seat  []Infant Car Seat  [] Child Car Seat      [] Motorcycle Collision   Wearing Helmet     []Yes     []No    []Unknown    [] Bicycle Collision Wearing Helmet     []Yes     []No    []Unknown    [] Pedestrian Struck         [x] Fall    [x]From Standing     []From Height  Ft     []Down Stairs ___steps    [] Assault    [] Gunshot  Specify caliber / type of gun: ____________________________    [] Stabbing  Specify weapon type, size: _____________________________    [] Burn  []Flame   []Scald   []Electrical   []Chemical  []Inhalation   []House fire    [] Other ______________________________________________________    [] Other protective devices used / worn ___________________________    HISTORY:     Srinivasa Tes is a 82 y.o. male that presented to the Emergency Department following     Loss of Consciousness []No   [x]Yes Duration(min)  unknown     [] Unknown     Total Fluids Given Prior To Arrival  cc    MEDICATIONS:   []  None     []  Information not available due to exam limitations documented above  Prior to Admission medications    Medication Sig Start Date End Date Taking?  Authorizing Provider   lamoTRIgine (LAMICTAL) 100 MG tablet Take by mouth daily Unsure of doses   Yes Historical Provider, MD   phenytoin (DILANTIN) 100 MG ER capsule Take by mouth 2 times daily Unsure of doses   Yes Historical Provider, MD       ALLERGIES:   [x]  None    []   Information not available due to exam limitations documented above   Patient has no known allergies. PAST MEDICAL HISTORY: []  None   []   Information not available due to exam limitations documented above    has a past medical history of Seizures ([facility] [address] has no past surgical history on file. FAMILY HISTORY   [x]   Information not available due to exam limitations documented above    family history is not on file. SOCIAL HISTORY  []   Information not available due to exam limitations documented above     reports that he has never smoked. He has never used smokeless tobacco.   reports previous alcohol use. reports previous drug use.     PERTINENT SYSTEMIC REVIEW:    []   Information not available due to exam limitations documented above    ROS:    GENERAL: Denies fever, chills  HEENT: Denies Headache, eye pain, visual disturbance, hearing disturbance, epistaxis, difficulty swallowing  RESP: Denies SOB, cough, wheezing, chest tightness  CARD: Denies chest pain, palpitations, fluttering  GI: Denies abd pain, N/V, hematemesis, diarrhea, constipation, hematochezia, bowel incontinence  : Denies Dysuria, hematuria, difficulty urinating, urinary incontinence  NEURO: Denies dizziness, light-headedness, weakness, numbness, tingling  MSK: muscle weakness, joint swelling  ENDOCRINE: Denies h/o DM  HEME: Denies h/o coagulopathy d/o, easy bruising, easy bleeding  ALL/IMMUNO: Denies anaphylaxis      PHYSICAL EXAMINATION:     GLASCOW COMA SCALE  NEUROMUSCULAR BLOCKADE PRIOR TO ARRIVAL     [x]No        []Yes      Variable  Score   Variable  Score  Eye opening [x]Spontaneous 4 Verbal  [x]Oriented  5     []To voice  3   []Confused  4    []To pain  2   []Inapp words  3    []None  1   []Incomp words 2       []None  1   Motor   [x]Obeys  6    []Localizes pain 5    []Withdraws(pain) 4    []Flexion(pain) 3  []Extension(pain) 2    []None  1     GCS Total = 15    PHYSICAL EXAMINATION    VITAL SIGNS:   Vitals:    04/11/22 1635   BP: 120/80   Pulse: 91   Resp: 16   Temp:    SpO2: 95%       General Appearance: alert and oriented to person, place and time, well-developed and well-nourished, in no acute distress  Skin: warm and dry, superficial skin irritation to L axilla and left paraspinal area  Head: normocephalic and atraumatic  Eyes: pupils equal, round, and reactive to light, extraocular eye movements intact, conjunctivae normal  ENT: tympanic membrane, external ear and ear canal normal bilaterally, oropharynx clear and moist with normal mucous membranes  Neck: neck supple and non tender without mass, no thyromegaly or thyroid nodules, no cervical lymphadenopathy   Pulmonary/Chest: clear to auscultation bilaterally- no wheezes, rales or rhonchi, normal air movement, no respiratory distress  Cardiovascular: normal rate, normal S1 and S2, no gallops, intact distal pulses and no carotid bruits  Abdomen: soft, non-tender, non-distended, normal bowel sounds, no masses or organomegaly  Genitourinary: Urinary incontinence prior to arrival. Genitals normal without hernia or inguinal adenopathy  Extremities: no cyanosis and no clubbing  Musculoskeletal: normal range of motion, no joint swelling, deformity or tenderness  Neurologic: gait and coordination normal and speech normal     FOCUSED ABDOMINAL SONOGRAM FOR TRAUMA (FAST): A fair  quality examination was performed by Dr. Aly Cisneros and representative images were obtained.   [x] No free fluid in the abdomen or around pericardium        RADIOLOGY    PLAIN FILMS  Ordered Findings  []CHEST []Normal   [] Preliminary findings: Results Pending   []PELVIS  []Normal   [] Preliminary findings: Results Pending  EXTREMITIES      []RUE []Normal   [] Preliminary findings: Results Pending     []LUE  []Normal   [] Preliminary findings: Results Pending     []RLE []Normal   [] Preliminary findings: Results Pending     []LLE  []Normal   [] Preliminary findings: Results Pending   []OTHER []Normal   [] Preliminary findings: Results Pending     CT SCANS  Ordered  [x]HEAD  []Normal   [] Preliminary findings: Results Pending   [x]CHEST  []Normal   [] Preliminary findings: Results Pending   [x]ABD/PELVIS[]Normal  [] Preliminary findings: Results Pending  []FACE []Normal   [] Preliminary findings:   [x]C-SPINE  []Normal   [] Preliminary findings: Results Pending   [x]T-SPINE  []Normal   [] Preliminary findings: Results Pending   [x]L-SPINE  []Normal   [] Preliminary findings: Results Pending     [facility]  []Normal     [] Preliminary findings:   []OTHER   []Normal     [] Preliminary findings:     No results found. LABS    Labs Reviewed   TRAUMA PANEL - Abnormal; Notable for the following components:       Result Value    Platelets 562 (*)     Glucose 186 (*)     Potassium 3.4 (*)     Anion Gap 18 (*)     pCO2, Jc 38.8 (*)     pO2, Jc 65.9 (*)     HCO3, Venous 20.0 (*)     Negative Base Excess, Jc 4.9 (*)     O2 Sat, Jc 93.2 (*)     All other components within normal limits   COVID-19, RAPID   COVID-19, RAPID   PHENYTOIN LEVEL, TOTAL AND FREE   LAMOTRIGINE LEVEL   TRAUMA PANEL   PREVIOUS SPECIMEN   PROTIME-INR   APTT   TYPE AND SCREEN         Atif Resendiz MD  PGY 1  Resident Physician Emergency Medicine  Trauma and General Surgery Service  04/11/22 5:15 PM          Trauma Attending Attestation      I have reviewed the above GCS note(s) and confirmed the key elements of the medical history and physical exam. I have seen and examined the pt. I have discussed the findings, established the care plan and recommendations with Resident, GCS RN, bedside nurse.   Not therapeutic on AED - likely had seizure   Pt had not pain on palpation of thoracic spine - will re-eval   Tertiary exam         Israel Kwon DO  4/12/2022  8:09 AM

## 2022-04-11 NOTE — CONSULTS
TRAUMA HISTORY AND PHYSICAL EXAMINATION    PATIENT NAME: Ca Trauma Xxhiawatha  YOB: 1878  MEDICAL RECORD NO. [id number]   DATE: 4/11/2022  PRIMARY CARE PHYSICIAN: No primary care provider on file. PATIENT EVALUATED AT THE REQUEST OF : Candance Phoenix    ACTIVATION   []Trauma Alert     [x] Trauma Priority     []Trauma Consult. IMPRESSION:     Found down, possible fall vs seizure  Hx of seizures, on lamictal and dilantin  Unknown if head trauma, + LOC    MEDICAL DECISION MAKING AND PLAN:     Found down, possible fall vs seizure  Hx of seizures, on lamictal and dilantin  Unknown if head trauma, + LOC  CT pan scan pending    CONSULT SERVICES    [] Neurosurgery     [] Orthopedic Surgery    [] Cardiothoracic     [] Facial Trauma    [] Plastic Surgery (Burn)    [] Pediatric Surgery     [] Internal Medicine    [] Pulmonary Medicine    [] Other:       HISTORY:     SOURCE OF INFORMATION  Patient information was obtained from patient. History/Exam limitations: none. INJURY SUMMARY  Superficial skin irritation in L axilla and left lateral paraspinal area    GENERAL DATA  Age 82 y.o.  male   Patient information was obtained from patient. History/Exam limitations: none. Patient presented to the Emergency Department by ambulance where the patient received cervical collar and back boarded prior to arrival.  Injury Date: 4/11/2022   Approximate Injury Time: Unknown        Transport mode:   [x]Ambulance      [] Helicopter     []Car       [] Other    INJURY LOCATION, (e.g., home, farm, industry, street)  Specific Details of Location (e.g., bedroom, kitchen, garage):  Found down at home  Type of Residence (if occurred in home setting) (e.g., apartment, mobile home, single family home): Single family home    MECHANISM OF INJURY    [] Motor Vehicle Collision   Specific vehicle type involved (e.g., sedan, minivan, SUV, pickup truck):   Collision with (e.g., type of vehicle, building, barn, ditch, tree):     Type of collision  [] Single Vehicle Collision  []Multiple Vehicle Collision  [] unknown collision type    Mechanism considerations  [] Fatality in Same Vehicle      []Ejected       []Rollover          []Extricated    Internal Compartment   []                      []Passenger:      []Front Seat        []Rear [address]  [] Unrestrained   []Lap Belt Only Restrained   [] Shoulder Belt Only Restrained  [] 3 Point Restrained  [] unknown     Air Bags  [] Front Air Bag  []Side Air Bag  []Curtain Airbag []Digital Envoy Not Deployed        Pediatric Consideration:      [] Booster Seat  []Infant Car Seat  [] Child Car Seat      [] Motorcycle Collision   Wearing Helmet     []Yes     []No    []Unknown    [] Bicycle Collision Wearing Helmet     []Yes     []No    []Unknown    [] Pedestrian Struck         [x] Fall    [x]From Standing     []From Height  Ft     []Down Stairs ___steps    [] Assault    [] Gunshot  Specify caliber / type of gun: ____________________________    [] Stabbing  Specify weapon type, size: _____________________________    [] Burn  []Flame   []Scald   []Electrical   []Chemical  []Inhalation   []House fire    [] Other ______________________________________________________    [] Other protective devices used / worn ___________________________    HISTORY:     Chaitanya Chamberlain is a 82 y.o. male that presented to the Emergency Department following     Loss of Consciousness []No   [x]Yes Duration(min)  unknown     [] Unknown     Total Fluids Given Prior To Arrival  cc    MEDICATIONS:   []  None     []  Information not available due to exam limitations documented above  Prior to Admission medications    Medication Sig Start Date End Date Taking?  Authorizing Provider   lamoTRIgine (LAMICTAL) 100 MG tablet Take by mouth daily Unsure of doses   Yes Historical Provider, MD   phenytoin (DILANTIN) 100 MG ER capsule Take by mouth 2 times daily Unsure of doses   Yes Historical Provider, MD       ALLERGIES: [x]  None    []   Information not available due to exam limitations documented above   Patient has no known allergies. PAST MEDICAL HISTORY: []  None   []   Information not available due to exam limitations documented above    has a past medical history of Seizures ([address] has no past surgical history on file. FAMILY HISTORY   [x]   Information not available due to exam limitations documented above    family history is not on file. SOCIAL HISTORY  []   Information not available due to exam limitations documented above     reports that he has never smoked. He has never used smokeless tobacco.   reports previous alcohol use. reports previous drug use.     PERTINENT SYSTEMIC REVIEW:    []   Information not available due to exam limitations documented above    ROS:    GENERAL: Denies fever, chills  HEENT: Denies Headache, eye pain, visual disturbance, hearing disturbance, epistaxis, difficulty swallowing  RESP: Denies SOB, cough, wheezing, chest tightness  CARD: Denies chest pain, palpitations, fluttering  GI: Denies abd pain, N/V, hematemesis, diarrhea, constipation, hematochezia, bowel incontinence  : Denies Dysuria, hematuria, difficulty urinating, urinary incontinence  NEURO: Denies dizziness, light-headedness, weakness, numbness, tingling  MSK: muscle weakness, joint swelling  ENDOCRINE: Denies h/o DM  HEME: Denies h/o coagulopathy d/o, easy bruising, easy bleeding  ALL/IMMUNO: Denies anaphylaxis      PHYSICAL EXAMINATION:     GLASCOW COMA SCALE  NEUROMUSCULAR BLOCKADE PRIOR TO ARRIVAL     [x]No        []Yes      Variable  Score   Variable  Score  Eye opening [x]Spontaneous 4 Verbal  [x]Oriented  5     []To voice  3   []Confused  4    []To pain  2   []Inapp words  3    []None  1   []Incomp words 2       []None  1   Motor   [x]Obeys  6    []Localizes pain 5    []Withdraws(pain) 4    []Flexion(pain) 3  []Extension(pain) 2    []None  1     GCS Total = 15    PHYSICAL EXAMINATION    VITAL SIGNS:   Vitals: 04/11/22 1635   BP: 120/80   Pulse: 91   Resp: 16   Temp:    SpO2: 95%       General Appearance: alert and oriented to person, place and time, well-developed and well-nourished, in no acute distress  Skin: warm and dry, superficial skin irritation to L axilla and left paraspinal area  Head: normocephalic and atraumatic  Eyes: pupils equal, round, and reactive to light, extraocular eye movements intact, conjunctivae normal  ENT: tympanic membrane, external ear and ear canal normal bilaterally, oropharynx clear and moist with normal mucous membranes  Neck: neck supple and non tender without mass, no thyromegaly or thyroid nodules, no cervical lymphadenopathy   Pulmonary/Chest: clear to auscultation bilaterally- no wheezes, rales or rhonchi, normal air movement, no respiratory distress  Cardiovascular: normal rate, normal S1 and S2, no gallops, intact distal pulses and no carotid bruits  Abdomen: soft, non-tender, non-distended, normal bowel sounds, no masses or organomegaly  Genitourinary: Urinary incontinence prior to arrival. Genitals normal without hernia or inguinal adenopathy  Extremities: no cyanosis and no clubbing  Musculoskeletal: normal range of motion, no joint swelling, deformity or tenderness  Neurologic: gait and coordination normal and speech normal     FOCUSED ABDOMINAL SONOGRAM FOR TRAUMA (FAST): A fair  quality examination was performed by Dr. Gagandeep Macias and representative images were obtained.   [x] No free fluid in the abdomen or around pericardium        RADIOLOGY    PLAIN FILMS  Ordered Findings  []CHEST []Normal   [] Preliminary findings: Results Pending   []PELVIS  []Normal   [] Preliminary findings: Results Pending  EXTREMITIES      []RUE []Normal   [] Preliminary findings: Results Pending     []LUE  []Normal   [] Preliminary findings: Results Pending     []RLE []Normal   [] Preliminary findings: Results Pending     []LLE  []Normal   [] Preliminary findings: Results Pending   []OTHER []Normal [] Preliminary findings: Results Pending     CT SCANS  Ordered  [x]HEAD  []Normal   [] Preliminary findings: Results Pending   [x]CHEST  []Normal   [] Preliminary findings: Results Pending   [x]ABD/PELVIS[]Normal  [] Preliminary findings: Results Pending  []FACE []Normal   [] Preliminary findings:   [x]C-SPINE  []Normal   [] Preliminary findings: Results Pending   [x]T-SPINE  []Normal   [] Preliminary findings: Results Pending   [x]L-SPINE  []Normal   [] Preliminary findings: Results Pending     [facility]  []Normal     [] Preliminary findings:   []OTHER   []Normal     [] Preliminary findings:     No results found.     LABS    Labs Reviewed   TRAUMA PANEL - Abnormal; Notable for the following components:       Result Value    Platelets 693 (*)     Glucose 186 (*)     Potassium 3.4 (*)     Anion Gap 18 (*)     pCO2, Jc 38.8 (*)     pO2, Jc 65.9 (*)     HCO3, Venous 20.0 (*)     Negative Base Excess, Jc 4.9 (*)     O2 Sat, Jc 93.2 (*)     All other components within normal limits   COVID-19, RAPID   COVID-19, RAPID   PHENYTOIN LEVEL, TOTAL AND FREE   LAMOTRIGINE LEVEL   TRAUMA PANEL   PREVIOUS SPECIMEN   PROTIME-INR   APTT   TYPE AND SCREEN         Yudith Ozuna MD  PGY 1  Resident Physician Emergency Medicine  Trauma and General Surgery Service  04/11/22 5:15 PM

## 2022-04-12 LAB
ABSOLUTE EOS #: 0.14 K/UL (ref 0–0.44)
ABSOLUTE IMMATURE GRANULOCYTE: 0.05 K/UL (ref 0–0.3)
ABSOLUTE LYMPH #: 2.16 K/UL (ref 1.1–3.7)
ABSOLUTE MONO #: 1.04 K/UL (ref 0.1–1.2)
ANION GAP SERPL CALCULATED.3IONS-SCNC: 12 MMOL/L (ref 9–17)
BASOPHILS # BLD: 0 % (ref 0–2)
BASOPHILS ABSOLUTE: 0.03 K/UL (ref 0–0.2)
BUN BLDV-MCNC: 11 MG/DL (ref 6–20)
CALCIUM SERPL-MCNC: 8.7 MG/DL (ref 8.6–10.4)
CHLORIDE BLD-SCNC: 103 MMOL/L (ref 98–107)
CO2: 24 MMOL/L (ref 20–31)
CREAT SERPL-MCNC: 0.79 MG/DL (ref 0.7–1.2)
EOSINOPHILS RELATIVE PERCENT: 1 % (ref 1–4)
GFR AFRICAN AMERICAN: >60 ML/MIN
GFR NON-AFRICAN AMERICAN: >60 ML/MIN
GFR SERPL CREATININE-BSD FRML MDRD: NORMAL ML/MIN/{1.73_M2}
GLUCOSE BLD-MCNC: 88 MG/DL (ref 70–99)
HCT VFR BLD CALC: 43.4 % (ref 40.7–50.3)
HEMOGLOBIN: 14.3 G/DL (ref 13–17)
IMMATURE GRANULOCYTES: 0 %
LYMPHOCYTES # BLD: 18 % (ref 24–43)
MCH RBC QN AUTO: 31.2 PG (ref 25.2–33.5)
MCHC RBC AUTO-ENTMCNC: 32.9 G/DL (ref 28.4–34.8)
MCV RBC AUTO: 94.6 FL (ref 82.6–102.9)
MONOCYTES # BLD: 9 % (ref 3–12)
NRBC AUTOMATED: 0 PER 100 WBC
PDW BLD-RTO: 13.5 % (ref 11.8–14.4)
PHENYTOIN FREE: 0.5 UG/ML (ref 1–2)
PHENYTOIN FREE: 1 UG/ML (ref 1–2)
PHENYTOIN LEVEL: 10.6 UG/ML (ref 10–20)
PLATELET # BLD: 429 K/UL (ref 138–453)
PMV BLD AUTO: 8.8 FL (ref 8.1–13.5)
POTASSIUM SERPL-SCNC: 3.8 MMOL/L (ref 3.7–5.3)
RBC # BLD: 4.59 M/UL (ref 4.21–5.77)
SEG NEUTROPHILS: 72 % (ref 36–65)
SEGMENTED NEUTROPHILS ABSOLUTE COUNT: 8.42 K/UL (ref 1.5–8.1)
SODIUM BLD-SCNC: 139 MMOL/L (ref 135–144)
WBC # BLD: 11.8 K/UL (ref 3.5–11.3)

## 2022-04-12 PROCEDURE — 2580000003 HC RX 258: Performed by: PSYCHIATRY & NEUROLOGY

## 2022-04-12 PROCEDURE — 99254 IP/OBS CNSLTJ NEW/EST MOD 60: CPT | Performed by: PSYCHIATRY & NEUROLOGY

## 2022-04-12 PROCEDURE — 6370000000 HC RX 637 (ALT 250 FOR IP): Performed by: STUDENT IN AN ORGANIZED HEALTH CARE EDUCATION/TRAINING PROGRAM

## 2022-04-12 PROCEDURE — 80185 ASSAY OF PHENYTOIN TOTAL: CPT

## 2022-04-12 PROCEDURE — 85025 COMPLETE CBC W/AUTO DIFF WBC: CPT

## 2022-04-12 PROCEDURE — 2580000003 HC RX 258: Performed by: STUDENT IN AN ORGANIZED HEALTH CARE EDUCATION/TRAINING PROGRAM

## 2022-04-12 PROCEDURE — 36415 COLL VENOUS BLD VENIPUNCTURE: CPT

## 2022-04-12 PROCEDURE — 92523 SPEECH SOUND LANG COMPREHEN: CPT

## 2022-04-12 PROCEDURE — 97161 PT EVAL LOW COMPLEX 20 MIN: CPT

## 2022-04-12 PROCEDURE — 6360000002 HC RX W HCPCS: Performed by: PSYCHIATRY & NEUROLOGY

## 2022-04-12 PROCEDURE — 80186 ASSAY OF PHENYTOIN FREE: CPT

## 2022-04-12 PROCEDURE — 97530 THERAPEUTIC ACTIVITIES: CPT

## 2022-04-12 PROCEDURE — 80048 BASIC METABOLIC PNL TOTAL CA: CPT

## 2022-04-12 RX ORDER — ACETAMINOPHEN 500 MG
1000 TABLET ORAL EVERY 8 HOURS PRN
Status: DISCONTINUED | OUTPATIENT
Start: 2022-04-12 | End: 2022-04-12 | Stop reason: HOSPADM

## 2022-04-12 RX ORDER — LAMOTRIGINE 100 MG/1
700 TABLET ORAL DAILY
Status: DISCONTINUED | OUTPATIENT
Start: 2022-04-12 | End: 2022-04-12 | Stop reason: HOSPADM

## 2022-04-12 RX ORDER — PHENYTOIN SODIUM 200 MG/1
200 CAPSULE, EXTENDED RELEASE ORAL DAILY
Status: DISCONTINUED | OUTPATIENT
Start: 2022-04-12 | End: 2022-04-12 | Stop reason: HOSPADM

## 2022-04-12 RX ORDER — PHENYTOIN SODIUM 200 MG/1
400 CAPSULE, EXTENDED RELEASE ORAL EVERY EVENING
Status: DISCONTINUED | OUTPATIENT
Start: 2022-04-12 | End: 2022-04-12 | Stop reason: HOSPADM

## 2022-04-12 RX ORDER — LAMOTRIGINE 100 MG/1
800 TABLET ORAL EVERY EVENING
Status: DISCONTINUED | OUTPATIENT
Start: 2022-04-12 | End: 2022-04-12 | Stop reason: HOSPADM

## 2022-04-12 RX ADMIN — SODIUM CHLORIDE, POTASSIUM CHLORIDE, SODIUM LACTATE AND CALCIUM CHLORIDE: 600; 310; 30; 20 INJECTION, SOLUTION INTRAVENOUS at 01:04

## 2022-04-12 RX ADMIN — PHENYTOIN SODIUM 300 MG: 50 INJECTION INTRAMUSCULAR; INTRAVENOUS at 10:47

## 2022-04-12 RX ADMIN — ACETAMINOPHEN 1000 MG: 500 TABLET ORAL at 01:07

## 2022-04-12 RX ADMIN — SODIUM CHLORIDE, PRESERVATIVE FREE 10 ML: 5 INJECTION INTRAVENOUS at 01:06

## 2022-04-12 RX ADMIN — LAMOTRIGINE 800 MG: 100 TABLET ORAL at 02:38

## 2022-04-12 RX ADMIN — PHENYTOIN SODIUM 200 MG: 200 CAPSULE, EXTENDED RELEASE ORAL at 14:06

## 2022-04-12 RX ADMIN — METHOCARBAMOL TABLETS 750 MG: 750 TABLET, COATED ORAL at 01:06

## 2022-04-12 RX ADMIN — LAMOTRIGINE 700 MG: 100 TABLET ORAL at 14:06

## 2022-04-12 RX ADMIN — PHENYTOIN SODIUM 400 MG: 200 CAPSULE, EXTENDED RELEASE ORAL at 02:37

## 2022-04-12 ASSESSMENT — PAIN DESCRIPTION - LOCATION: LOCATION: GENERALIZED

## 2022-04-12 ASSESSMENT — PAIN SCALES - GENERAL
PAINLEVEL_OUTOF10: 3
PAINLEVEL_OUTOF10: 2

## 2022-04-12 NOTE — PROGRESS NOTES
Trauma Tertiary Survey    Admit Date: 4/11/2022  Hospital day 1    [facility] [place], with seizure     Past Medical History:   Diagnosis Date    Seizures ([address]        Scheduled Meds:   lamoTRIgine  800 mg Oral QPM    phenytoin  400 mg Oral QPM    lamoTRIgine  700 mg Oral Daily    phenytoin  200 mg Oral Daily    polyethylene glycol  17 g Oral Daily     Continuous Infusions:  PRN Meds:acetaminophen, sodium chloride flush    Subjective:     Patient reports mild posterior scalp tenderness, but otherwise has no complaints or deficits. Objective:   No data found. No intake/output data recorded. No intake/output data recorded. Radiology:  CT HEAD WO CONTRAST    Result Date: 4/11/2022  Head CT: No acute intracranial abnormality. No evidence for acute intracranial hemorrhage, territorial infarction or intracranial mass lesion. Calcification of cortex and subcortical white matter of left temporoparietal region is likely sequela to old insult. 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 Cervical CT: No acute osseous abnormality of the cervical spine. No fracture. Thoracic CT: Age indeterminate superior endplate compression fracture of T5 with 10-15% height loss. Age indeterminate inferior endplate compression fracture of T6 with 10-15% height loss. Age indeterminate superior endplate compression fracture of T7 with 10-15% height loss. For further evaluation thoracic spine MRI can be obtained. Chronic appearing compression fracture of T8-T9, T10 and T11 and T12 with 10% height loss. . Lumbar spine CT: No acute osseous abnormality of lumbar spine. No fracture. CT of the chest abdomen and pelvis: No acute traumatic injury within the chest abdomen and pelvis. For detailed description of thoracic spine please refer to the thoracic spine CT report     CT CERVICAL SPINE WO CONTRAST    Result Date: 4/11/2022  Head CT: No acute intracranial abnormality.   No evidence for acute intracranial hemorrhage, territorial infarction or intracranial mass lesion. Calcification of cortex and subcortical white matter of left temporoparietal region is likely sequela to old insult. 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 Cervical CT: No acute osseous abnormality of the cervical spine. No fracture. Thoracic CT: Age indeterminate superior endplate compression fracture of T5 with 10-15% height loss. Age indeterminate inferior endplate compression fracture of T6 with 10-15% height loss. Age indeterminate superior endplate compression fracture of T7 with 10-15% height loss. For further evaluation thoracic spine MRI can be obtained. Chronic appearing compression fracture of T8-T9, T10 and T11 and T12 with 10% height loss. . Lumbar spine CT: No acute osseous abnormality of lumbar spine. No fracture. CT of the chest abdomen and pelvis: No acute traumatic injury within the chest abdomen and pelvis. For detailed description of thoracic spine please refer to the thoracic spine CT report     CT CHEST ABDOMEN PELVIS W CONTRAST    Result Date: 4/11/2022  Head CT: No acute intracranial abnormality. No evidence for acute intracranial hemorrhage, territorial infarction or intracranial mass lesion. Calcification of cortex and subcortical white matter of left temporoparietal region is likely sequela to old insult. 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 Cervical CT: No acute osseous abnormality of the cervical spine. No fracture. Thoracic CT: Age indeterminate superior endplate compression fracture of T5 with 10-15% height loss. Age indeterminate inferior endplate compression fracture of T6 with 10-15% height loss. Age indeterminate superior endplate compression fracture of T7 with 10-15% height loss. For further evaluation thoracic spine MRI can be obtained.  Chronic appearing compression fracture of T8-T9, T10 and T11 and T12 with 10% height loss. . Lumbar spine CT: No acute osseous abnormality of lumbar spine. No fracture. CT of the chest abdomen and pelvis: No acute traumatic injury within the chest abdomen and pelvis. For detailed description of thoracic spine please refer to the thoracic spine CT report     CT LUMBAR SPINE TRAUMA RECONSTRUCTION    Result Date: 4/11/2022  Head CT: No acute intracranial abnormality. No evidence for acute intracranial hemorrhage, territorial infarction or intracranial mass lesion. Calcification of cortex and subcortical white matter of left temporoparietal region is likely sequela to old insult. 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 Cervical CT: No acute osseous abnormality of the cervical spine. No fracture. Thoracic CT: Age indeterminate superior endplate compression fracture of T5 with 10-15% height loss. Age indeterminate inferior endplate compression fracture of T6 with 10-15% height loss. Age indeterminate superior endplate compression fracture of T7 with 10-15% height loss. For further evaluation thoracic spine MRI can be obtained. Chronic appearing compression fracture of T8-T9, T10 and T11 and T12 with 10% height loss. . Lumbar spine CT: No acute osseous abnormality of lumbar spine. No fracture. CT of the chest abdomen and pelvis: No acute traumatic injury within the chest abdomen and pelvis. For detailed description of thoracic spine please refer to the thoracic spine CT report     CT THORACIC SPINE TRAUMA RECONSTRUCTION    Result Date: 4/11/2022  Head CT: No acute intracranial abnormality. No evidence for acute intracranial hemorrhage, territorial infarction or intracranial mass lesion. Calcification of cortex and subcortical white matter of left temporoparietal region is likely sequela to old insult. 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  Cervical CT: No acute osseous abnormality of the cervical spine. No fracture. Thoracic CT: Age indeterminate superior endplate compression fracture of T5 with 10-15% height loss. Age indeterminate inferior endplate compression fracture of T6 with 10-15% height loss. Age indeterminate superior endplate compression fracture of T7 with 10-15% height loss. For further evaluation thoracic spine MRI can be obtained. Chronic appearing compression fracture of T8-T9, T10 and T11 and T12 with 10% height loss. . Lumbar spine CT: No acute osseous abnormality of lumbar spine. No fracture. CT of the chest abdomen and pelvis: No acute traumatic injury within the chest abdomen and pelvis. For detailed description of thoracic spine please refer to the thoracic spine CT report       PHYSICAL EXAM:   GCS:  4 - Opens eyes on own   6 - Follows simple motor commands  5 - Alert and oriented    Pupil size:  Left 3 mm Right 3 mm  Pupil reaction: Yes  Wiggles fingers: Left Yes Right Yes  Hand grasp:   Left normal   Right normal  Wiggles toes: Left Yes    Right Yes  Plantar flexion: Left normal  Right normal    GENERAL:  awake, cooperative, NAD  HEENT:  NCAT. Milt TTP right posterior scalp without overlying abrasion/ecchymosis, minimal edema. CV:  RRR, well perfused  LUNGS:  unlabored breathing on RA  ABDOMEN:  soft, non-distended, without tenderness to palpation  EXTREMITIES: Without gross deformity, radial and DP pulses +2 b/l  MSK:  No tenderness to palpation throughout, without gross deformity. Without C/T/L spine TTP or step-offs. Mild superficial red ecchymosis to right posterior shoulder and left anterior axilla.    NEURO:  A&Ox3, CN 2-12 grossly intact, sensation to light touch grossly intact BUE & BLE, motor strength 5/5  strength, wiggles toes, repositions self in bed independently  SKIN: Warm and dry      Spine:     Spine Tenderness ROM   Cervical 0 /10 Normal   Thoracic 0 /10 Normal   Lumbar 0 /10 Normal     Musculoskeletal    Joint Tenderness Swelling ROM   Right shoulder absent absent normal   Left shoulder absent absent normal   Right elbow absent absent normal   Left elbow absent absent normal   Right wrist absent absent normal   Left wrist absent absent normal   Right hand grasp absent absent normal   Left hand grasp absent absent normal   Right hip absent absent normal   Left hip absent absent normal   Right knee absent absent normal   Left knee absent absent normal   Right ankle absent absent normal   Left ankle absent absent normal   Right foot absent absent normal   Left foot absent absent normal           CONSULTS: neurology, NS    PROCEDURES: EEG today    INJURIES:  Age indeterminate T5-7 compression fx      Patient Active Problem List   Diagnosis    Fall at home, initial encounter         Assessment/Plan:     Negative tertiary exam.

## 2022-04-12 NOTE — PLAN OF CARE
Discussed with patient. Plan to load him with IV phenytoin 300 once. Repeat phyentoin free and total levels. Please contact neurology resident if any questions.

## 2022-04-12 NOTE — CARE COORDINATION
SBIRT completed with pt    Pt denies alcohol use. He reports marijuana use 1-2x month. He denies all other drug use. No prior tx. Pt declines any tx resources. Pt denies any hx or recent feelings of depression            Alcohol Screening and Brief Intervention        Recent Labs     04/11/22  1644   ALC <10       Alcohol Pre-screening  (MEN ONLY) How many times in the past year have you had 5 or more drinks in a day?: None       Alcohol Screening Audit       Drug Pre-Screening   How many times in the past year have you used a recreational drug or used a prescription medication for nonmedical reasons?: 1 or more    Drug Screening DAST  TOTAL SCORE[de-identified] 1    Mood Pre-Screening (PHQ-2)  During the past two weeks, have you been bothered by little interest or pleasure in doing things?: No  During the past two weeks, have you been bothered by feeling down, depressed, or hopeless?: No    Mood Pre-Screening (PHQ-9)         I have interviewed Dmitri Ragsdale, [id number] regarding  His alcohol consumption/drug use and risk for excessive use. Screenings were positive. Patient  Declined intervention at this time.    Deferred []    Completed on: 4/12/2022   BROOKE Chiang

## 2022-04-12 NOTE — PROGRESS NOTES
Occupational [address]  Occupational Therapy Not Seen Note    DATE: 2022    NAME: An Gallego  MRN: [id number]   : 1970      Patient not seen this date for Occupational Therapy due to:      Patient independent with ADLs and functional tasks with no acute OT needs. Pt observed fully dressed and ambulating in the hallways independently with wife. Pt reports no safety concerns for returning home at discharge and states awaiting discharge paperwork any minute to discharge home. Will defer OT evaluation at this time. Please reorder OT if future needs arise.        Electronically signed by Analia Rodriguez OT on 2022 at 2:57 PM

## 2022-04-12 NOTE — PROGRESS NOTES
Physical Therapy    Facility/Department: [facility] RENAL//MED SURG  Initial Assessment    NAME: Alexis Cortez  : 1970  MRN: [id number]    Date of Service: 2022  Chief Complaint   Patient presents with    Loss of Consciousness     found on ground unconscious     Discharge Recommendations:    No further therapy required at discharge. Assessment   Assessment: The pt ambulated 300 ft without a device x SBA. He had no c/o dizziness , pain , or fatigue with mobilization. No further PT intervention is needed at this time  Prognosis: Good  Decision Making: Medium Complexity  PT Education: Goals;PT Role;Plan of Care  REQUIRES PT FOLLOW UP: No  Activity Tolerance  Activity Tolerance: Patient Tolerated treatment well       Patient Diagnosis(es): The encounter diagnosis was Seizure ([facility] [address] has a past medical history of Seizures ([facility] [address] has no past surgical history on file. Restrictions  Restrictions/Precautions  Required Braces or Orthoses?: No  Position Activity Restriction  Other position/activity restrictions:  Up with assist, ambulate pt  Vision/Hearing  Vision: Impaired  Vision Exceptions: Wears glasses for reading  Hearing: Exceptions to [facility]  Hearing Exceptions: Hard of hearing/hearing concerns     Subjective  General  Patient assessed for rehabilitation services?: Yes  Response To Previous Treatment: Not applicable  Family / Caregiver Present: No  Follows Commands: Within Functional Limits  Subjective  Subjective: RN and pt agreeable to PT eval  Pain Screening  Patient Currently in Pain: Yes  Pain Assessment  Pain Assessment: 0-10  Pain Level: 2  Pain Location: Generalized  Vital Signs  Patient Currently in Pain: Yes     Orientation  Orientation  Overall Orientation Status: Within Normal Limits  Social/Functional History  Social/Functional History  Lives With: Spouse  Type of Home: House  Home Layout: Two level  Home Access: Stairs to enter with rails  Entrance Stairs - Number of Steps: 5  Entrance Stairs - Rails: Right  Bathroom Shower/Tub: Tub/Shower unit  Bathroom Toilet: Standard  ADL Assistance: Independent  Homemaking Assistance: Independent  Homemaking Responsibilities: Yes  Ambulation Assistance: Independent  Transfer Assistance: Independent  Active : Yes  Mode of Transportation: Truck  Occupation: Full time employment  Type of occupation: works in a [employer]  Leisure & Hobbies: TV  Cognition      Objective        AROM RLE (degrees)  RLE AROM: WNL  AROM LLE (degrees)  LLE AROM : WNL  AROM RUE (degrees)  RUE AROM : WNL  AROM LUE (degrees)  LUE AROM : WNL  Strength RLE  Strength RLE: WNL  Strength LLE  Strength LLE: WNL  Strength RUE  Strength RUE: WNL  Strength LUE  Strength LUE: WNL     Sensation  Overall Sensation Status: WFL  Bed mobility  Supine to Sit: Stand by assistance  Sit to Supine: Stand by assistance  Scooting: Stand by assistance  Transfers  Sit to Stand: Stand by assistance  Stand to sit: Stand by assistance  Ambulation  Ambulation?: Yes  Ambulation 1  Surface: level tile  Device: No Device  Assistance: Stand by assistance  Distance: amb 300 ft without a device x SBA     Balance  Posture: Good  Sitting - Static: Good  Sitting - Dynamic: Good  Standing - Static: Good  Standing - Dynamic: Good      Plan   Plan  Times per week: DC  PT  Safety Devices  Type of devices: Nurse notified,Left in bed,Call light within reach    G-Code     OutComes Score     AM-PAC Score  AM-PAC Inpatient Mobility Raw Score : 21 (04/12/22 1131)  AM-PAC Inpatient T-Scale Score : 50.25 (04/12/22 1131)  Mobility Inpatient CMS 0-100% Score: 28.97 (04/12/22 1131)  Mobility Inpatient CMS G-Code Modifier : CJ (04/12/22 1131)     Goals  Short term goals  Time Frame for Short term goals: No PT needs at this time     DC   PT    Therapy Time   Individual Concurrent Group Co-treatment   Time In 0820         Time Out 0840         Minutes [id number] Carol Restrepo, PT

## 2022-04-12 NOTE — PROGRESS NOTES
Speech Language Pathology  Facility/Department: [facility]  Initial Speech/Language/Cognitive Assessment    NAME: Henry North  : 1970   MRN: [id number]  ADMISSION DATE: 2022  ADMITTING DIAGNOSIS: has Fall at home, initial encounter; Seizure ([address] Breakthrough seizure ([address] and Noncompliance with medications on their problem list.    Date of Eval: 2022   Evaluating Therapist: Connor Banuelos    Primary Complaint:   Henry North is a 54 y.o. male who presents with found down. Patient was found down by EMS, noted to have ecchymosis on his flank, brought in by EMS as a trauma priority. Patient is groggy, appears postictal, reports that he does have history of seizures, takes Lamictal and Dilantin and is taking these medications as prescribed. Patient does not know what happened. Patient reports that he feels like he had a seizure though. Patient is denying any pain at this time. Pain:  Pain Assessment  Pain Assessment: 0-10  Pain Level: 2  Pain Location: Generalized    Assessment:  Pt presents with mild cognitive deficits characterized by difficulties with immediate recall of 5 units and delayed recall of 3 units. Pt with success when provided with min verbal cues/repetition. Pt reports no concerns with thinking and memory. No further therapy is recommended. Pt. Presents with no dysarthria, no O/M deficits at this time. Recommendations:  Requires SLP Intervention: No  D/C Recommendations: No therapy recommended at discharge.     Plan:   Goals:      Patient/family involved in developing goals and treatment plan: Yes    Subjective:  General  Chart Reviewed: Yes  Patient assessed for rehabilitation services?: Yes  Family / Caregiver Present: No  Social/Functional History  Lives With: Spouse  Active : Yes  Occupation: Full time employment  Vision  Vision: Within Functional Limits  Hearing  Hearing: Within functional limits           Objective:

## 2022-04-12 NOTE — PLAN OF CARE
Neurosurgery    Patient seen and examined  Denies any back pain on palpation CTL  No neurosurgical interventions  Neurosurgery will sign off  No follow up necessary      Electronically signed by ROSEANN Piedra NP on 4/12/2022 at 1:21 PM

## 2022-04-12 NOTE — PROGRESS NOTES
PROGRESS NOTE          PATIENT NAME: Juan Billy RECORD NO. [id number]  DATE: 2022  SURGEON: Ronan Roblero  PRIMARY CARE PHYSICIAN: No primary care provider on file. HD: # 1    ASSESSMENT    Patient Active Problem List   Diagnosis    Fall at home, initial encounter     Injuries: no acute injuries. MEDICAL DECISION MAKING AND PLAN    Neuro: History of seizures, has had 2 within the last 2 months, prior to this when 2 years without. Restarted reported home Lamictal and phenytoin. MMT including Robaxin, Tylenol. Discontinued oxycodone. Follow-up with neurology and neurosurgery recommendations. Scheduled for EEG today. Phenytoin levels noted to be subtherapeutic. T-spine fx seen on CT chronic per NS. CV: VSS  Respiratory: Room air  GI: Regular diet  : Voiding independently  Endo: No needs, BG controlled  MSK: PT/OT  Skin: Monitor for breakdown  Micro: No needs  Dispo: Plan DC home after EEG and final neurology       Chief Complaint: \" The back of my head hurts a bit\"    SUBJECTIVE    Juliana Magoaldair was seen and examined this morning, no overnight events. Patient reports he feels very good, denies pain anywhere except little tenderness of the back of his head. Has been ambulating, voiding independently. Wants to go home. VSS, afebrile. OBJECTIVE  VITALS: Temp: Temp: 97.9 °F (36.6 °C)Temp  Av.3 °F (36.8 °C)  Min: 97.9 °F (36.6 °C)  Max: 98.6 °F (37 °C) BP Systolic (04EBD), TUW:914 , Min:114 , BVX:732   Diastolic (84EKJ), CRL:51, Min:67, Max:80   Pulse Pulse  Av  Min: 79  Max: 91 Resp Resp  Av.8  Min: 15  Max: 20 Pulse ox SpO2  Av.3 %  Min: 93 %  Max: 95 %  GENERAL: alert, no distress  NEURO: GCS 15, no neuro deficits  HEENT: NCAT, PERRLA, EOMI. Tender to palpation right posterior scalp without ecchymosis or abrasions, mild focal edema.   : normal  LUNGS: Unlabored breathing on RA  HEART: normal rate and regular rhythm  ABDOMEN: soft, non-tender, non-distended and no guarding or peritoneal signs present  EXTREMITY: no cyanosis, clubbing or edema    No intake/output data recorded. Drain/tube output:  No intake/output data recorded. LAB:  CBC:   Recent Labs     04/11/22 1644 04/12/22  0537   WBC 9.5 11.8*   HGB 14.6 14.3   HCT 44.2 43.4   MCV 95.1 94.6   * 429     BMP:   Recent Labs     04/11/22 1644 04/12/22  0537    139   K 3.4* 3.8   CL 99 103   CO2 20 24   BUN 15 11   CREATININE 0.83 0.79   GLUCOSE 186* 88     COAGS:   Recent Labs     04/11/22  1644   APTT Unable to perform testing: Specimen quantity not sufficient. INR Unable to perform testing: Specimen quantity not sufficient. RADIOLOGY:  CT HEAD WO CONTRAST    Result Date: 4/11/2022  Head CT: No acute intracranial abnormality. No evidence for acute intracranial hemorrhage, territorial infarction or intracranial mass lesion. Calcification of cortex and subcortical white matter of left temporoparietal region is likely sequela to old insult. 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 Cervical CT: No acute osseous abnormality of the cervical spine. No fracture. Thoracic CT: Age indeterminate superior endplate compression fracture of T5 with 10-15% height loss. Age indeterminate inferior endplate compression fracture of T6 with 10-15% height loss. Age indeterminate superior endplate compression fracture of T7 with 10-15% height loss. For further evaluation thoracic spine MRI can be obtained. Chronic appearing compression fracture of T8-T9, T10 and T11 and T12 with 10% height loss. . Lumbar spine CT: No acute osseous abnormality of lumbar spine. No fracture. CT of the chest abdomen and pelvis: No acute traumatic injury within the chest abdomen and pelvis. For detailed description of thoracic spine please refer to the thoracic spine CT report     CT CERVICAL SPINE WO CONTRAST    Result Date: 4/11/2022  Head CT: No acute intracranial abnormality.   No evidence for acute intracranial hemorrhage, territorial infarction or intracranial mass lesion. Calcification of cortex and subcortical white matter of left temporoparietal region is likely sequela to old insult. 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 Cervical CT: No acute osseous abnormality of the cervical spine. No fracture. Thoracic CT: Age indeterminate superior endplate compression fracture of T5 with 10-15% height loss. Age indeterminate inferior endplate compression fracture of T6 with 10-15% height loss. Age indeterminate superior endplate compression fracture of T7 with 10-15% height loss. For further evaluation thoracic spine MRI can be obtained. Chronic appearing compression fracture of T8-T9, T10 and T11 and T12 with 10% height loss. . Lumbar spine CT: No acute osseous abnormality of lumbar spine. No fracture. CT of the chest abdomen and pelvis: No acute traumatic injury within the chest abdomen and pelvis. For detailed description of thoracic spine please refer to the thoracic spine CT report     CT CHEST ABDOMEN PELVIS W CONTRAST    Result Date: 4/11/2022  Head CT: No acute intracranial abnormality. No evidence for acute intracranial hemorrhage, territorial infarction or intracranial mass lesion. Calcification of cortex and subcortical white matter of left temporoparietal region is likely sequela to old insult. 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 Cervical CT: No acute osseous abnormality of the cervical spine. No fracture. Thoracic CT: Age indeterminate superior endplate compression fracture of T5 with 10-15% height loss. Age indeterminate inferior endplate compression fracture of T6 with 10-15% height loss. Age indeterminate superior endplate compression fracture of T7 with 10-15% height loss. For further evaluation thoracic spine MRI can be obtained.  Chronic appearing compression fracture of T8-T9, T10 and T11 and T12 with 10% height loss. . Lumbar spine CT: No acute osseous abnormality of lumbar spine. No fracture. CT of the chest abdomen and pelvis: No acute traumatic injury within the chest abdomen and pelvis. For detailed description of thoracic spine please refer to the thoracic spine CT report     CT LUMBAR SPINE TRAUMA RECONSTRUCTION    Result Date: 4/11/2022  Head CT: No acute intracranial abnormality. No evidence for acute intracranial hemorrhage, territorial infarction or intracranial mass lesion. Calcification of cortex and subcortical white matter of left temporoparietal region is likely sequela to old insult. 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 Cervical CT: No acute osseous abnormality of the cervical spine. No fracture. Thoracic CT: Age indeterminate superior endplate compression fracture of T5 with 10-15% height loss. Age indeterminate inferior endplate compression fracture of T6 with 10-15% height loss. Age indeterminate superior endplate compression fracture of T7 with 10-15% height loss. For further evaluation thoracic spine MRI can be obtained. Chronic appearing compression fracture of T8-T9, T10 and T11 and T12 with 10% height loss. . Lumbar spine CT: No acute osseous abnormality of lumbar spine. No fracture. CT of the chest abdomen and pelvis: No acute traumatic injury within the chest abdomen and pelvis. For detailed description of thoracic spine please refer to the thoracic spine CT report     CT THORACIC SPINE TRAUMA RECONSTRUCTION    Result Date: 4/11/2022  Head CT: No acute intracranial abnormality. No evidence for acute intracranial hemorrhage, territorial infarction or intracranial mass lesion. Calcification of cortex and subcortical white matter of left temporoparietal region is likely sequela to old insult. 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  Cervical CT: No acute osseous abnormality of the cervical spine. No fracture. Thoracic CT: Age indeterminate superior endplate compression fracture of T5 with 10-15% height loss. Age indeterminate inferior endplate compression fracture of T6 with 10-15% height loss. Age indeterminate superior endplate compression fracture of T7 with 10-15% height loss. For further evaluation thoracic spine MRI can be obtained. Chronic appearing compression fracture of T8-T9, T10 and T11 and T12 with 10% height loss. . Lumbar spine CT: No acute osseous abnormality of lumbar spine. No fracture. CT of the chest abdomen and pelvis: No acute traumatic injury within the chest abdomen and pelvis. For detailed description of thoracic spine please refer to the thoracic spine CT report         Jaime Mcclellan DO  4/12/22, 7:20 AM     Attestation signed by Dandy Bentley MD    I personally evaluated the patient and directed the medical decision making with Resident/NEENA after the physical/radiologic exam and laboratory values were reviewed and confirmed. Neurology reviewed. Compression fxs chronic. Possible DC. States feels good.      Dandy Bentley MD

## 2022-04-14 NOTE — DISCHARGE SUMMARY
DISCHARGE SUMMARY:    PATIENT NAME:  Escobar Yan  YOB: 1970  MEDICAL RECORD NO. [id number]  DATE: 04/14/22  PRIMARY CARE PHYSICIAN: Jakub Mann MD  ADMIT DATE:  4/11/2022    DISCHARGE DATE:  4/12/2022  DISPOSITION:  home  ADMITTING DIAGNOSIS:   Fall from standing    DIAGNOSIS:   Patient Active Problem List   Diagnosis    Laceration of extensor muscle, fascia and tendon of left thumb at wrist and hand level, initial encounter    Fall at home, initial encounter    Seizure [facility])    Breakthrough seizure ([address]    Noncompliance with medications       CONSULTANTS:  neurosurgery    PROCEDURES:   none    HOSPITAL COURSE:   Escobar Yan is a 54 y.o. male who was admitted on 4/11/2022  Hospital Course:  Admitted after suspected seizure and fall from standing  He was noted to have age indeterminate thoracic fractures, was seen by neurosurgery and neurology with a load in dilantin. He had no injury and was dc to home  Labs and imaging were followed daily. On day of discharge Escobar Yan  was tolerating a regular diet  had adequate analgeia on oral medications  had no signs of complication. He was deemed medically stable for discharged to Home        PHYSICAL EXAMINATION:        Discharge Vitals:  height is 6' (1.829 m) and weight is 185 lb (83.9 kg). His oral temperature is 97.9 °F (36.6 °C). His blood pressure is 125/74 and his pulse is 90. His respiration is 16 and oxygen saturation is 94%. Exam on day of discharge:  GENERAL: alert, no distress  NEURO: GCS 15, no neuro deficits  HEENT: NCAT, PERRLA, EOMI. Tender to palpation right posterior scalp without ecchymosis or abrasions, mild focal edema.   : normal  LUNGS: Unlabored breathing on RA  HEART: normal rate and regular rhythm  ABDOMEN: soft, non-tender, non-distended and no guarding or peritoneal signs present  EXTREMITY: no cyanosis, clubbing or edema    LABS:     Recent Labs     04/11/22  1644 04/12/22  0537   WBC 9.5 11.8*   HGB 14.6 14.3   HCT 44.2 43.4   * 429    139   K 3.4* 3.8   CL 99 103   CO2 20 24   BUN 15 11   CREATININE 0.83 0.79       DIAGNOSTIC TESTS:    CT HEAD WO CONTRAST    Result Date: 4/11/2022  EXAMINATION: CT OF THE HEAD WITHOUT CONTRAST; CT OF THE CERVICAL SPINE WITHOUT CONTRAST; CT OF THE LUMBAR SPINE WITHOUT CONTRAST; CT OF THE CHEST, ABDOMEN, AND PELVIS WITH CONTRAST; CT OF THE THORACIC SPINE WITHOUT CONTRAST  4/11/2022 5:02 pm TECHNIQUE: CT of the head was performed without the administration of intravenous contrast. Dose modulation, iterative reconstruction, and/or weight based adjustment of the mA/kV was utilized to reduce the radiation dose to as low as reasonably achievable.; CT of the cervical spine was performed without the administration of intravenous contrast. Multiplanar reformatted images are provided for review. Dose modulation, iterative reconstruction, and/or weight based adjustment of the mA/kV was utilized to reduce the radiation dose to as low as reasonably achievable.; CT of the lumbar spine was performed without the administration of intravenous contrast. Multiplanar reformatted images are provided for review. Adjustment of mA and/or kV according to patient size was utilized. Dose modulation, iterative reconstruction, and/or weight based adjustment of the mA/kV was utilized to reduce the radiation dose to as low as reasonably achievable.; CT of the chest, abdomen and pelvis was performed with the administration of intravenous contrast. Multiplanar reformatted images are provided for review. Dose modulation, iterative reconstruction, and/or weight based adjustment of the mA/kV was utilized to reduce the radiation dose to as low as reasonably achievable.; CT of the thoracic spine was performed without the administration of intravenous contrast. Multiplanar reformatted images are provided for review.  Dose modulation, iterative reconstruction, and/or weight based adjustment of the mA/kV was utilized to reduce the radiation dose to as low as reasonably achievable. COMPARISON: None. HISTORY: ORDERING SYSTEM PROVIDED HISTORY: trauma TECHNOLOGIST PROVIDED HISTORY: trauma FINDINGS: Head CT: There is calcification within the cortex and part of subcortical white matter within the left temporoparietal region. Finding is likely sequela to an old insult. There is no acute infarction, intracranial hemorrhage or intracranial mass lesion. No mass effect, midline shift or extra-axial collection is noted. The brain parenchyma is otherwise normal.  The cerebellar tonsils are in normal position. The ventricles, sulci, and cisterns are within normal limits in size and configuration. The globes and orbits are within normal limits. The visualized extracranial structures including paranasal sinuses and mastoid air cells are unremarkable. No fracture is identified. Cervical: BONES/ALIGNMENT: There is no acute fracture or traumatic malalignment. DEGENERATIVE CHANGES: Mild disc and facet degenerative disease at C6-C7. SOFT TISSUES: There is no prevertebral soft tissue swelling. Thoracic: BONES/ALIGNMENT: Age indeterminate superior endplate compression fracture of T5 with 10-15% height loss. Age indeterminate inferior endplate compression fracture of T6 with 10-15% height loss. Age indeterminate superior endplate compression fracture of T7 with 10-15% height loss. Chronic appearing compression fracture of T8-T9, T10 and T11 and T12 with 10% height loss. . DEGENERATIVE CHANGES: No high-grade canal or foraminal stenosis. SOFT TISSUES: There is no prevertebral soft tissue swelling. Lumbar: BONES/ALIGNMENT: There is no acute fracture or traumatic malalignment. DEGENERATIVE CHANGES: Moderate disc and facet degenerative disease at L3-L4 L4-L5 and L5-S1. At L3-L4, moderate canal stenosis and moderate bilateral neural foraminal narrowing. SOFT TISSUES: There is no prevertebral soft tissue swelling.  CT chest: Lines and tubes: None. Lungs and Airways and Pleura:  Central airways are patent. No lung consolidation. No pleural effusion. No pneumothorax. Lymph nodes: No pathologically enlarged mediastinal, hilar, lower cervical, or chest wall lymph nodes. Cardiovascular and Mediastinum: No acute aortic pathology. Cardiac chamber sizes appear to measure within normal limits on this non ECG gated study. No pericardial effusion. The thyroid gland is unremarkable. The esophagus is unremarkable. Bones/Soft tissues: For detailed description of thoracic spine please refer to the thoracic spine CT report . No definite suspicious lytic or blastic foci. CT abdomen and pelvis: Organs: The liver, spleen, adrenal glands, gallbladder, pancreas, kidneys and ureters and pelvic organs including the urinary bladder appear unremarkable. Peritoneum / Retroperitoneum:  No free air or free fluid is noted. No pathologically enlarged lymphadenopathy. The vasculature do not demonstrate acute abnormality. GI Tract:  No distention or wall thickening. Bones and Soft Tissues: No fracture. No concerning lytic or sclerotic lesions are noted. Head CT: No acute intracranial abnormality. No evidence for acute intracranial hemorrhage, territorial infarction or intracranial mass lesion. Calcification of cortex and subcortical white matter of left temporoparietal region is likely sequela to old insult. 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 Cervical CT: No acute osseous abnormality of the cervical spine. No fracture. Thoracic CT: Age indeterminate superior endplate compression fracture of T5 with 10-15% height loss. Age indeterminate inferior endplate compression fracture of T6 with 10-15% height loss. Age indeterminate superior endplate compression fracture of T7 with 10-15% height loss. For further evaluation thoracic spine MRI can be obtained.  Chronic appearing compression fracture of T8-T9, T10 and T11 and T12 with 10% height loss. . Lumbar spine CT: No acute osseous abnormality of lumbar spine. No fracture. CT of the chest abdomen and pelvis: No acute traumatic injury within the chest abdomen and pelvis. For detailed description of thoracic spine please refer to the thoracic spine CT report     CT CERVICAL SPINE WO CONTRAST    Result Date: 4/11/2022  EXAMINATION: CT OF THE HEAD WITHOUT CONTRAST; CT OF THE CERVICAL SPINE WITHOUT CONTRAST; CT OF THE LUMBAR SPINE WITHOUT CONTRAST; CT OF THE CHEST, ABDOMEN, AND PELVIS WITH CONTRAST; CT OF THE THORACIC SPINE WITHOUT CONTRAST  4/11/2022 5:02 pm TECHNIQUE: CT of the head was performed without the administration of intravenous contrast. Dose modulation, iterative reconstruction, and/or weight based adjustment of the mA/kV was utilized to reduce the radiation dose to as low as reasonably achievable.; CT of the cervical spine was performed without the administration of intravenous contrast. Multiplanar reformatted images are provided for review. Dose modulation, iterative reconstruction, and/or weight based adjustment of the mA/kV was utilized to reduce the radiation dose to as low as reasonably achievable.; CT of the lumbar spine was performed without the administration of intravenous contrast. Multiplanar reformatted images are provided for review. Adjustment of mA and/or kV according to patient size was utilized. Dose modulation, iterative reconstruction, and/or weight based adjustment of the mA/kV was utilized to reduce the radiation dose to as low as reasonably achievable.; CT of the chest, abdomen and pelvis was performed with the administration of intravenous contrast. Multiplanar reformatted images are provided for review.  Dose modulation, iterative reconstruction, and/or weight based adjustment of the mA/kV was utilized to reduce the radiation dose to as low as reasonably achievable.; CT of the thoracic spine was performed without the administration of intravenous contrast. Multiplanar reformatted images are provided for review. Dose modulation, iterative reconstruction, and/or weight based adjustment of the mA/kV was utilized to reduce the radiation dose to as low as reasonably achievable. COMPARISON: None. HISTORY: ORDERING SYSTEM PROVIDED HISTORY: trauma TECHNOLOGIST PROVIDED HISTORY: trauma FINDINGS: Head CT: There is calcification within the cortex and part of subcortical white matter within the left temporoparietal region. Finding is likely sequela to an old insult. There is no acute infarction, intracranial hemorrhage or intracranial mass lesion. No mass effect, midline shift or extra-axial collection is noted. The brain parenchyma is otherwise normal.  The cerebellar tonsils are in normal position. The ventricles, sulci, and cisterns are within normal limits in size and configuration. The globes and orbits are within normal limits. The visualized extracranial structures including paranasal sinuses and mastoid air cells are unremarkable. No fracture is identified. Cervical: BONES/ALIGNMENT: There is no acute fracture or traumatic malalignment. DEGENERATIVE CHANGES: Mild disc and facet degenerative disease at C6-C7. SOFT TISSUES: There is no prevertebral soft tissue swelling. Thoracic: BONES/ALIGNMENT: Age indeterminate superior endplate compression fracture of T5 with 10-15% height loss. Age indeterminate inferior endplate compression fracture of T6 with 10-15% height loss. Age indeterminate superior endplate compression fracture of T7 with 10-15% height loss. Chronic appearing compression fracture of T8-T9, T10 and T11 and T12 with 10% height loss. . DEGENERATIVE CHANGES: No high-grade canal or foraminal stenosis. SOFT TISSUES: There is no prevertebral soft tissue swelling. Lumbar: BONES/ALIGNMENT: There is no acute fracture or traumatic malalignment. DEGENERATIVE CHANGES: Moderate disc and facet degenerative disease at L3-L4 L4-L5 and L5-S1.   At L3-L4, moderate canal stenosis and moderate bilateral neural foraminal narrowing. SOFT TISSUES: There is no prevertebral soft tissue swelling. CT chest: Lines and tubes:  None. Lungs and Airways and Pleura:  Central airways are patent. No lung consolidation. No pleural effusion. No pneumothorax. Lymph nodes: No pathologically enlarged mediastinal, hilar, lower cervical, or chest wall lymph nodes. Cardiovascular and Mediastinum: No acute aortic pathology. Cardiac chamber sizes appear to measure within normal limits on this non ECG gated study. No pericardial effusion. The thyroid gland is unremarkable. The esophagus is unremarkable. Bones/Soft tissues: For detailed description of thoracic spine please refer to the thoracic spine CT report . No definite suspicious lytic or blastic foci. CT abdomen and pelvis: Organs: The liver, spleen, adrenal glands, gallbladder, pancreas, kidneys and ureters and pelvic organs including the urinary bladder appear unremarkable. Peritoneum / Retroperitoneum:  No free air or free fluid is noted. No pathologically enlarged lymphadenopathy. The vasculature do not demonstrate acute abnormality. GI Tract:  No distention or wall thickening. Bones and Soft Tissues: No fracture. No concerning lytic or sclerotic lesions are noted. Head CT: No acute intracranial abnormality. No evidence for acute intracranial hemorrhage, territorial infarction or intracranial mass lesion. Calcification of cortex and subcortical white matter of left temporoparietal region is likely sequela to old insult. 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 Cervical CT: No acute osseous abnormality of the cervical spine. No fracture. Thoracic CT: Age indeterminate superior endplate compression fracture of T5 with 10-15% height loss. Age indeterminate inferior endplate compression fracture of T6 with 10-15% height loss.  Age indeterminate superior endplate compression fracture of T7 with 10-15% height loss. For further evaluation thoracic spine MRI can be obtained. Chronic appearing compression fracture of T8-T9, T10 and T11 and T12 with 10% height loss. . Lumbar spine CT: No acute osseous abnormality of lumbar spine. No fracture. CT of the chest abdomen and pelvis: No acute traumatic injury within the chest abdomen and pelvis. For detailed description of thoracic spine please refer to the thoracic spine CT report     CT CHEST ABDOMEN PELVIS W CONTRAST    Result Date: 4/11/2022  EXAMINATION: CT OF THE HEAD WITHOUT CONTRAST; CT OF THE CERVICAL SPINE WITHOUT CONTRAST; CT OF THE LUMBAR SPINE WITHOUT CONTRAST; CT OF THE CHEST, ABDOMEN, AND PELVIS WITH CONTRAST; CT OF THE THORACIC SPINE WITHOUT CONTRAST  4/11/2022 5:02 pm TECHNIQUE: CT of the head was performed without the administration of intravenous contrast. Dose modulation, iterative reconstruction, and/or weight based adjustment of the mA/kV was utilized to reduce the radiation dose to as low as reasonably achievable.; CT of the cervical spine was performed without the administration of intravenous contrast. Multiplanar reformatted images are provided for review. Dose modulation, iterative reconstruction, and/or weight based adjustment of the mA/kV was utilized to reduce the radiation dose to as low as reasonably achievable.; CT of the lumbar spine was performed without the administration of intravenous contrast. Multiplanar reformatted images are provided for review. Adjustment of mA and/or kV according to patient size was utilized. Dose modulation, iterative reconstruction, and/or weight based adjustment of the mA/kV was utilized to reduce the radiation dose to as low as reasonably achievable.; CT of the chest, abdomen and pelvis was performed with the administration of intravenous contrast. Multiplanar reformatted images are provided for review.  Dose modulation, iterative reconstruction, and/or weight based adjustment of the mA/kV was utilized to reduce the radiation dose to as low as reasonably achievable.; CT of the thoracic spine was performed without the administration of intravenous contrast. Multiplanar reformatted images are provided for review. Dose modulation, iterative reconstruction, and/or weight based adjustment of the mA/kV was utilized to reduce the radiation dose to as low as reasonably achievable. COMPARISON: None. HISTORY: ORDERING SYSTEM PROVIDED HISTORY: trauma TECHNOLOGIST PROVIDED HISTORY: trauma FINDINGS: Head CT: There is calcification within the cortex and part of subcortical white matter within the left temporoparietal region. Finding is likely sequela to an old insult. There is no acute infarction, intracranial hemorrhage or intracranial mass lesion. No mass effect, midline shift or extra-axial collection is noted. The brain parenchyma is otherwise normal.  The cerebellar tonsils are in normal position. The ventricles, sulci, and cisterns are within normal limits in size and configuration. The globes and orbits are within normal limits. The visualized extracranial structures including paranasal sinuses and mastoid air cells are unremarkable. No fracture is identified. Cervical: BONES/ALIGNMENT: There is no acute fracture or traumatic malalignment. DEGENERATIVE CHANGES: Mild disc and facet degenerative disease at C6-C7. SOFT TISSUES: There is no prevertebral soft tissue swelling. Thoracic: BONES/ALIGNMENT: Age indeterminate superior endplate compression fracture of T5 with 10-15% height loss. Age indeterminate inferior endplate compression fracture of T6 with 10-15% height loss. Age indeterminate superior endplate compression fracture of T7 with 10-15% height loss. Chronic appearing compression fracture of T8-T9, T10 and T11 and T12 with 10% height loss. . DEGENERATIVE CHANGES: No high-grade canal or foraminal stenosis. SOFT TISSUES: There is no prevertebral soft tissue swelling.  Lumbar: BONES/ALIGNMENT: There is no acute fracture or traumatic malalignment. DEGENERATIVE CHANGES: Moderate disc and facet degenerative disease at L3-L4 L4-L5 and L5-S1. At L3-L4, moderate canal stenosis and moderate bilateral neural foraminal narrowing. SOFT TISSUES: There is no prevertebral soft tissue swelling. CT chest: Lines and tubes:  None. Lungs and Airways and Pleura:  Central airways are patent. No lung consolidation. No pleural effusion. No pneumothorax. Lymph nodes: No pathologically enlarged mediastinal, hilar, lower cervical, or chest wall lymph nodes. Cardiovascular and Mediastinum: No acute aortic pathology. Cardiac chamber sizes appear to measure within normal limits on this non ECG gated study. No pericardial effusion. The thyroid gland is unremarkable. The esophagus is unremarkable. Bones/Soft tissues: For detailed description of thoracic spine please refer to the thoracic spine CT report . No definite suspicious lytic or blastic foci. CT abdomen and pelvis: Organs: The liver, spleen, adrenal glands, gallbladder, pancreas, kidneys and ureters and pelvic organs including the urinary bladder appear unremarkable. Peritoneum / Retroperitoneum:  No free air or free fluid is noted. No pathologically enlarged lymphadenopathy. The vasculature do not demonstrate acute abnormality. GI Tract:  No distention or wall thickening. Bones and Soft Tissues: No fracture. No concerning lytic or sclerotic lesions are noted. Head CT: No acute intracranial abnormality. No evidence for acute intracranial hemorrhage, territorial infarction or intracranial mass lesion. Calcification of cortex and subcortical white matter of left temporoparietal region is likely sequela to old insult. 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 Cervical CT: No acute osseous abnormality of the cervical spine. No fracture.  Thoracic CT: Age indeterminate superior endplate compression fracture of T5 with 10-15% height loss. Age indeterminate inferior endplate compression fracture of T6 with 10-15% height loss. Age indeterminate superior endplate compression fracture of T7 with 10-15% height loss. For further evaluation thoracic spine MRI can be obtained. Chronic appearing compression fracture of T8-T9, T10 and T11 and T12 with 10% height loss. . Lumbar spine CT: No acute osseous abnormality of lumbar spine. No fracture. CT of the chest abdomen and pelvis: No acute traumatic injury within the chest abdomen and pelvis. For detailed description of thoracic spine please refer to the thoracic spine CT report     CT LUMBAR SPINE TRAUMA RECONSTRUCTION    Result Date: 4/11/2022  EXAMINATION: CT OF THE HEAD WITHOUT CONTRAST; CT OF THE CERVICAL SPINE WITHOUT CONTRAST; CT OF THE LUMBAR SPINE WITHOUT CONTRAST; CT OF THE CHEST, ABDOMEN, AND PELVIS WITH CONTRAST; CT OF THE THORACIC SPINE WITHOUT CONTRAST  4/11/2022 5:02 pm TECHNIQUE: CT of the head was performed without the administration of intravenous contrast. Dose modulation, iterative reconstruction, and/or weight based adjustment of the mA/kV was utilized to reduce the radiation dose to as low as reasonably achievable.; CT of the cervical spine was performed without the administration of intravenous contrast. Multiplanar reformatted images are provided for review. Dose modulation, iterative reconstruction, and/or weight based adjustment of the mA/kV was utilized to reduce the radiation dose to as low as reasonably achievable.; CT of the lumbar spine was performed without the administration of intravenous contrast. Multiplanar reformatted images are provided for review. Adjustment of mA and/or kV according to patient size was utilized.   Dose modulation, iterative reconstruction, and/or weight based adjustment of the mA/kV was utilized to reduce the radiation dose to as low as reasonably achievable.; CT of the chest, abdomen and pelvis was performed with the administration of intravenous contrast. Multiplanar reformatted images are provided for review. Dose modulation, iterative reconstruction, and/or weight based adjustment of the mA/kV was utilized to reduce the radiation dose to as low as reasonably achievable.; CT of the thoracic spine was performed without the administration of intravenous contrast. Multiplanar reformatted images are provided for review. Dose modulation, iterative reconstruction, and/or weight based adjustment of the mA/kV was utilized to reduce the radiation dose to as low as reasonably achievable. COMPARISON: None. HISTORY: ORDERING SYSTEM PROVIDED HISTORY: trauma TECHNOLOGIST PROVIDED HISTORY: trauma FINDINGS: Head CT: There is calcification within the cortex and part of subcortical white matter within the left temporoparietal region. Finding is likely sequela to an old insult. There is no acute infarction, intracranial hemorrhage or intracranial mass lesion. No mass effect, midline shift or extra-axial collection is noted. The brain parenchyma is otherwise normal.  The cerebellar tonsils are in normal position. The ventricles, sulci, and cisterns are within normal limits in size and configuration. The globes and orbits are within normal limits. The visualized extracranial structures including paranasal sinuses and mastoid air cells are unremarkable. No fracture is identified. Cervical: BONES/ALIGNMENT: There is no acute fracture or traumatic malalignment. DEGENERATIVE CHANGES: Mild disc and facet degenerative disease at C6-C7. SOFT TISSUES: There is no prevertebral soft tissue swelling. Thoracic: BONES/ALIGNMENT: Age indeterminate superior endplate compression fracture of T5 with 10-15% height loss. Age indeterminate inferior endplate compression fracture of T6 with 10-15% height loss. Age indeterminate superior endplate compression fracture of T7 with 10-15% height loss.  Chronic appearing compression fracture of T8-T9, T10 and T11 and T12 with 10% height loss. Radha Mario DEGENERATIVE CHANGES: No high-grade canal or foraminal stenosis. SOFT TISSUES: There is no prevertebral soft tissue swelling. Lumbar: BONES/ALIGNMENT: There is no acute fracture or traumatic malalignment. DEGENERATIVE CHANGES: Moderate disc and facet degenerative disease at L3-L4 L4-L5 and L5-S1. At L3-L4, moderate canal stenosis and moderate bilateral neural foraminal narrowing. SOFT TISSUES: There is no prevertebral soft tissue swelling. CT chest: Lines and tubes:  None. Lungs and Airways and Pleura:  Central airways are patent. No lung consolidation. No pleural effusion. No pneumothorax. Lymph nodes: No pathologically enlarged mediastinal, hilar, lower cervical, or chest wall lymph nodes. Cardiovascular and Mediastinum: No acute aortic pathology. Cardiac chamber sizes appear to measure within normal limits on this non ECG gated study. No pericardial effusion. The thyroid gland is unremarkable. The esophagus is unremarkable. Bones/Soft tissues: For detailed description of thoracic spine please refer to the thoracic spine CT report . No definite suspicious lytic or blastic foci. CT abdomen and pelvis: Organs: The liver, spleen, adrenal glands, gallbladder, pancreas, kidneys and ureters and pelvic organs including the urinary bladder appear unremarkable. Peritoneum / Retroperitoneum:  No free air or free fluid is noted. No pathologically enlarged lymphadenopathy. The vasculature do not demonstrate acute abnormality. GI Tract:  No distention or wall thickening. Bones and Soft Tissues: No fracture. No concerning lytic or sclerotic lesions are noted. Head CT: No acute intracranial abnormality. No evidence for acute intracranial hemorrhage, territorial infarction or intracranial mass lesion. Calcification of cortex and subcortical white matter of left temporoparietal region is likely sequela to old insult. 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 Cervical CT: No acute osseous abnormality of the cervical spine. No fracture. Thoracic CT: Age indeterminate superior endplate compression fracture of T5 with 10-15% height loss. Age indeterminate inferior endplate compression fracture of T6 with 10-15% height loss. Age indeterminate superior endplate compression fracture of T7 with 10-15% height loss. For further evaluation thoracic spine MRI can be obtained. Chronic appearing compression fracture of T8-T9, T10 and T11 and T12 with 10% height loss. . Lumbar spine CT: No acute osseous abnormality of lumbar spine. No fracture. CT of the chest abdomen and pelvis: No acute traumatic injury within the chest abdomen and pelvis. For detailed description of thoracic spine please refer to the thoracic spine CT report     CT THORACIC SPINE TRAUMA RECONSTRUCTION    Result Date: 4/11/2022  EXAMINATION: CT OF THE HEAD WITHOUT CONTRAST; CT OF THE CERVICAL SPINE WITHOUT CONTRAST; CT OF THE LUMBAR SPINE WITHOUT CONTRAST; CT OF THE CHEST, ABDOMEN, AND PELVIS WITH CONTRAST; CT OF THE THORACIC SPINE WITHOUT CONTRAST  4/11/2022 5:02 pm TECHNIQUE: CT of the head was performed without the administration of intravenous contrast. Dose modulation, iterative reconstruction, and/or weight based adjustment of the mA/kV was utilized to reduce the radiation dose to as low as reasonably achievable.; CT of the cervical spine was performed without the administration of intravenous contrast. Multiplanar reformatted images are provided for review. Dose modulation, iterative reconstruction, and/or weight based adjustment of the mA/kV was utilized to reduce the radiation dose to as low as reasonably achievable.; CT of the lumbar spine was performed without the administration of intravenous contrast. Multiplanar reformatted images are provided for review. Adjustment of mA and/or kV according to patient size was utilized.   Dose modulation, iterative reconstruction, and/or weight based adjustment of the mA/kV was utilized to reduce the radiation dose to as low as reasonably achievable.; CT of the chest, abdomen and pelvis was performed with the administration of intravenous contrast. Multiplanar reformatted images are provided for review. Dose modulation, iterative reconstruction, and/or weight based adjustment of the mA/kV was utilized to reduce the radiation dose to as low as reasonably achievable.; CT of the thoracic spine was performed without the administration of intravenous contrast. Multiplanar reformatted images are provided for review. Dose modulation, iterative reconstruction, and/or weight based adjustment of the mA/kV was utilized to reduce the radiation dose to as low as reasonably achievable. COMPARISON: None. HISTORY: ORDERING SYSTEM PROVIDED HISTORY: trauma TECHNOLOGIST PROVIDED HISTORY: trauma FINDINGS: Head CT: There is calcification within the cortex and part of subcortical white matter within the left temporoparietal region. Finding is likely sequela to an old insult. There is no acute infarction, intracranial hemorrhage or intracranial mass lesion. No mass effect, midline shift or extra-axial collection is noted. The brain parenchyma is otherwise normal.  The cerebellar tonsils are in normal position. The ventricles, sulci, and cisterns are within normal limits in size and configuration. The globes and orbits are within normal limits. The visualized extracranial structures including paranasal sinuses and mastoid air cells are unremarkable. No fracture is identified. Cervical: BONES/ALIGNMENT: There is no acute fracture or traumatic malalignment. DEGENERATIVE CHANGES: Mild disc and facet degenerative disease at C6-C7. SOFT TISSUES: There is no prevertebral soft tissue swelling. Thoracic: BONES/ALIGNMENT: Age indeterminate superior endplate compression fracture of T5 with 10-15% height loss. Age indeterminate inferior endplate compression fracture of T6 with 10-15% height loss.   Age indeterminate superior endplate compression fracture of T7 with 10-15% height loss. Chronic appearing compression fracture of T8-T9, T10 and T11 and T12 with 10% height loss. . DEGENERATIVE CHANGES: No high-grade canal or foraminal stenosis. SOFT TISSUES: There is no prevertebral soft tissue swelling. Lumbar: BONES/ALIGNMENT: There is no acute fracture or traumatic malalignment. DEGENERATIVE CHANGES: Moderate disc and facet degenerative disease at L3-L4 L4-L5 and L5-S1. At L3-L4, moderate canal stenosis and moderate bilateral neural foraminal narrowing. SOFT TISSUES: There is no prevertebral soft tissue swelling. CT chest: Lines and tubes:  None. Lungs and Airways and Pleura:  Central airways are patent. No lung consolidation. No pleural effusion. No pneumothorax. Lymph nodes: No pathologically enlarged mediastinal, hilar, lower cervical, or chest wall lymph nodes. Cardiovascular and Mediastinum: No acute aortic pathology. Cardiac chamber sizes appear to measure within normal limits on this non ECG gated study. No pericardial effusion. The thyroid gland is unremarkable. The esophagus is unremarkable. Bones/Soft tissues: For detailed description of thoracic spine please refer to the thoracic spine CT report . No definite suspicious lytic or blastic foci. CT abdomen and pelvis: Organs: The liver, spleen, adrenal glands, gallbladder, pancreas, kidneys and ureters and pelvic organs including the urinary bladder appear unremarkable. Peritoneum / Retroperitoneum:  No free air or free fluid is noted. No pathologically enlarged lymphadenopathy. The vasculature do not demonstrate acute abnormality. GI Tract:  No distention or wall thickening. Bones and Soft Tissues: No fracture. No concerning lytic or sclerotic lesions are noted. Head CT: No acute intracranial abnormality. No evidence for acute intracranial hemorrhage, territorial infarction or intracranial mass lesion.  Calcification of cortex and subcortical white matter of left temporoparietal region is likely sequela to old insult. 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 Cervical CT: No acute osseous abnormality of the cervical spine. No fracture. Thoracic CT: Age indeterminate superior endplate compression fracture of T5 with 10-15% height loss. Age indeterminate inferior endplate compression fracture of T6 with 10-15% height loss. Age indeterminate superior endplate compression fracture of T7 with 10-15% height loss. For further evaluation thoracic spine MRI can be obtained. Chronic appearing compression fracture of T8-T9, T10 and T11 and T12 with 10% height loss. . Lumbar spine CT: No acute osseous abnormality of lumbar spine. No fracture. CT of the chest abdomen and pelvis: No acute traumatic injury within the chest abdomen and pelvis. For detailed description of thoracic spine please refer to the thoracic spine CT report       DISCHARGE INSTRUCTIONS     Discharge Medications:        Medication List      CONTINUE taking these medications    Dilantin 100 MG ER capsule  Generic drug: phenytoin     lamoTRIgine 100 MG tablet  Commonly known as: LAMICTAL          Diet: No diet orders on file diet as tolerated  Activity: As instructed WEIGHT BEARING STATUS: Weight bearing as tolerated  Wound Care: Daily and as needed. DISPOSITION: Home    Follow-up:  No follow-up provider specified.       SIGNED:  ROSEANN Hendrix CNP   4/14/2022, 2:42 PM  Time Spent for discharge: 35 minutes

## 2024-03-04 ENCOUNTER — HOSPITAL ENCOUNTER (EMERGENCY)
Age: 54
Discharge: HOME | End: 2024-03-05
Attending: EMERGENCY MEDICINE
Payer: COMMERCIAL

## 2024-03-04 DIAGNOSIS — J45.901 EXACERBATION OF ASTHMA, UNSPECIFIED ASTHMA SEVERITY, UNSPECIFIED WHETHER PERSISTENT: Primary | ICD-10-CM

## 2024-03-04 PROCEDURE — 99284 EMERGENCY DEPT VISIT MOD MDM: CPT

## 2024-03-04 PROCEDURE — 93005 ELECTROCARDIOGRAM TRACING: CPT | Performed by: EMERGENCY MEDICINE

## 2024-03-05 ENCOUNTER — APPOINTMENT (OUTPATIENT)
Dept: GENERAL RADIOLOGY | Age: 54
End: 2024-03-05
Payer: COMMERCIAL

## 2024-03-05 VITALS
DIASTOLIC BLOOD PRESSURE: 90 MMHG | SYSTOLIC BLOOD PRESSURE: 132 MMHG | RESPIRATION RATE: 18 BRPM | HEART RATE: 88 BPM | HEIGHT: 72 IN | BODY MASS INDEX: 25.06 KG/M2 | TEMPERATURE: 97.7 F | OXYGEN SATURATION: 94 % | WEIGHT: 185 LBS

## 2024-03-05 LAB
EKG ATRIAL RATE: 87 BPM
EKG P AXIS: 81 DEGREES
EKG P-R INTERVAL: 196 MS
EKG Q-T INTERVAL: 354 MS
EKG QRS DURATION: 108 MS
EKG QTC CALCULATION (BAZETT): 425 MS
EKG R AXIS: 40 DEGREES
EKG T AXIS: 73 DEGREES
EKG VENTRICULAR RATE: 87 BPM

## 2024-03-05 PROCEDURE — 6370000000 HC RX 637 (ALT 250 FOR IP): Performed by: EMERGENCY MEDICINE

## 2024-03-05 PROCEDURE — 94761 N-INVAS EAR/PLS OXIMETRY MLT: CPT

## 2024-03-05 PROCEDURE — 71045 X-RAY EXAM CHEST 1 VIEW: CPT

## 2024-03-05 PROCEDURE — 94640 AIRWAY INHALATION TREATMENT: CPT

## 2024-03-05 RX ORDER — PREDNISONE 50 MG/1
50 TABLET ORAL DAILY
Qty: 4 TABLET | Refills: 0 | Status: SHIPPED | OUTPATIENT
Start: 2024-03-05 | End: 2024-03-09

## 2024-03-05 RX ORDER — ALBUTEROL SULFATE 90 UG/1
2 AEROSOL, METERED RESPIRATORY (INHALATION) 4 TIMES DAILY PRN
Qty: 36 G | Refills: 0 | Status: SHIPPED | OUTPATIENT
Start: 2024-03-05

## 2024-03-05 RX ORDER — ALBUTEROL SULFATE 2.5 MG/3ML
2.5 SOLUTION RESPIRATORY (INHALATION) EVERY 4 HOURS PRN
Qty: 120 EACH | Refills: 0 | Status: SHIPPED | OUTPATIENT
Start: 2024-03-05

## 2024-03-05 RX ORDER — PREDNISONE 20 MG/1
50 TABLET ORAL ONCE
Status: COMPLETED | OUTPATIENT
Start: 2024-03-05 | End: 2024-03-05

## 2024-03-05 RX ORDER — IPRATROPIUM BROMIDE AND ALBUTEROL SULFATE 2.5; .5 MG/3ML; MG/3ML
1 SOLUTION RESPIRATORY (INHALATION) ONCE
Status: COMPLETED | OUTPATIENT
Start: 2024-03-05 | End: 2024-03-05

## 2024-03-05 RX ADMIN — IPRATROPIUM BROMIDE AND ALBUTEROL SULFATE 1 DOSE: 2.5; .5 SOLUTION RESPIRATORY (INHALATION) at 00:58

## 2024-03-05 RX ADMIN — PREDNISONE 50 MG: 20 TABLET ORAL at 00:33

## 2024-03-05 NOTE — DISCHARGE INSTRUCTIONS
Take 50 mg of prednisone daily for another 4 days starting tomorrow.  Use your albuterol inhaler or nebulizer machine every 4 hours as needed for shortness of breath and wheezing.  If you develop significant worsening of your breathing or any other concerning symptoms come back to the ER.  Follow-up with your primary care doctor.

## 2024-03-05 NOTE — ED NOTES
Discharge instructions reviewed with patient and spouse. Aware new prescriptions sent to documented preferred pharmacy. Encouraged /fu with PCP. Declined wheelchair.

## 2024-03-05 NOTE — ED PROVIDER NOTES
EMERGENCY DEPARTMENT ENCOUNTER    Pt Name: Puenet Chris  MRN: [id number]  Birthdate 1970  Date of evaluation: 3/5/24  CHIEF COMPLAINT       Chief Complaint   Patient presents with    Shortness of Breath     HISTORY OF PRESENT ILLNESS   HPI  53-year-old male with a history of seizure disorder, asthma presents to the ED for about 2 weeks of progressive shortness of breath and wheezing.  Patient states that he has a mild dry cough, states that he is out of albuterol inhalers or nebulizer medication.  He denies fever/chills, chest pain, nausea/vomiting or any other acute complaints.    REVIEW OF SYSTEMS     Review of Systems   All other systems reviewed and are negative.    PASTMEDICAL HISTORY     Past Medical History:   Diagnosis Date    Seizure disorder, grand mal (HCC)     Seizures (HCC)      SURGICAL HISTORY       Past Surgical History:   Procedure Laterality Date    HAND TENDON SURGERY Left 11/13/2019    THUMB FLEXOR POLLICIS LONGUS REPAIR, FPL, EPL performed by Suresh Antunez MD at [facility] OR    HAND TENDON SURGERY Left 12/20/2019    HAND LIGAMENT TENDON REPAIR - EXPLORATION FLEXOR TENDON LACERATION THUMB performed by Suresh Antunez MD at [facility] OR     CURRENT MEDICATIONS       Discharge Medication List as of 3/5/2024  1:41 AM        CONTINUE these medications which have NOT CHANGED    Details   !! lamoTRIgine (LAMICTAL) 100 MG tablet Take by mouth daily Unsure of dosesHistorical Med      !! phenytoin (DILANTIN) 100 MG ER capsule Take by mouth 2 times daily Unsure of dosesHistorical Med      !! phenytoin (DILANTIN) 100 MG ER capsule Take 100 mg by mouth 2 times daily Indications: History of Seizures 200mg at 2pm, 400mg at 2amHistorical Med      !! lamoTRIgine (LAMICTAL) 100 MG tablet Take 200 mg by mouth daily Indications: History of Seizures 700mg at 2pm, 800mg at 2amHistorical Med       !! - Potential duplicate medications found. Please discuss with provider.

## 2025-03-10 ENCOUNTER — HOSPITAL ENCOUNTER (OUTPATIENT)
Dept: SLEEP CENTER | Age: 55
Discharge: HOME | End: 2025-03-12
Payer: COMMERCIAL

## 2025-03-10 PROCEDURE — 95810 POLYSOM 6/> YRS 4/> PARAM: CPT

## 2025-05-12 ENCOUNTER — HOSPITAL ENCOUNTER (OUTPATIENT)
Dept: SLEEP CENTER | Age: 55
Discharge: HOME | End: 2025-05-14
Payer: COMMERCIAL

## 2025-05-12 VITALS — HEIGHT: 72 IN | BODY MASS INDEX: 25.06 KG/M2 | WEIGHT: 185 LBS

## 2025-05-12 PROCEDURE — 95811 POLYSOM 6/>YRS CPAP 4/> PARM: CPT

## (undated) DEVICE — SUTURE PROL SZ 3-0 L18IN NONABSORBABLE BLU L30MM FS-1 3/8 8663G

## (undated) DEVICE — MHPB HAND AND FOOT PACK: Brand: MEDLINE INDUSTRIES, INC.

## (undated) DEVICE — DRESSING PETRO W3XL3IN OIL EMUL N ADH GZ KNIT IMPREG CELOS

## (undated) DEVICE — BANDAGE,ELASTIC,ESMARK,STERILE,4"X9',LF: Brand: MEDLINE

## (undated) DEVICE — BANDAGE GZ W2XL75IN ST RAYON POLY CNFRM STRTCH LTWT

## (undated) DEVICE — BNDG,ELSTC,MATRIX,STRL,4"X5YD,LF,HOOK&LP: Brand: MEDLINE

## (undated) DEVICE — GLOVE ORANGE PI 8   MSG9080

## (undated) DEVICE — SUTURE PROL SZ 4-0 L18IN NONABSORBABLE BLU L16MM PC-3 3/8 8634G

## (undated) DEVICE — SPLINT ORTH W3XL12IN LAYERED FBRGLS FOAM PD BRTH BK MOLD

## (undated) DEVICE — PADDING,UNDERCAST,COTTON, 3X4YD STERILE: Brand: MEDLINE

## (undated) DEVICE — SUTURE SURGLON 3-0 L18IN NONABSORBABLE WHT P-12 L19MM 3/8 SBS1883G

## (undated) DEVICE — PENCIL ES L3M BTTN SWCH HOLSTER W/ BLDE ELECTRD EDGE

## (undated) DEVICE — ELECTRODE ELECSURG NDL 2.8 INX7.2 CM COAT INSUL EDGE

## (undated) DEVICE — Device

## (undated) DEVICE — Z DISCONTINUED BY MEDLINE USE 2711682 TRAY SKIN PREP DRY W/ PREM GLV